# Patient Record
Sex: MALE | Race: BLACK OR AFRICAN AMERICAN | NOT HISPANIC OR LATINO | Employment: OTHER | URBAN - METROPOLITAN AREA
[De-identification: names, ages, dates, MRNs, and addresses within clinical notes are randomized per-mention and may not be internally consistent; named-entity substitution may affect disease eponyms.]

---

## 2017-02-07 ENCOUNTER — OFFICE VISIT (OUTPATIENT)
Dept: CARDIOLOGY | Facility: CLINIC | Age: 82
End: 2017-02-07

## 2017-02-07 VITALS
SYSTOLIC BLOOD PRESSURE: 164 MMHG | BODY MASS INDEX: 24.1 KG/M2 | DIASTOLIC BLOOD PRESSURE: 90 MMHG | HEIGHT: 68 IN | WEIGHT: 159 LBS | HEART RATE: 65 BPM

## 2017-02-07 DIAGNOSIS — I25.10 CORONARY ARTERY DISEASE INVOLVING NATIVE CORONARY ARTERY OF NATIVE HEART WITHOUT ANGINA PECTORIS: Primary | ICD-10-CM

## 2017-02-07 DIAGNOSIS — R09.89 RIGHT CAROTID BRUIT: ICD-10-CM

## 2017-02-07 DIAGNOSIS — E11.59 TYPE 2 DIABETES MELLITUS WITH OTHER CIRCULATORY COMPLICATION: ICD-10-CM

## 2017-02-07 DIAGNOSIS — E78.5 DYSLIPIDEMIA: ICD-10-CM

## 2017-02-07 DIAGNOSIS — I10 ESSENTIAL HYPERTENSION: ICD-10-CM

## 2017-02-07 PROCEDURE — 99214 OFFICE O/P EST MOD 30 MIN: CPT | Performed by: NURSE PRACTITIONER

## 2017-02-07 PROCEDURE — 93000 ELECTROCARDIOGRAM COMPLETE: CPT | Performed by: NURSE PRACTITIONER

## 2017-02-07 RX ORDER — METOPROLOL SUCCINATE 50 MG/1
50 TABLET, EXTENDED RELEASE ORAL DAILY
COMMUNITY
End: 2017-04-27 | Stop reason: SDUPTHER

## 2017-02-07 NOTE — PROGRESS NOTES
Date of Office Visit: 2017  Encounter Provider: THOMAS Hernandez  Place of Service: [facility] CARDIOLOGY  Patient Name: Gera Lira  :1935    Chief Complaint   Patient presents with   • Coronary Artery Disease     yearly follow up   :     HPI: Gera Lira is a 81 y.o. male comes in today for six month followup.  He is a patient of Dr. Vidales, and I am seeing him for the first time today.   He has a history of coronary artery disease, hypertension, diabetes, dyslipidemia (intolerant to statins) and status post coronary artery bypass graft.   He originally presented with an acute inferior myocardial infarction.  He underwent a cardiac catheterization and was found to have severe 3-vessel disease, including critical left main coronary disease.   He had a coronary artery bypass graft with Dr. Hill on 2014.      Today, he presents and has been feeling well.  He denies palpitations or tachycardia, shortness of breath, edema, lightheadedness, chest pain, fatigue, orthopnea or PND.  He is hypertensive today in clinic and he has not taken his medications.   He follows with a renal physician for mild renal insufficiency.  He is not on an ace inhibitor or an ARB, but is on amlodipine and this has recently been adjusted.  He follows up with Dr. Burrows on a routine basis.  He had lab work drawn in 10/2016 showing urine microalbumin of 24.9, total cholesterol of 240,  triglycerides 102, HDL 72, .   He does take Zetia.  His hemoglobin A1C was 6.81 on Trajenta.   His  CMP showed BUN of 13, creatinine 1.48, sodium 143, potassium 4.3,. chloride 102, CO2 27.0, ALT 30, AST 20.          Past Medical History   Diagnosis Date   • Acute inferior myocardial infarction    • Angina, class III    • Cardiomyopathy    • Coronary artery disease    • Diabetes mellitus    • Dyslipidemia    • Essential hypertension    • Health care maintenance    • History of penile cancer    •  "Hyperinsulinism    • Hyperlipidemia    • MI (myocardial infarction)    • Prostate cancer    • S/P CABG (coronary artery bypass graft)        Past Surgical History   Procedure Laterality Date   • Back surgery     • Coronary artery bypass graft     • Other surgical history       CARDIAC CATH PROCEDURE SUMMARY   • Other surgical history       RADIATION THERAPY   • Cardiac catheterization             Review of Systems   Constitution: Negative for fever and malaise/fatigue.   HENT: Positive for hearing loss. Negative for ear pain, nosebleeds and sore throat.    Eyes: Negative for double vision, pain, vision loss in left eye, vision loss in right eye and visual disturbance.   Cardiovascular: Negative for claudication and leg swelling.   Respiratory: Negative for cough, snoring and wheezing.    Endocrine: Negative for cold intolerance, heat intolerance and polyuria.   Skin: Negative for color change, itching and rash.   Musculoskeletal: Negative for joint pain, joint swelling and muscle cramps.   Gastrointestinal: Negative for abdominal pain, diarrhea, melena, nausea and vomiting.   Genitourinary: Negative for bladder incontinence and hematuria.   Neurological: Negative for excessive daytime sleepiness, dizziness, light-headedness, paresthesias and seizures.   Psychiatric/Behavioral: Negative for depression. The patient is not nervous/anxious.    All other systems reviewed and are negative.    All other systems reviewed and are negative    No Known Allergies    All aspects of family and social history reviewed.          Objective:     Vitals:    02/07/17 1038   BP: 164/90   BP Location: Left arm   Pulse: 65   Weight: 159 lb (72.1 kg)   Height: 68\" (172.7 cm)     Body mass index is 24.18 kg/(m^2).    PHYSICAL EXAM:  Physical Exam   Constitutional: He is oriented to person, place, and time. He appears well-developed and well-nourished.   HENT:   Head: Normocephalic and atraumatic.   Neck: Neck supple. No JVD present. "   Cardiovascular: Normal rate, regular rhythm, normal heart sounds and intact distal pulses.    Pulses:       Carotid pulses are 2+ on the right side with bruit, and 2+ on the left side.       Radial pulses are 2+ on the right side, and 2+ on the left side.        Dorsalis pedis pulses are 2+ on the right side, and 2+ on the left side.   Pulmonary/Chest: Effort normal and breath sounds normal. No accessory muscle usage. No respiratory distress. He has no rales.   Abdominal: Soft. Normal appearance and bowel sounds are normal. There is no tenderness.   Musculoskeletal: Normal range of motion. He exhibits no edema.   Neurological: He is alert and oriented to person, place, and time.   Skin: Skin is warm, dry and intact. He is not diaphoretic.   Psychiatric: He has a normal mood and affect. His speech is normal and behavior is normal. Judgment and thought content normal. Cognition and memory are normal.         ECG 12 Lead  Date/Time: 2/7/2017 11:06 AM  Performed by: DIANA BLANTON  Authorized by: DIANA BLANTON   Comparison: compared with previous ECG from 2/3/2016  Similar to previous ECG  Rhythm: sinus rhythm  Rate: normal  BPM: 65  T flattening: I, II and aVF  QRS axis: normal  Other findings: LVH  Clinical impression: abnormal ECG  Comments: Indication: CAD                Assessment:       Diagnosis Plan   1. Coronary artery disease involving native coronary artery of native heart without angina pectoris  Duplex Carotid Ultrasound CAR   2. Right carotid bruit  Duplex Carotid Ultrasound CAR   3. Essential hypertension  Duplex Carotid Ultrasound CAR   4. Type 2 diabetes mellitus with other circulatory complication  Duplex Carotid Ultrasound CAR   5. Dyslipidemia  Duplex Carotid Ultrasound CAR        Orders Placed This Encounter   Procedures   • ECG 12 Lead     This order was created via procedure documentation       Current Outpatient Prescriptions   Medication Sig Dispense Refill   • amLODIPine (NORVASC) 10  MG tablet Take 1 tablet by mouth Daily. 90 tablet 1   • aspirin 81 MG tablet Take 1 tablet by mouth daily.     • Coenzyme Q10 (CO Q 10) 100 MG capsule Take  by mouth. Take by mouth once daily     • linagliptin (TRADJENTA) 5 MG tablet tablet Take 1 tablet by mouth Daily. 90 tablet 3   • metoprolol succinate XL (TOPROL-XL) 50 MG 24 hr tablet Take 50 mg by mouth Daily.     • Omega-3 Fatty Acids (FISH OIL) 1200 MG capsule capsule Take by mouth.     • tadalafil (CIALIS) 20 MG tablet Take 1 tablet by mouth Daily As Needed for erectile dysfunction. 9 tablet 5   • ZETIA 10 MG tablet TAKE 1 TABLET BY MOUTH EVERY DAY 30 tablet 0     No current facility-administered medications for this visit.             Plan:       1. Coronary Artery Disease  Assessment  • The patient has no angina    Plan  • Lifestyle modifications discussed include adhering to a heart healthy diet, medication compliance, regular exercise and regular monitoring of cholesterol and blood pressure    Subjective - Objective  • There is a history of previous coronary artery bypass graft on or around 12/22/2014  • Current antiplatelet therapy includes aspirin 81 mg      2. Right carotid bruit-on exam, patient has a right carotid bruit.  He does not recall ever having a carotid ultrasound are not in the past few years.  I do not see this recorded on previous physical exams.  He definitely has significant risk factors with diabetes, coronary artery disease, hypertension and hyperlipidemia.  I will order carotid duplex ultrasounds to evaluate this right carotid bruit. No signs or symptoms of stroke.  Remains on aspirin 81 mg daily.    3.hypertension-hypertensive today in clinic but as not taken his blood pressure medicine this morning.  Medications recently adjusted by renal physician.    4.  Type 2 diabetes-managed by PCP    5.  Dyslipidemia-lab work drawn by PCP.  Intolerant to statins.  On Zetia.      Follow up in office in one year.  I will follow-up after  carotid ultrasounds.    As always, it has been a pleasure to participate in this patient's care.      Sincerely,      THOMAS Hernandez

## 2017-02-09 ENCOUNTER — HOSPITAL ENCOUNTER (OUTPATIENT)
Dept: CARDIOLOGY | Facility: HOSPITAL | Age: 82
Discharge: HOME | End: 2017-02-09
Admitting: NURSE PRACTITIONER

## 2017-02-09 ENCOUNTER — TELEPHONE (OUTPATIENT)
Dept: CARDIOLOGY | Facility: CLINIC | Age: 82
End: 2017-02-09

## 2017-02-09 DIAGNOSIS — I10 ESSENTIAL HYPERTENSION: ICD-10-CM

## 2017-02-09 DIAGNOSIS — R09.89 RIGHT CAROTID BRUIT: ICD-10-CM

## 2017-02-09 DIAGNOSIS — E11.59 TYPE 2 DIABETES MELLITUS WITH OTHER CIRCULATORY COMPLICATION: ICD-10-CM

## 2017-02-09 DIAGNOSIS — I25.10 CORONARY ARTERY DISEASE INVOLVING NATIVE CORONARY ARTERY OF NATIVE HEART WITHOUT ANGINA PECTORIS: ICD-10-CM

## 2017-02-09 DIAGNOSIS — E78.5 DYSLIPIDEMIA: ICD-10-CM

## 2017-02-09 LAB
BH CV XLRA MEAS LEFT DIST CCA EDV: -26.4 CM/SEC
BH CV XLRA MEAS LEFT DIST CCA PSV: -89.1 CM/SEC
BH CV XLRA MEAS LEFT DIST ICA EDV: 22.9 CM/SEC
BH CV XLRA MEAS LEFT DIST ICA PSV: 108 CM/SEC
BH CV XLRA MEAS LEFT MID CCA EDV: 24.6 CM/SEC
BH CV XLRA MEAS LEFT MID CCA PSV: 79.2 CM/SEC
BH CV XLRA MEAS LEFT MID ICA EDV: -24.6 CM/SEC
BH CV XLRA MEAS LEFT MID ICA PSV: -67.4 CM/SEC
BH CV XLRA MEAS LEFT PROX CCA EDV: 15.8 CM/SEC
BH CV XLRA MEAS LEFT PROX CCA PSV: 56.9 CM/SEC
BH CV XLRA MEAS LEFT PROX ECA EDV: -10 CM/SEC
BH CV XLRA MEAS LEFT PROX ECA PSV: -81.5 CM/SEC
BH CV XLRA MEAS LEFT PROX ICA EDV: -21.1 CM/SEC
BH CV XLRA MEAS LEFT PROX ICA PSV: -65.1 CM/SEC
BH CV XLRA MEAS LEFT PROX SCLA PSV: 111 CM/SEC
BH CV XLRA MEAS LEFT VERTEBRAL A EDV: 27 CM/SEC
BH CV XLRA MEAS LEFT VERTEBRAL A PSV: 78.6 CM/SEC
BH CV XLRA MEAS RIGHT DIST CCA EDV: -10.9 CM/SEC
BH CV XLRA MEAS RIGHT DIST CCA PSV: -48.4 CM/SEC
BH CV XLRA MEAS RIGHT DIST ICA EDV: -12.1 CM/SEC
BH CV XLRA MEAS RIGHT DIST ICA PSV: -40.4 CM/SEC
BH CV XLRA MEAS RIGHT ICA/CCA RATIO: 15
BH CV XLRA MEAS RIGHT MID CCA EDV: 11.4 CM/SEC
BH CV XLRA MEAS RIGHT MID CCA PSV: 55 CM/SEC
BH CV XLRA MEAS RIGHT MID ICA EDV: 12.9 CM/SEC
BH CV XLRA MEAS RIGHT MID ICA PSV: 63.9 CM/SEC
BH CV XLRA MEAS RIGHT PROX CCA EDV: -3.2 CM/SEC
BH CV XLRA MEAS RIGHT PROX CCA PSV: -40.7 CM/SEC
BH CV XLRA MEAS RIGHT PROX ECA EDV: -15.2 CM/SEC
BH CV XLRA MEAS RIGHT PROX ECA PSV: -127 CM/SEC
BH CV XLRA MEAS RIGHT PROX ICA EDV: -292 CM/SEC
BH CV XLRA MEAS RIGHT PROX ICA PSV: -721 CM/SEC
BH CV XLRA MEAS RIGHT PROX SCLA PSV: 111 CM/SEC
BH CV XLRA MEAS RIGHT VERTEBRAL A EDV: 14.7 CM/SEC
BH CV XLRA MEAS RIGHT VERTEBRAL A PSV: 49.8 CM/SEC
LEFT ARM BP: NORMAL MMHG
RIGHT ARM BP: NORMAL MMHG

## 2017-02-09 PROCEDURE — 93880 EXTRACRANIAL BILAT STUDY: CPT

## 2017-02-09 NOTE — PROGRESS NOTES
Bilateral carotid doppler complete and preliminary report positive for severe rt carotid disease and mild on the lt. Given to THOMAS Hernandez,   Pt. Was asymptomatic and released .   The office will call and f/u with the pt.

## 2017-02-09 NOTE — TELEPHONE ENCOUNTER
Patient found to have severe stenosis of right carotid. Dr. Vidales out of time at time test results.  Discussed with Dr. Vidal. He will see patient to discuss treatment options. Asymptomatic at this time.

## 2017-02-20 ENCOUNTER — OFFICE VISIT (OUTPATIENT)
Dept: CARDIOLOGY | Facility: CLINIC | Age: 82
End: 2017-02-20

## 2017-02-20 VITALS
HEART RATE: 67 BPM | BODY MASS INDEX: 24.1 KG/M2 | WEIGHT: 159 LBS | SYSTOLIC BLOOD PRESSURE: 140 MMHG | HEIGHT: 68 IN | DIASTOLIC BLOOD PRESSURE: 86 MMHG

## 2017-02-20 DIAGNOSIS — R09.89 RIGHT CAROTID BRUIT: Primary | ICD-10-CM

## 2017-02-20 PROCEDURE — 99215 OFFICE O/P EST HI 40 MIN: CPT | Performed by: INTERNAL MEDICINE

## 2017-02-27 PROBLEM — I65.21 STENOSIS OF RIGHT CAROTID ARTERY: Status: ACTIVE | Noted: 2017-02-27

## 2017-02-27 NOTE — PROGRESS NOTES
Gera Lira  1935  Date of Office Visit: 02/20/2017  Encounter Provider: Roque Vidal MD  Place of Service: [facility] CARDIOLOGY      CHIEF COMPLAINT:   Right carotid artery  stenosis, asymptomatic.         HISTORY OF PRESENT ILLNESS:   [place] and Ms. Ara Zuñiga,       I had the pleasure of seeing the patient in consultation today secondary to his carotid artery disease.   As you well know, he is a pleasant gentleman with a medical history of coronary artery disease with prior CABG on 12/22/2014.  He also has a history of hypertension, hyperlipidemia, diabetes mellitus, dyslipidemia with statin intolerance in the past, who presents to me for evaluation of a right carotid artery bruit followed by ultrasound suggesting severe stenosis in the right internal carotid artery.       In regards to his carotid disease, he has no previous CVA or TIA.  The bruit was found on examination is more of obviously an incidental finding, and this is not symptomatic.  He from a risk modification standpoint,  is not smoking but is not on a statin.   His last lipid panel was checked late last year, and his LDL at that time was 150.      On review of his ultrasound of the carotid, he does have severely increased velocity that the proximal right internal carotid artery of about 721 cm per second, consistent with severe stenosis.       His left is indicative of just mild stenosis.        Review of Systems   Constitution: Negative for fever, weakness and malaise/fatigue.   HENT: Negative for nosebleeds and sore throat.    Eyes: Negative for blurred vision and double vision.   Cardiovascular: Negative for chest pain, claudication, palpitations and syncope.   Respiratory: Negative for cough, shortness of breath and snoring.    Endocrine: Negative for cold intolerance, heat intolerance and polydipsia.   Skin: Negative for itching, poor wound healing and rash.   Musculoskeletal:  "Negative for joint pain, joint swelling, muscle weakness and myalgias.   Gastrointestinal: Negative for abdominal pain, melena, nausea and vomiting.   Neurological: Negative for light-headedness, loss of balance, seizures and vertigo.   Psychiatric/Behavioral: Negative for altered mental status and depression.          Past Medical History   Diagnosis Date   • Acute inferior myocardial infarction    • Angina, class III    • Cardiomyopathy    • Coronary artery disease    • Diabetes mellitus    • Dyslipidemia    • Essential hypertension    • Health care maintenance    • History of penile cancer    • Hyperinsulinism    • Hyperlipidemia    • Hypertension    • MI (myocardial infarction)    • Prostate cancer    • S/P CABG (coronary artery bypass graft)        The following portions of the patient's history were reviewed and updated as appropriate: Social history , Family history and Surgical history     Current Outpatient Prescriptions on File Prior to Visit   Medication Sig Dispense Refill   • amLODIPine (NORVASC) 10 MG tablet Take 1 tablet by mouth Daily. 90 tablet 1   • aspirin 81 MG tablet Take 1 tablet by mouth daily.     • Coenzyme Q10 (CO Q 10) 100 MG capsule Take  by mouth. Take by mouth once daily     • linagliptin (TRADJENTA) 5 MG tablet tablet Take 1 tablet by mouth Daily. 90 tablet 3   • metoprolol succinate XL (TOPROL-XL) 50 MG 24 hr tablet Take 50 mg by mouth Daily.     • Omega-3 Fatty Acids (FISH OIL) 1200 MG capsule capsule Take by mouth.     • tadalafil (CIALIS) 20 MG tablet Take 1 tablet by mouth Daily As Needed for erectile dysfunction. 9 tablet 5   • ZETIA 10 MG tablet TAKE 1 TABLET BY MOUTH EVERY DAY 30 tablet 0     No current facility-administered medications on file prior to visit.        No Known Allergies    Vitals:    02/20/17 1118   BP: 140/86   Pulse: 67   Weight: 159 lb (72.1 kg)   Height: 68\" (172.7 cm)     Physical Exam   Constitutional: He is oriented to person, place, and time. He appears " well-developed and well-nourished.   HENT:   Head: Normocephalic and atraumatic.   Eyes: Conjunctivae and EOM are normal. No scleral icterus.   Neck: Normal range of motion. Neck supple. Normal carotid pulses, no hepatojugular reflux and no JVD present. Carotid bruit is not present. No tracheal deviation present. No thyromegaly present.   Cardiovascular: Normal rate and regular rhythm.  Exam reveals no gallop and no friction rub.    No murmur heard.  Pulses:       Carotid pulses are 2+ on the right side with bruit, and 2+ on the left side.       Radial pulses are 2+ on the right side, and 2+ on the left side.        Femoral pulses are 2+ on the right side, and 2+ on the left side.       Dorsalis pedis pulses are 2+ on the right side, and 2+ on the left side.        Posterior tibial pulses are 2+ on the right side, and 2+ on the left side.   Pulmonary/Chest: Breath sounds normal. No respiratory distress. He has no decreased breath sounds. He has no wheezes. He has no rhonchi. He has no rales. He exhibits no tenderness.   Abdominal: Soft. Bowel sounds are normal. He exhibits no distension. There is no tenderness. There is no rebound.   Musculoskeletal: He exhibits no edema or deformity.   Neurological: He is alert and oriented to person, place, and time. He has normal strength. No sensory deficit.   Skin: No rash noted. No erythema.   Sternotomy     Psychiatric: He has a normal mood and affect. His behavior is normal.           No components found for: CBC  No results found for: CMP  No components found for: LIPID  No results found for: BMP    Procedures    DISCUSSION/SUMMARY The patient is a very pleasant, 81-year-old gentleman with a medical history of coronary artery disease with prior coronary artery bypass grafting, preserved ejection fraction, hypertension, hyperlipidemia, statin intolerance, and diabetes mellitus who presents to me secondary to severe right internal carotid artery stenosis that is asymptomatic.       His only high-risk feature at this time, from a surgical intervention standpoint, would be his age based on our carotid trials.      However, he does have carotid artery stenosis that is asymptomatic.  His yearly stroke risk with that is really only about 0.5%.  At the age of 81, I would argue that the benefit in considering carotid endarterectomy in his case would be very low.  With asymptomatic carotid artery stenosis, I would not recommend carotid artery stenting either.      Obviously, if he was a much younger gentleman, it would change our approach; however, in asymptomatic stenosis, our benefit would be over a prolonged period of time and he is over 80 years of age.      I think that, at this time, my recommendations would be to continue medical management.  I have encouraged him to actually try Livalo, as the incidence of myalgias is markedly lower than other statins with that.  It would be optimal to have him on a statin considering his elevated LDL.  I will also recommend rechecking a carotid ultrasound in two to three years to evaluate his carotid artery disease on the contralateral side, as he will be very dependent on collateral circulation and patency of his left side as well.

## 2017-04-27 ENCOUNTER — OFFICE VISIT (OUTPATIENT)
Dept: FAMILY MEDICINE CLINIC | Facility: CLINIC | Age: 82
End: 2017-04-27

## 2017-04-27 VITALS
HEART RATE: 64 BPM | TEMPERATURE: 97.6 F | HEIGHT: 68 IN | BODY MASS INDEX: 23.73 KG/M2 | OXYGEN SATURATION: 98 % | WEIGHT: 156.6 LBS | SYSTOLIC BLOOD PRESSURE: 170 MMHG | DIASTOLIC BLOOD PRESSURE: 80 MMHG

## 2017-04-27 DIAGNOSIS — E78.2 MIXED HYPERLIPIDEMIA: ICD-10-CM

## 2017-04-27 DIAGNOSIS — N42.9 DISORDER OF PROSTATE: ICD-10-CM

## 2017-04-27 DIAGNOSIS — Z23 IMMUNIZATION DUE: ICD-10-CM

## 2017-04-27 DIAGNOSIS — I10 HYPERTENSION, ESSENTIAL: ICD-10-CM

## 2017-04-27 DIAGNOSIS — R35.0 URINE FREQUENCY: ICD-10-CM

## 2017-04-27 DIAGNOSIS — Z12.5 SCREENING PSA (PROSTATE SPECIFIC ANTIGEN): Primary | ICD-10-CM

## 2017-04-27 DIAGNOSIS — E11.9 DIABETES MELLITUS WITHOUT COMPLICATION (HCC): ICD-10-CM

## 2017-04-27 LAB
ALBUMIN SERPL-MCNC: 4.4 G/DL (ref 3.5–5.2)
ALBUMIN UR-MCNC: 50 MG/L (ref 0–20)
ALBUMIN/GLOB SERPL: 1.5 G/DL
ALP SERPL-CCNC: 52 U/L (ref 39–117)
ALT SERPL W P-5'-P-CCNC: 32 U/L (ref 1–41)
ANION GAP SERPL CALCULATED.3IONS-SCNC: 14 MMOL/L
AST SERPL-CCNC: 28 U/L (ref 1–40)
BILIRUB SERPL-MCNC: 0.4 MG/DL (ref 0.1–1.2)
BUN BLD-MCNC: 14 MG/DL (ref 8–23)
BUN/CREAT SERPL: 10.4 (ref 7–25)
CALCIUM SPEC-SCNC: 9.6 MG/DL (ref 8.6–10.5)
CHLORIDE SERPL-SCNC: 101 MMOL/L (ref 98–107)
CHOLEST SERPL-MCNC: 157 MG/DL (ref 0–200)
CO2 SERPL-SCNC: 28 MMOL/L (ref 22–29)
CREAT BLD-MCNC: 1.34 MG/DL (ref 0.76–1.27)
GFR SERPL CREATININE-BSD FRML MDRD: 62 ML/MIN/1.73
GLOBULIN UR ELPH-MCNC: 3 GM/DL
GLUCOSE BLD-MCNC: 161 MG/DL (ref 65–99)
HBA1C MFR BLD: 6.93 % (ref 4.8–5.6)
HDLC SERPL-MCNC: 80 MG/DL (ref 40–60)
LDLC SERPL CALC-MCNC: 64 MG/DL (ref 0–100)
LDLC/HDLC SERPL: 0.8 {RATIO}
POTASSIUM BLD-SCNC: 4.1 MMOL/L (ref 3.5–5.2)
PROT SERPL-MCNC: 7.4 G/DL (ref 6–8.5)
PSA SERPL-MCNC: 0.85 NG/ML (ref 0–4)
SODIUM BLD-SCNC: 143 MMOL/L (ref 136–145)
TRIGL SERPL-MCNC: 66 MG/DL (ref 0–150)
VLDLC SERPL-MCNC: 13.2 MG/DL (ref 5–40)

## 2017-04-27 PROCEDURE — 90670 PCV13 VACCINE IM: CPT | Performed by: INTERNAL MEDICINE

## 2017-04-27 PROCEDURE — 80053 COMPREHEN METABOLIC PANEL: CPT | Performed by: INTERNAL MEDICINE

## 2017-04-27 PROCEDURE — 84153 ASSAY OF PSA TOTAL: CPT | Performed by: INTERNAL MEDICINE

## 2017-04-27 PROCEDURE — G0009 ADMIN PNEUMOCOCCAL VACCINE: HCPCS | Performed by: INTERNAL MEDICINE

## 2017-04-27 PROCEDURE — 80061 LIPID PANEL: CPT | Performed by: INTERNAL MEDICINE

## 2017-04-27 PROCEDURE — 83036 HEMOGLOBIN GLYCOSYLATED A1C: CPT | Performed by: INTERNAL MEDICINE

## 2017-04-27 PROCEDURE — 82043 UR ALBUMIN QUANTITATIVE: CPT | Performed by: INTERNAL MEDICINE

## 2017-04-27 PROCEDURE — 99214 OFFICE O/P EST MOD 30 MIN: CPT | Performed by: INTERNAL MEDICINE

## 2017-04-27 RX ORDER — EZETIMIBE 10 MG/1
10 TABLET ORAL DAILY
Qty: 90 TABLET | Refills: 3 | Status: SHIPPED | OUTPATIENT
Start: 2017-04-27 | End: 2017-10-25 | Stop reason: SDUPTHER

## 2017-04-27 RX ORDER — METOPROLOL SUCCINATE 50 MG/1
50 TABLET, EXTENDED RELEASE ORAL DAILY
Qty: 90 TABLET | Refills: 3 | Status: SHIPPED | OUTPATIENT
Start: 2017-04-27 | End: 2018-05-14 | Stop reason: SDUPTHER

## 2017-04-27 RX ORDER — AMLODIPINE BESYLATE 10 MG/1
10 TABLET ORAL DAILY
Qty: 90 TABLET | Refills: 3 | Status: SHIPPED | OUTPATIENT
Start: 2017-04-27 | End: 2018-06-25 | Stop reason: SDUPTHER

## 2017-04-27 RX ORDER — TADALAFIL 20 MG/1
20 TABLET ORAL DAILY PRN
Qty: 9 TABLET | Refills: 5 | Status: SHIPPED | OUTPATIENT
Start: 2017-04-27 | End: 2020-03-13

## 2017-04-27 NOTE — PROGRESS NOTES
Subjective   Gera Lira is a 82 y.o. male.   Follow-up on glucose for diabetes lipids hypertension  History of Present Illness   Patient also needs PSA drawn today does have known prostate cancer followed by her [facility] apparently gets his PSA done there by his history George increased urine frequency also    Review of Systems   HENT: Positive for postnasal drip and sneezing.    All other systems reviewed and are negative.      Objective   Vitals:    04/27/17 1302   BP: 170/80   Pulse: 64   Temp: 97.6 °F (36.4 °C)   SpO2: 98%   Weight: 156 lb 9.6 oz (71 kg)     Physical Exam   Constitutional: He appears well-developed and well-nourished.   Eyes: Conjunctivae are normal.   Pupils are equal   Cardiovascular: Normal rate, regular rhythm and normal heart sounds.    Pulmonary/Chest: Effort normal and breath sounds normal.   Abdominal: Soft. Bowel sounds are normal.   Musculoskeletal:   Gait and station within normal limits   Neurological: He is alert.   Nursing note and vitals reviewed.      Lab Results   Component Value Date    INR 1.1 12/23/2014    INR 1.4 (H) 12/22/2014    INR 1.0 12/20/2014       Procedures    Assessment/Plan   1.  Type 2 diabetes plan call regarding lab work in 4 days' time continue present medicine for the adjustments made as needed.    2.  Hypertension today's values elevated I do want patient check blood pressure, call in readings approximately 1 week's time he does state that he tends run higher in doctor's offices.    3.  Hyperlipidemia plan await labs if good recheck in 6 months    4.  Urine frequency plan get UA    5.  Encounter for screening PSA patient does have personal history of prostate cancer is followed by [facility] we'll get updated lab values intent for to them.    6.  Immunization update for Prevnar 13 follow-up on as-needed basis with this.    Much of this encounter note is an electronic transcription/translation of spoken language to printed text.  The  electronic translation of spoken language may permit erroneous, or at times, nonsensical words or phrases to be inadvertently transcribed.  Although I have reviewed the note for such errors, some may still exist.

## 2017-10-20 DIAGNOSIS — E78.2 MIXED HYPERLIPIDEMIA: ICD-10-CM

## 2017-10-20 DIAGNOSIS — E11.9 DIABETES MELLITUS WITHOUT COMPLICATION (HCC): ICD-10-CM

## 2017-10-20 DIAGNOSIS — R35.0 URINE FREQUENCY: ICD-10-CM

## 2017-10-20 DIAGNOSIS — I10 HYPERTENSION, ESSENTIAL: ICD-10-CM

## 2017-10-20 RX ORDER — EZETIMIBE 10 MG/1
TABLET ORAL
Qty: 90 TABLET | Refills: 0 | Status: SHIPPED | OUTPATIENT
Start: 2017-10-20 | End: 2018-01-25 | Stop reason: SDUPTHER

## 2017-10-20 RX ORDER — EZETIMIBE 10 MG/1
TABLET ORAL
Qty: 90 TABLET | Refills: 0 | Status: SHIPPED | OUTPATIENT
Start: 2017-10-20 | End: 2017-10-25 | Stop reason: SDUPTHER

## 2017-10-25 DIAGNOSIS — R35.0 URINE FREQUENCY: ICD-10-CM

## 2017-10-25 DIAGNOSIS — E78.2 MIXED HYPERLIPIDEMIA: ICD-10-CM

## 2017-10-25 DIAGNOSIS — E11.9 DIABETES MELLITUS WITHOUT COMPLICATION (HCC): ICD-10-CM

## 2017-10-25 DIAGNOSIS — I10 HYPERTENSION, ESSENTIAL: ICD-10-CM

## 2017-12-06 ENCOUNTER — TELEPHONE (OUTPATIENT)
Dept: FAMILY MEDICINE CLINIC | Facility: CLINIC | Age: 82
End: 2017-12-06

## 2017-12-14 DIAGNOSIS — R35.0 URINE FREQUENCY: ICD-10-CM

## 2017-12-14 DIAGNOSIS — E78.2 MIXED HYPERLIPIDEMIA: ICD-10-CM

## 2017-12-14 DIAGNOSIS — I10 HYPERTENSION, ESSENTIAL: ICD-10-CM

## 2017-12-14 DIAGNOSIS — E11.9 DIABETES MELLITUS WITHOUT COMPLICATION (HCC): ICD-10-CM

## 2017-12-14 RX ORDER — TADALAFIL 20 MG
TABLET ORAL
Qty: 9 TABLET | Refills: 0 | Status: SHIPPED | OUTPATIENT
Start: 2017-12-14 | End: 2017-12-19 | Stop reason: SDUPTHER

## 2017-12-19 ENCOUNTER — OFFICE VISIT (OUTPATIENT)
Dept: FAMILY MEDICINE CLINIC | Facility: CLINIC | Age: 82
End: 2017-12-19

## 2017-12-19 VITALS
WEIGHT: 157 LBS | SYSTOLIC BLOOD PRESSURE: 136 MMHG | HEART RATE: 92 BPM | DIASTOLIC BLOOD PRESSURE: 68 MMHG | OXYGEN SATURATION: 98 % | HEIGHT: 68 IN | BODY MASS INDEX: 23.79 KG/M2 | TEMPERATURE: 97.9 F

## 2017-12-19 DIAGNOSIS — E78.49 OTHER HYPERLIPIDEMIA: ICD-10-CM

## 2017-12-19 DIAGNOSIS — E11.9 DIABETES MELLITUS WITHOUT COMPLICATION (HCC): ICD-10-CM

## 2017-12-19 DIAGNOSIS — I10 HYPERTENSION, ESSENTIAL: Primary | ICD-10-CM

## 2017-12-19 DIAGNOSIS — Z23 IMMUNIZATION DUE: ICD-10-CM

## 2017-12-19 LAB
ALBUMIN SERPL-MCNC: 4.4 G/DL (ref 3.5–5.2)
ALBUMIN UR-MCNC: 50 MG/L (ref 0–20)
ALBUMIN/GLOB SERPL: 1.4 G/DL
ALP SERPL-CCNC: 55 U/L (ref 39–117)
ALT SERPL W P-5'-P-CCNC: 36 U/L (ref 1–41)
ANION GAP SERPL CALCULATED.3IONS-SCNC: 11.2 MMOL/L
AST SERPL-CCNC: 26 U/L (ref 1–40)
BILIRUB SERPL-MCNC: 0.3 MG/DL (ref 0.1–1.2)
BUN BLD-MCNC: 15 MG/DL (ref 8–23)
BUN/CREAT SERPL: 10.1 (ref 7–25)
CALCIUM SPEC-SCNC: 9.3 MG/DL (ref 8.6–10.5)
CHLORIDE SERPL-SCNC: 104 MMOL/L (ref 98–107)
CHOLEST SERPL-MCNC: 168 MG/DL (ref 0–200)
CO2 SERPL-SCNC: 30.8 MMOL/L (ref 22–29)
CREAT BLD-MCNC: 1.48 MG/DL (ref 0.76–1.27)
GFR SERPL CREATININE-BSD FRML MDRD: 55 ML/MIN/1.73
GLOBULIN UR ELPH-MCNC: 3.1 GM/DL
GLUCOSE BLD-MCNC: 202 MG/DL (ref 65–99)
HBA1C MFR BLD: 7.32 % (ref 4.8–5.6)
HDLC SERPL-MCNC: 71 MG/DL (ref 40–60)
LDLC SERPL CALC-MCNC: 65 MG/DL (ref 0–100)
LDLC/HDLC SERPL: 0.92 {RATIO}
POTASSIUM BLD-SCNC: 4 MMOL/L (ref 3.5–5.2)
PROT SERPL-MCNC: 7.5 G/DL (ref 6–8.5)
SODIUM BLD-SCNC: 146 MMOL/L (ref 136–145)
TRIGL SERPL-MCNC: 160 MG/DL (ref 0–150)
VLDLC SERPL-MCNC: 32 MG/DL (ref 5–40)

## 2017-12-19 PROCEDURE — 99214 OFFICE O/P EST MOD 30 MIN: CPT | Performed by: INTERNAL MEDICINE

## 2017-12-19 PROCEDURE — 83036 HEMOGLOBIN GLYCOSYLATED A1C: CPT | Performed by: INTERNAL MEDICINE

## 2017-12-19 PROCEDURE — 80061 LIPID PANEL: CPT | Performed by: INTERNAL MEDICINE

## 2017-12-19 PROCEDURE — 80053 COMPREHEN METABOLIC PANEL: CPT | Performed by: INTERNAL MEDICINE

## 2017-12-19 PROCEDURE — 82043 UR ALBUMIN QUANTITATIVE: CPT | Performed by: INTERNAL MEDICINE

## 2017-12-19 PROCEDURE — 36415 COLL VENOUS BLD VENIPUNCTURE: CPT | Performed by: INTERNAL MEDICINE

## 2017-12-19 PROCEDURE — G0008 ADMIN INFLUENZA VIRUS VAC: HCPCS | Performed by: INTERNAL MEDICINE

## 2017-12-19 PROCEDURE — 90662 IIV NO PRSV INCREASED AG IM: CPT | Performed by: INTERNAL MEDICINE

## 2017-12-19 NOTE — PROGRESS NOTES
Subjective   Gera Lira is a 82 y.o. male.   Glu bp dm glu 140, wants ears checked as he has recurrent serum impaction does wear hearing aid  History of Present Illness   Patient does: Mild pressure sensation 0 stress keep his ears clean out but does nonetheless of recurrent serum impaction in part due to wearing hearing aids glucose fasting is been around 140.  Per his description his prostate cancer and ongoing follow-up doing well.  Blood pressure needs rechecking as does lipids.  Compliant with medications without complaints    Review of Systems   Constitutional: Negative.    All other systems reviewed and are negative.      Objective   Vitals:    12/19/17 1549   BP: 136/68   Pulse: 92   Temp: 97.9 °F (36.6 °C)   SpO2: 98%   Weight: 71.2 kg (157 lb)     Physical Exam   Constitutional: He appears well-developed and well-nourished.   HENT:   Head: Normocephalic and atraumatic.   Right Ear: External ear normal.   Left Ear: External ear normal.   Minimal cerumen the distal external canal only no impaction present no problems with hearing or is appearing A with current essentially normal cerumen level in ear canals.   Eyes: Conjunctivae are normal. Pupils are equal, round, and reactive to light.   Cardiovascular: Normal rate, regular rhythm and normal heart sounds.    Pulmonary/Chest: Effort normal and breath sounds normal.   Abdominal: Soft. Bowel sounds are normal.   Neurological: He is alert.   Stable gait and station does not appear Prone to Falls   Skin: Skin is warm and dry.       Lab Results   Component Value Date    INR 1.1 12/23/2014    INR 1.4 (H) 12/22/2014    INR 1.0 12/20/2014       Procedures    Assessment/Plan     1.  Hypertension controlled continue present medicine follow-up 6 months    2.  Hyperlipidemia plan await lab if satisfactory recheck 6 months    3 type 2 diabetes controlled by history plan await lab if hemoglobin A1c good recheck in 6 months    4.  Immunization update with flu vaccine  given    Much of this encounter note is an electronic transcription/translation of spoken language to printed text.  The electronic translation of spoken language may permit erroneous, or at times, nonsensical words or phrases to be inadvertently transcribed.  Although I have reviewed the note for such errors, some may still exist.

## 2017-12-27 DIAGNOSIS — N28.9 ABNORMAL KIDNEY FUNCTION: Primary | ICD-10-CM

## 2017-12-27 RX ORDER — GLIMEPIRIDE 1 MG/1
1 TABLET ORAL
Qty: 30 TABLET | Refills: 2 | Status: SHIPPED | OUTPATIENT
Start: 2017-12-27 | End: 2018-05-18 | Stop reason: SDUPTHER

## 2018-01-04 ENCOUNTER — HOSPITAL ENCOUNTER (EMERGENCY)
Facility: HOSPITAL | Age: 83
Discharge: HOME | End: 2018-01-04
Attending: EMERGENCY MEDICINE | Admitting: EMERGENCY MEDICINE

## 2018-01-04 ENCOUNTER — APPOINTMENT (OUTPATIENT)
Dept: CT IMAGING | Facility: HOSPITAL | Age: 83
End: 2018-01-04
Attending: EMERGENCY MEDICINE

## 2018-01-04 VITALS
HEART RATE: 68 BPM | HEIGHT: 68 IN | RESPIRATION RATE: 16 BRPM | BODY MASS INDEX: 23.95 KG/M2 | DIASTOLIC BLOOD PRESSURE: 91 MMHG | OXYGEN SATURATION: 96 % | TEMPERATURE: 97.3 F | SYSTOLIC BLOOD PRESSURE: 184 MMHG | WEIGHT: 158 LBS

## 2018-01-04 DIAGNOSIS — K21.00 GASTROESOPHAGEAL REFLUX DISEASE WITH ESOPHAGITIS: ICD-10-CM

## 2018-01-04 DIAGNOSIS — A08.4 VIRAL GASTROENTERITIS: Primary | ICD-10-CM

## 2018-01-04 LAB
ALBUMIN SERPL-MCNC: 4 G/DL (ref 3.5–5.2)
ALBUMIN/GLOB SERPL: 1.2 G/DL
ALP SERPL-CCNC: 50 U/L (ref 39–117)
ALT SERPL W P-5'-P-CCNC: 27 U/L (ref 1–41)
ANION GAP SERPL CALCULATED.3IONS-SCNC: 10.7 MMOL/L
AST SERPL-CCNC: 23 U/L (ref 1–40)
BACTERIA UR QL AUTO: NORMAL /HPF
BASOPHILS # BLD AUTO: 0.01 10*3/MM3 (ref 0–0.2)
BASOPHILS NFR BLD AUTO: 0.1 % (ref 0–1.5)
BILIRUB SERPL-MCNC: 0.2 MG/DL (ref 0.1–1.2)
BILIRUB UR QL STRIP: NEGATIVE
BUN BLD-MCNC: 15 MG/DL (ref 8–23)
BUN/CREAT SERPL: 10.2 (ref 7–25)
CALCIUM SPEC-SCNC: 8.8 MG/DL (ref 8.6–10.5)
CHLORIDE SERPL-SCNC: 103 MMOL/L (ref 98–107)
CLARITY UR: CLEAR
CO2 SERPL-SCNC: 26.3 MMOL/L (ref 22–29)
COLOR UR: YELLOW
CREAT BLD-MCNC: 1.47 MG/DL (ref 0.76–1.27)
DEPRECATED RDW RBC AUTO: 39.4 FL (ref 37–54)
EOSINOPHIL # BLD AUTO: 0.14 10*3/MM3 (ref 0–0.7)
EOSINOPHIL NFR BLD AUTO: 1.6 % (ref 0.3–6.2)
ERYTHROCYTE [DISTWIDTH] IN BLOOD BY AUTOMATED COUNT: 13.2 % (ref 11.5–14.5)
GFR SERPL CREATININE-BSD FRML MDRD: 56 ML/MIN/1.73
GLOBULIN UR ELPH-MCNC: 3.3 GM/DL
GLUCOSE BLD-MCNC: 173 MG/DL (ref 65–99)
GLUCOSE UR STRIP-MCNC: NEGATIVE MG/DL
HCT VFR BLD AUTO: 39.4 % (ref 40.4–52.2)
HGB BLD-MCNC: 11.8 G/DL (ref 13.7–17.6)
HGB UR QL STRIP.AUTO: NEGATIVE
HOLD SPECIMEN: NORMAL
HOLD SPECIMEN: NORMAL
HYALINE CASTS UR QL AUTO: NORMAL /LPF
IMM GRANULOCYTES # BLD: 0.02 10*3/MM3 (ref 0–0.03)
IMM GRANULOCYTES NFR BLD: 0.2 % (ref 0–0.5)
KETONES UR QL STRIP: NEGATIVE
LEUKOCYTE ESTERASE UR QL STRIP.AUTO: NEGATIVE
LIPASE SERPL-CCNC: 37 U/L (ref 13–60)
LYMPHOCYTES # BLD AUTO: 0.81 10*3/MM3 (ref 0.9–4.8)
LYMPHOCYTES NFR BLD AUTO: 9.2 % (ref 19.6–45.3)
MCH RBC QN AUTO: 24.3 PG (ref 27–32.7)
MCHC RBC AUTO-ENTMCNC: 29.9 G/DL (ref 32.6–36.4)
MCV RBC AUTO: 81.2 FL (ref 79.8–96.2)
MONOCYTES # BLD AUTO: 0.96 10*3/MM3 (ref 0.2–1.2)
MONOCYTES NFR BLD AUTO: 10.9 % (ref 5–12)
NEUTROPHILS # BLD AUTO: 6.86 10*3/MM3 (ref 1.9–8.1)
NEUTROPHILS NFR BLD AUTO: 78 % (ref 42.7–76)
NITRITE UR QL STRIP: NEGATIVE
PH UR STRIP.AUTO: <=5 [PH] (ref 5–8)
PLATELET # BLD AUTO: 185 10*3/MM3 (ref 140–500)
PMV BLD AUTO: 10.3 FL (ref 6–12)
POTASSIUM BLD-SCNC: 4 MMOL/L (ref 3.5–5.2)
PROT SERPL-MCNC: 7.3 G/DL (ref 6–8.5)
PROT UR QL STRIP: ABNORMAL
RBC # BLD AUTO: 4.85 10*6/MM3 (ref 4.6–6)
RBC # UR: NORMAL /HPF
REF LAB TEST METHOD: NORMAL
SODIUM BLD-SCNC: 140 MMOL/L (ref 136–145)
SP GR UR STRIP: 1.02 (ref 1–1.03)
SQUAMOUS #/AREA URNS HPF: NORMAL /HPF
UROBILINOGEN UR QL STRIP: ABNORMAL
WBC NRBC COR # BLD: 8.8 10*3/MM3 (ref 4.5–10.7)
WBC UR QL AUTO: NORMAL /HPF
WHOLE BLOOD HOLD SPECIMEN: NORMAL
WHOLE BLOOD HOLD SPECIMEN: NORMAL

## 2018-01-04 PROCEDURE — 25010000002 ONDANSETRON PER 1 MG: Performed by: EMERGENCY MEDICINE

## 2018-01-04 PROCEDURE — 81001 URINALYSIS AUTO W/SCOPE: CPT | Performed by: EMERGENCY MEDICINE

## 2018-01-04 PROCEDURE — 85025 COMPLETE CBC W/AUTO DIFF WBC: CPT | Performed by: EMERGENCY MEDICINE

## 2018-01-04 PROCEDURE — 99283 EMERGENCY DEPT VISIT LOW MDM: CPT

## 2018-01-04 PROCEDURE — 96361 HYDRATE IV INFUSION ADD-ON: CPT

## 2018-01-04 PROCEDURE — 80053 COMPREHEN METABOLIC PANEL: CPT | Performed by: EMERGENCY MEDICINE

## 2018-01-04 PROCEDURE — 0 IOPAMIDOL 61 % SOLUTION: Performed by: EMERGENCY MEDICINE

## 2018-01-04 PROCEDURE — 36415 COLL VENOUS BLD VENIPUNCTURE: CPT | Performed by: EMERGENCY MEDICINE

## 2018-01-04 PROCEDURE — 74177 CT ABD & PELVIS W/CONTRAST: CPT

## 2018-01-04 PROCEDURE — 83690 ASSAY OF LIPASE: CPT | Performed by: EMERGENCY MEDICINE

## 2018-01-04 PROCEDURE — 96374 THER/PROPH/DIAG INJ IV PUSH: CPT

## 2018-01-04 RX ORDER — ONDANSETRON 4 MG/1
4 TABLET, FILM COATED ORAL EVERY 6 HOURS PRN
Qty: 20 TABLET | Refills: 0 | Status: SHIPPED | OUTPATIENT
Start: 2018-01-04 | End: 2018-02-07

## 2018-01-04 RX ORDER — SODIUM CHLORIDE 0.9 % (FLUSH) 0.9 %
10 SYRINGE (ML) INJECTION AS NEEDED
Status: DISCONTINUED | OUTPATIENT
Start: 2018-01-04 | End: 2018-01-04 | Stop reason: HOSPADM

## 2018-01-04 RX ORDER — PANTOPRAZOLE SODIUM 20 MG/1
20 TABLET, DELAYED RELEASE ORAL DAILY
Qty: 60 TABLET | Refills: 0 | Status: SHIPPED | OUTPATIENT
Start: 2018-01-04 | End: 2018-02-07

## 2018-01-04 RX ORDER — ONDANSETRON 2 MG/ML
4 INJECTION INTRAMUSCULAR; INTRAVENOUS ONCE
Status: COMPLETED | OUTPATIENT
Start: 2018-01-04 | End: 2018-01-04

## 2018-01-04 RX ADMIN — SODIUM CHLORIDE 1000 ML: 9 INJECTION, SOLUTION INTRAVENOUS at 12:50

## 2018-01-04 RX ADMIN — IOPAMIDOL 85 ML: 612 INJECTION, SOLUTION INTRAVENOUS at 13:06

## 2018-01-04 RX ADMIN — ONDANSETRON 4 MG: 2 INJECTION INTRAMUSCULAR; INTRAVENOUS at 12:51

## 2018-01-04 NOTE — ED PROVIDER NOTES
EMERGENCY DEPARTMENT ENCOUNTER    CHIEF COMPLAINT  Chief Complaint: vomiting  History given by: patient, spouse  History limited by: nothing  Room Number: 04/04  PMD: Luis Burrows MD      HPI:  Pt is a 82 y.o. male who presents complaining of N/V/D x3 days. Pt states he has only been able to drink a small amount of fluids but has vomited any food he has tried to eat over the past 3 days. Pt denies blood in his stool or emesis as well as fever.     Duration:  3 days  Onset: gradual  Timing: constant  Intensity/Severity: moderate  Progression: unchanged  Associated Symptoms: nausea, diarrhea  Previous Episodes: none    PAST MEDICAL HISTORY  Active Ambulatory Problems     Diagnosis Date Noted   • Right carotid bruit 02/07/2017   • Coronary artery disease    • Diabetes mellitus    • Dyslipidemia    • Essential hypertension    • Stenosis of right carotid artery 02/27/2017     Resolved Ambulatory Problems     Diagnosis Date Noted   • No Resolved Ambulatory Problems     Past Medical History:   Diagnosis Date   • Acute inferior myocardial infarction    • Angina, class III    • Cardiomyopathy    • Coronary artery disease    • Diabetes mellitus    • Dyslipidemia    • Essential hypertension    • Health care maintenance    • History of penile cancer    • Hyperinsulinism    • Hyperlipidemia    • Hypertension    • MI (myocardial infarction)    • Prostate cancer    • S/P CABG (coronary artery bypass graft)        PAST SURGICAL HISTORY  Past Surgical History:   Procedure Laterality Date   • BACK SURGERY     • CARDIAC CATHETERIZATION     • CORONARY ARTERY BYPASS GRAFT     • OTHER SURGICAL HISTORY      CARDIAC CATH PROCEDURE SUMMARY   • OTHER SURGICAL HISTORY      RADIATION THERAPY       FAMILY HISTORY  Family History   Problem Relation Age of Onset   • Coronary artery disease Father    • Hypertension Father    • Breast cancer Sister    • Cerebral aneurysm Sister    • Diabetes Sister    • No Known Problems Mother        SOCIAL  HISTORY  Social History     Social History   • Marital status:      Spouse name: N/A   • Number of children: N/A   • Years of education: N/A     Occupational History   • Not on file.     Social History Main Topics   • Smoking status: Former Smoker   • Smokeless tobacco: Not on file   • Alcohol use No   • Drug use: No   • Sexual activity: Defer     Other Topics Concern   • Not on file     Social History Narrative       ALLERGIES  Review of patient's allergies indicates no known allergies.    REVIEW OF SYSTEMS  Review of Systems   Constitutional: Negative.  Negative for chills and fever.   HENT: Negative.  Negative for sore throat.    Eyes: Negative.    Respiratory: Negative.  Negative for cough.    Cardiovascular: Negative.  Negative for chest pain.   Gastrointestinal: Positive for diarrhea, nausea and vomiting.   Genitourinary: Negative.  Negative for dysuria.   Musculoskeletal: Negative.  Negative for back pain.   Skin: Negative.  Negative for rash.   Neurological: Negative.  Negative for headaches.       PHYSICAL EXAM  ED Triage Vitals   Temp Heart Rate Resp BP SpO2   01/04/18 0944 01/04/18 0944 01/04/18 0944 01/04/18 0944 01/04/18 0944   97.3 °F (36.3 °C) 99 18 156/91 97 %      Temp src Heart Rate Source Patient Position BP Location FiO2 (%)   -- -- -- -- --              Physical Exam   Constitutional: He is oriented to person, place, and time and well-developed, well-nourished, and in no distress.   HENT:   Head: Normocephalic and atraumatic.   Mouth/Throat: Mucous membranes are dry.   Eyes: EOM are normal. Pupils are equal, round, and reactive to light.   Neck: Normal range of motion. Neck supple.   Cardiovascular: Normal rate, regular rhythm and normal heart sounds.    Pulmonary/Chest: Effort normal and breath sounds normal. No respiratory distress.   Abdominal: Soft. There is no tenderness. There is no rebound and no guarding.   Diminished bowel sounds   Musculoskeletal: Normal range of motion. He  exhibits no edema.   Neurological: He is alert and oriented to person, place, and time. He has normal sensation and normal strength.   Skin: Skin is warm and dry.   Psychiatric: Mood and affect normal.   Nursing note and vitals reviewed.      LAB RESULTS  Lab Results (last 24 hours)     Procedure Component Value Units Date/Time    CBC & Differential [[id number]] Collected:  01/04/18 1008    Specimen:  Blood Updated:  01/04/18 1026    Narrative:       The following orders were created for panel order CBC & Differential.  Procedure                               Abnormality         Status                     ---------                               -----------         ------                     CBC Auto Differential[[id number]]        Abnormal            Final result                 Please view results for these tests on the individual orders.    Comprehensive Metabolic Panel [[id number]]  (Abnormal) Collected:  01/04/18 1008    Specimen:  Blood Updated:  01/04/18 1040     Glucose 173 (H) mg/dL      BUN 15 mg/dL      Creatinine 1.47 (H) mg/dL      Sodium 140 mmol/L      Potassium 4.0 mmol/L      Chloride 103 mmol/L      CO2 26.3 mmol/L      Calcium 8.8 mg/dL      Total Protein 7.3 g/dL      Albumin 4.00 g/dL      ALT (SGPT) 27 U/L      AST (SGOT) 23 U/L      Alkaline Phosphatase 50 U/L      Total Bilirubin 0.2 mg/dL      eGFR  African Amer 56 (L) mL/min/1.73      Globulin 3.3 gm/dL      A/G Ratio 1.2 g/dL      BUN/Creatinine Ratio 10.2     Anion Gap 10.7 mmol/L     Narrative:       The MDRD GFR formula is only valid for adults with stable renal function between ages 18 and 70.    Lipase [[id number]]  (Normal) Collected:  01/04/18 1008    Specimen:  Blood Updated:  01/04/18 1040     Lipase 37 U/L     CBC Auto Differential [[id number]]  (Abnormal) Collected:  01/04/18 1008    Specimen:  Blood Updated:  01/04/18 1026     WBC 8.80 10*3/mm3      RBC 4.85 10*6/mm3      Hemoglobin 11.8 (L) g/dL      Hematocrit 39.4 (L) %      MCV  81.2 fL      MCH 24.3 (L) pg      MCHC 29.9 (L) g/dL      RDW 13.2 %      RDW-SD 39.4 fl      MPV 10.3 fL      Platelets 185 10*3/mm3      Neutrophil % 78.0 (H) %      Lymphocyte % 9.2 (L) %      Monocyte % 10.9 %      Eosinophil % 1.6 %      Basophil % 0.1 %      Immature Grans % 0.2 %      Neutrophils, Absolute 6.86 10*3/mm3      Lymphocytes, Absolute 0.81 (L) 10*3/mm3      Monocytes, Absolute 0.96 10*3/mm3      Eosinophils, Absolute 0.14 10*3/mm3      Basophils, Absolute 0.01 10*3/mm3      Immature Grans, Absolute 0.02 10*3/mm3     Urinalysis With / Culture If Indicated - Urine, Clean Catch [[id number]]  (Abnormal) Collected:  01/04/18 1100    Specimen:  Urine from Urine, Clean Catch Updated:  01/04/18 1128     Color, UA Yellow     Appearance, UA Clear     pH, UA <=5.0     Specific Gravity, UA 1.025     Glucose, UA Negative     Ketones, UA Negative     Bilirubin, UA Negative     Blood, UA Negative     Protein,  mg/dL (2+) (A)     Leuk Esterase, UA Negative     Nitrite, UA Negative     Urobilinogen, UA 0.2 E.U./dL    Urinalysis, Microscopic Only - Urine, Clean Catch [[id number]] Collected:  01/04/18 1100    Specimen:  Urine from Urine, Clean Catch Updated:  01/04/18 1128     RBC, UA 0-2 /HPF      WBC, UA 0-2 /HPF      Bacteria, UA None Seen /HPF      Squamous Epithelial Cells, UA 0-2 /HPF      Hyaline Casts, UA 3-6 /LPF      Methodology Automated Microscopy          I ordered the above labs and reviewed the results    RADIOLOGY  CT Abdomen Pelvis With Contrast   Preliminary Result   1. Significantly circumferentially thickened distal esophagus and GE   junction. Please correlate clinically for esophagitis and followup is   recommended.   2. There is a paucity of formed stool within the colon, but there is no   evidence for colitis.       Discussed with Dr. Pina.          CT abd/pelvis: thickening of the distal esophagus wall, otherwise negative acute    I ordered the above noted radiological studies.  Interpreted by radiologist. Discussed with radiologist (Dr. Ma). Reviewed by me in PACS.       PROCEDURES  Procedures      PROGRESS AND CONSULTS  ED Course     11:41 AM  Ordered CT abd/pelvis for further evaluation.     1:50 PM  Pt rechecked and is resting comfortably. BP- 177/90 HR- 74 Temp- 97.3 °F (36.3 °C) O2 sat- 98%. All pertinent lab/imaging/EKG findings discussed including thickening of the esophagus but nothing else acute seen on CT. Pt states hx of GERD and he has been taking Pepto Bismol without relief.  Pt/family verbalized understanding and agree with plan to discharge home with medication to control nausea and epigastric pain. All questions and concerns addressed at this time.       MEDICAL DECISION MAKING  Results were reviewed/discussed with the patient and they were also made aware of online access. Pt also made aware that some labs, such as cultures, will not be resulted during ER visit and follow up with PMD is necessary.     MDM  Number of Diagnoses or Management Options  Gastroesophageal reflux disease with esophagitis:   Viral gastroenteritis:      Amount and/or Complexity of Data Reviewed  Clinical lab tests: reviewed and ordered (Creatinine: 1.47)  Tests in the radiology section of CPT®: ordered and reviewed (CT abd/pelvis: thickening of the distal esophagus, otherwise negative acute)  Discussion of test results with the performing providers: yes (Dr. Ma)  Decide to obtain previous medical records or to obtain history from someone other than the patient: yes  Independent visualization of images, tracings, or specimens: yes    Patient Progress  Patient progress: stable         DIAGNOSIS  Final diagnoses:   Viral gastroenteritis   Gastroesophageal reflux disease with esophagitis       DISPOSITION  Disposition: Discharged.    Patient discharged in stable condition.    Reviewed implications of results, diagnosis, meds, responsibility to follow up, warning signs and symptoms of possible  worsening, potential complications and reasons to return to ER.    Patient/Family voiced understanding of above instructions.    Discussed plan for discharge, as there is no emergent indication for admission.  Pt/family is agreeable and understands need for follow up and repeat testing.  Pt is aware that discharge does not mean that nothing is wrong but it indicates no emergency is present and they must continue care with follow-up as given below or physician of their choice.     FOLLOW-UP  Luis Burrows Jr., MD  [address] [place], [place] [id number] [phone]    In 3 days  If symptoms worsen         Medication List      New Prescriptions          ondansetron 4 MG tablet   Commonly known as:  ZOFRAN   Take 1 tablet by mouth Every 6 (Six) Hours As Needed for Nausea or   Vomiting.       pantoprazole 20 MG EC tablet   Commonly known as:  PROTONIX   Take 1 tablet by mouth Daily.             Latest Documented Vital Signs:  As of 2:23 PM  BP- (!) 184/91 HR- 68 Temp- 97.3 °F (36.3 °C) O2 sat- 96%    --  Documentation assistance provided by eliseo Zendejas for Dr. Pina.  Information recorded by the scribe was done at my direction and has been verified and validated by me.          Alix Zendejas  01/04/18 1425       Mickey Pina MD  01/04/18 7191       Mickey Pina MD  01/04/18 3761

## 2018-01-05 ENCOUNTER — TELEPHONE (OUTPATIENT)
Dept: SOCIAL WORK | Facility: HOSPITAL | Age: 83
End: 2018-01-05

## 2018-01-25 ENCOUNTER — TELEPHONE (OUTPATIENT)
Dept: FAMILY MEDICINE CLINIC | Facility: CLINIC | Age: 83
End: 2018-01-25

## 2018-01-25 DIAGNOSIS — R35.0 URINE FREQUENCY: ICD-10-CM

## 2018-01-25 DIAGNOSIS — I10 HYPERTENSION, ESSENTIAL: ICD-10-CM

## 2018-01-25 DIAGNOSIS — E11.9 DIABETES MELLITUS WITHOUT COMPLICATION (HCC): ICD-10-CM

## 2018-01-25 DIAGNOSIS — E78.2 MIXED HYPERLIPIDEMIA: ICD-10-CM

## 2018-01-25 NOTE — TELEPHONE ENCOUNTER
WOULD LIKE A CALL BACK ABOUT A REFERRAL TO A PODIATRIST FOR HIS FEET, HE IS A DIABETIC, AND IS HAVING SOME FEET ISSUES TOENAILS CUT ETC. HE WOULD LIKE TO GO TO [facility] ON [address]

## 2018-01-26 DIAGNOSIS — E08.69 DIABETES DUE TO UNDERLYING CONDITION W OTH COMPLICATION (HCC): Primary | ICD-10-CM

## 2018-01-26 RX ORDER — EZETIMIBE 10 MG/1
TABLET ORAL
Qty: 90 TABLET | Refills: 0 | Status: SHIPPED | OUTPATIENT
Start: 2018-01-26 | End: 2018-05-03 | Stop reason: SDUPTHER

## 2018-02-02 ENCOUNTER — TELEPHONE (OUTPATIENT)
Dept: FAMILY MEDICINE CLINIC | Facility: CLINIC | Age: 83
End: 2018-02-02

## 2018-02-02 NOTE — TELEPHONE ENCOUNTER
PT WIFE WANTS TO KNOW WHAT HIS MEDS ARE FOR ZETIA, GLIMEPIRIDE,AMLODIPINE,TRADJENTA,LIVALO,AND METOPROLOL AND WHY HE TAKING THEM

## 2018-02-07 ENCOUNTER — OFFICE VISIT (OUTPATIENT)
Dept: CARDIOLOGY | Facility: CLINIC | Age: 83
End: 2018-02-07

## 2018-02-07 VITALS
SYSTOLIC BLOOD PRESSURE: 154 MMHG | HEIGHT: 68 IN | BODY MASS INDEX: 23.64 KG/M2 | DIASTOLIC BLOOD PRESSURE: 92 MMHG | HEART RATE: 66 BPM | WEIGHT: 156 LBS

## 2018-02-07 DIAGNOSIS — I25.10 CORONARY ARTERY DISEASE INVOLVING NATIVE CORONARY ARTERY OF NATIVE HEART WITHOUT ANGINA PECTORIS: Primary | ICD-10-CM

## 2018-02-07 DIAGNOSIS — I10 ESSENTIAL HYPERTENSION: ICD-10-CM

## 2018-02-07 PROCEDURE — 99214 OFFICE O/P EST MOD 30 MIN: CPT | Performed by: INTERNAL MEDICINE

## 2018-02-07 PROCEDURE — 93000 ELECTROCARDIOGRAM COMPLETE: CPT | Performed by: INTERNAL MEDICINE

## 2018-02-07 RX ORDER — LISINOPRIL 10 MG/1
10 TABLET ORAL DAILY
Qty: 30 TABLET | Refills: 11 | Status: SHIPPED | OUTPATIENT
Start: 2018-02-07 | End: 2018-05-21

## 2018-02-07 NOTE — PROGRESS NOTES
Date of Office Visit: 2018  Encounter Provider: Raj Vidales MD  Place of Service: [facility] CARDIOLOGY  Patient Name: Gera Lira  :1935      Chief Complaint   Patient presents with   • Coronary Artery Disease     History of Present Illness    The patient is an 82-year-old black male who enters the office today for follow-up.  It has been 2 years since I saw last saw him.  He reports that he is doing well.  He does not have any complaints at this time of chest pain, shortness of breath, lightheadedness, dizziness, orthopnea.  I questioned him about his blood pressure and he does report that his pressures are in the 150 systolic range and over 90 diastolic.    Past Medical History:   Diagnosis Date   • Acute inferior myocardial infarction    • Angina, class III    • Cardiomyopathy    • Coronary artery disease    • Diabetes mellitus    • Dyslipidemia    • Essential hypertension    • Health care maintenance    • History of penile cancer    • Hyperinsulinism    • Hyperlipidemia    • Hypertension    • MI (myocardial infarction)    • Prostate cancer    • S/P CABG (coronary artery bypass graft)          Past Surgical History:   Procedure Laterality Date   • BACK SURGERY     • CARDIAC CATHETERIZATION     • CORONARY ARTERY BYPASS GRAFT     • OTHER SURGICAL HISTORY      CARDIAC CATH PROCEDURE SUMMARY   • OTHER SURGICAL HISTORY      RADIATION THERAPY           Current Outpatient Prescriptions:   •  amLODIPine (NORVASC) 10 MG tablet, Take 1 tablet by mouth Daily., Disp: 90 tablet, Rfl: 3  •  aspirin 81 MG tablet, Take 1 tablet by mouth daily., Disp: , Rfl:   •  glimepiride (AMARYL) 1 MG tablet, Take 1 tablet by mouth Every Morning Before Breakfast., Disp: 30 tablet, Rfl: 2  •  glucose blood test strip, Test once daily, Disp: 30 each, Rfl: 12  •  linagliptin (TRADJENTA) 5 MG tablet tablet, Take 1 tablet by mouth Daily., Disp: 90 tablet, Rfl: 3  •  metoprolol succinate XL  "(TOPROL-XL) 50 MG 24 hr tablet, Take 1 tablet by mouth Daily., Disp: 90 tablet, Rfl: 3  •  Omega-3 Fatty Acids (FISH OIL) 1200 MG capsule capsule, Take by mouth., Disp: , Rfl:   •  pitavastatin calcium (LIVALO) 2 MG tablet tablet, Take 1 tablet by mouth Every Night., Disp: 90 tablet, Rfl: 3  •  tadalafil (CIALIS) 20 MG tablet, Take 1 tablet by mouth Daily As Needed for erectile dysfunction., Disp: 9 tablet, Rfl: 5  •  ZETIA 10 MG tablet, TAKE 1 TABLET BY MOUTH EVERY DAY, Disp: 90 tablet, Rfl: 0  •  lisinopril (PRINIVIL,ZESTRIL) 10 MG tablet, Take 1 tablet by mouth Daily., Disp: 30 tablet, Rfl: 11      Social History     Social History   • Marital status:      Spouse name: N/A   • Number of children: N/A   • Years of education: N/A     Occupational History   • Not on file.     Social History Main Topics   • Smoking status: Former Smoker   • Smokeless tobacco: Not on file   • Alcohol use No   • Drug use: No   • Sexual activity: Defer     Other Topics Concern   • Not on file     Social History Narrative         Review of Systems   Constitution: Negative.   HENT: Negative.    Eyes: Negative.    Cardiovascular: Negative.    Respiratory: Negative.    Endocrine: Negative.    Skin: Negative.    Musculoskeletal: Negative.    Gastrointestinal: Negative.    Neurological: Negative.    Psychiatric/Behavioral: Negative.        Procedures      ECG 12 Lead  Date/Time: 2/7/2018 11:01 AM  Performed by: JOAQUIM GONZALEZ  Authorized by: JOAQUIM GONZALEZ   Comparison: compared with previous ECG from 2/7/2017  Similar to previous ECG  Rhythm: sinus rhythm  Rate: normal  Conduction: conduction normal  ST Segments: ST segments normal  T Waves: T waves normal  QRS axis: normal                 Objective:    /92  Pulse 66  Ht 172.7 cm (68\")  Wt 70.8 kg (156 lb)  BMI 23.72 kg/m2        Physical Exam   Constitutional: He is oriented to person, place, and time. He appears well-developed and well-nourished.   HENT:   Head: " Normocephalic.   Eyes: Pupils are equal, round, and reactive to light.   Neck: Normal range of motion. No JVD present. Carotid bruit is not present. No thyromegaly present.   Cardiovascular: Normal rate, regular rhythm, S1 normal, S2 normal, normal heart sounds and intact distal pulses.  Exam reveals no gallop and no friction rub.    No murmur heard.  Pulmonary/Chest: Effort normal and breath sounds normal.   Abdominal: Soft. Bowel sounds are normal.   Musculoskeletal: He exhibits no edema.   Neurological: He is alert and oriented to person, place, and time.   Skin: Skin is warm, dry and intact. No erythema.   Psychiatric: He has a normal mood and affect.   Vitals reviewed.          Assessment:       Diagnosis Plan   1. Coronary artery disease involving native coronary artery of native heart without angina pectoris     2. Essential hypertension  lisinopril (PRINIVIL,ZESTRIL) 10 MG tablet     Coronary Artery Disease  Assessment  • The patient has no angina    Plan  • Lifestyle modifications discussed include adhering to a heart healthy diet, maintenance of a healthy weight and regular exercise    Subjective - Objective  • There is a history of previous coronary artery bypass graft  • Current antiplatelet therapy includes aspirin 81 mg    Hypertension: Add lisinopril 10 mg daily.  He will call in a month with updated readings  Diabetes mellitus: Type II, on medical therapy  Hyperlipidemia: On medical therapy       Plan:     Patient has been encouraged to be more physically active.  He will report his blood pressure in the next month.

## 2018-03-13 DIAGNOSIS — I10 HYPERTENSION, ESSENTIAL: ICD-10-CM

## 2018-03-13 DIAGNOSIS — E11.9 DIABETES MELLITUS WITHOUT COMPLICATION (HCC): ICD-10-CM

## 2018-03-13 DIAGNOSIS — R35.0 URINE FREQUENCY: ICD-10-CM

## 2018-03-13 DIAGNOSIS — E78.2 MIXED HYPERLIPIDEMIA: ICD-10-CM

## 2018-03-13 RX ORDER — TADALAFIL 20 MG
TABLET ORAL
Qty: 9 TABLET | Refills: 0 | Status: SHIPPED | OUTPATIENT
Start: 2018-03-13 | End: 2018-06-21 | Stop reason: SDUPTHER

## 2018-05-03 DIAGNOSIS — I10 HYPERTENSION, ESSENTIAL: ICD-10-CM

## 2018-05-03 DIAGNOSIS — R35.0 URINE FREQUENCY: ICD-10-CM

## 2018-05-03 DIAGNOSIS — E11.9 DIABETES MELLITUS WITHOUT COMPLICATION (HCC): ICD-10-CM

## 2018-05-03 DIAGNOSIS — E78.2 MIXED HYPERLIPIDEMIA: ICD-10-CM

## 2018-05-03 RX ORDER — EZETIMIBE 10 MG/1
TABLET ORAL
Qty: 90 TABLET | Refills: 0 | Status: SHIPPED | OUTPATIENT
Start: 2018-05-03 | End: 2018-08-22 | Stop reason: SDUPTHER

## 2018-05-14 RX ORDER — METOPROLOL SUCCINATE 50 MG/1
50 TABLET, EXTENDED RELEASE ORAL DAILY
Qty: 90 TABLET | Refills: 0 | Status: SHIPPED | OUTPATIENT
Start: 2018-05-14 | End: 2018-09-04 | Stop reason: SDUPTHER

## 2018-05-17 ENCOUNTER — TELEPHONE (OUTPATIENT)
Dept: FAMILY MEDICINE CLINIC | Facility: CLINIC | Age: 83
End: 2018-05-17

## 2018-05-17 NOTE — TELEPHONE ENCOUNTER
PATIENT IS ON METOPROLOL AND HAS A BAD COUGH. THAT'S ONE OF THE SIDE EFFECTS FOR THIS MEDICATION COULD THIS BE CAUSING HIS COUGH

## 2018-05-17 NOTE — TELEPHONE ENCOUNTER
One of several possibilities need more history.  Patient probably need to be seen to make further determination

## 2018-05-18 ENCOUNTER — TELEPHONE (OUTPATIENT)
Dept: FAMILY MEDICINE CLINIC | Facility: CLINIC | Age: 83
End: 2018-05-18

## 2018-05-18 RX ORDER — GLIMEPIRIDE 2 MG/1
2 TABLET ORAL
Qty: 90 TABLET | Refills: 1 | Status: SHIPPED | OUTPATIENT
Start: 2018-05-18 | End: 2019-10-31 | Stop reason: SDUPTHER

## 2018-05-18 RX ORDER — GLIMEPIRIDE 2 MG/1
2 TABLET ORAL
Qty: 30 TABLET | Refills: 1 | Status: SHIPPED | OUTPATIENT
Start: 2018-05-18 | End: 2018-05-18 | Stop reason: SDUPTHER

## 2018-05-18 NOTE — TELEPHONE ENCOUNTER
PT CALLING BECAUSE HE WANTS HIS RX FOR TOPROL-XL CHANGED TO SOMETHING ELSE. PT STATES IT IS CAUSING HIM TO HAVE A DRY COUGH.    PT ALSO STATES THAT HE NEEDS A HIGHER DOSE OF RX FOR DIABETES, STATING THAT WHAT HE IS CURRENTLY ON ISN'T BRINGING HIS BLOOD SUGAR DOWN    PT TRIED TO MAKE AN APPOINTMENT TO BE SEEN, BUT STATES THAT HE CANNOT WAIT UNTIL June 1ST, WHICH IS WHEN DR. DARNELL'S NEXT AVAILABLE OFFICE VISIT IS

## 2018-05-18 NOTE — TELEPHONE ENCOUNTER
Her diabetes is he still taken Tradjenta 5 mg a day and is he on Amaryl 1 mg a day if so water per his numbers.  Are probably will increase his Amaryl dosage but let me know.  Regarding metoprolol we discussed specifically the dose on that also I suspect his cardiologist started him on that due to coronary artery disease and I would have him check with Dr. Forrester throat see if they want him continue that her want to switch him something else the beta blocker line which does have cardiac protective qualities.

## 2018-05-18 NOTE — TELEPHONE ENCOUNTER
Pt will check with his cardiologist regarding metoprolol  He is only taking tradjenta for diabetes  His blood sugar is running in the 200's

## 2018-05-21 ENCOUNTER — TELEPHONE (OUTPATIENT)
Dept: CARDIOLOGY | Facility: CLINIC | Age: 83
End: 2018-05-21

## 2018-05-21 RX ORDER — LOSARTAN POTASSIUM 25 MG/1
25 TABLET ORAL DAILY
Qty: 90 TABLET | Refills: 3 | Status: SHIPPED | OUTPATIENT
Start: 2018-05-21 | End: 2018-05-25 | Stop reason: SINTOL

## 2018-05-25 RX ORDER — HYDROCHLOROTHIAZIDE 25 MG/1
25 TABLET ORAL DAILY
Qty: 30 TABLET | Refills: 11 | Status: SHIPPED | OUTPATIENT
Start: 2018-05-25 | End: 2020-11-24

## 2018-05-25 NOTE — TELEPHONE ENCOUNTER
Ida sent a fax that the Losartan is causing the patient an itchy rash on his back & side. He is still coughing, but he's only been off Lisinopril for a couple of days. Ida # [phone]. dmk

## 2018-06-21 DIAGNOSIS — I10 HYPERTENSION, ESSENTIAL: ICD-10-CM

## 2018-06-21 DIAGNOSIS — R35.0 URINE FREQUENCY: ICD-10-CM

## 2018-06-21 DIAGNOSIS — E11.9 DIABETES MELLITUS WITHOUT COMPLICATION (HCC): ICD-10-CM

## 2018-06-21 DIAGNOSIS — E78.2 MIXED HYPERLIPIDEMIA: ICD-10-CM

## 2018-06-21 RX ORDER — TADALAFIL 20 MG
TABLET ORAL
Qty: 9 TABLET | Refills: 0 | Status: SHIPPED | OUTPATIENT
Start: 2018-06-21 | End: 2018-08-21 | Stop reason: SDUPTHER

## 2018-06-25 DIAGNOSIS — E11.9 DIABETES MELLITUS WITHOUT COMPLICATION (HCC): ICD-10-CM

## 2018-06-25 DIAGNOSIS — R35.0 URINE FREQUENCY: ICD-10-CM

## 2018-06-25 DIAGNOSIS — E78.2 MIXED HYPERLIPIDEMIA: ICD-10-CM

## 2018-06-25 DIAGNOSIS — I10 HYPERTENSION, ESSENTIAL: ICD-10-CM

## 2018-06-25 RX ORDER — AMLODIPINE BESYLATE 10 MG/1
10 TABLET ORAL DAILY
Qty: 90 TABLET | Refills: 0 | Status: SHIPPED | OUTPATIENT
Start: 2018-06-25 | End: 2018-10-21 | Stop reason: SDUPTHER

## 2018-07-30 RX ORDER — PITAVASTATIN CALCIUM 2.09 MG/1
2 TABLET, FILM COATED ORAL NIGHTLY
Qty: 90 TABLET | Refills: 0 | Status: SHIPPED | OUTPATIENT
Start: 2018-07-30 | End: 2018-11-09 | Stop reason: SDUPTHER

## 2018-08-01 ENCOUNTER — TELEPHONE (OUTPATIENT)
Dept: FAMILY MEDICINE CLINIC | Facility: CLINIC | Age: 83
End: 2018-08-01

## 2018-08-17 ENCOUNTER — EPISODE CHANGES (OUTPATIENT)
Dept: CASE MANAGEMENT | Facility: OTHER | Age: 83
End: 2018-08-17

## 2018-08-21 DIAGNOSIS — R35.0 URINE FREQUENCY: ICD-10-CM

## 2018-08-21 DIAGNOSIS — E11.9 DIABETES MELLITUS WITHOUT COMPLICATION (HCC): ICD-10-CM

## 2018-08-21 DIAGNOSIS — E78.2 MIXED HYPERLIPIDEMIA: ICD-10-CM

## 2018-08-21 DIAGNOSIS — I10 HYPERTENSION, ESSENTIAL: ICD-10-CM

## 2018-08-22 DIAGNOSIS — I10 HYPERTENSION, ESSENTIAL: ICD-10-CM

## 2018-08-22 DIAGNOSIS — R35.0 URINE FREQUENCY: ICD-10-CM

## 2018-08-22 DIAGNOSIS — E78.2 MIXED HYPERLIPIDEMIA: ICD-10-CM

## 2018-08-22 DIAGNOSIS — E11.9 DIABETES MELLITUS WITHOUT COMPLICATION (HCC): ICD-10-CM

## 2018-08-22 RX ORDER — EZETIMIBE 10 MG/1
TABLET ORAL
Qty: 90 TABLET | Refills: 0 | Status: SHIPPED | OUTPATIENT
Start: 2018-08-22 | End: 2018-12-17 | Stop reason: SDUPTHER

## 2018-08-22 RX ORDER — TADALAFIL 20 MG
TABLET ORAL
Qty: 9 TABLET | Refills: 0 | Status: SHIPPED | OUTPATIENT
Start: 2018-08-22 | End: 2018-10-15 | Stop reason: SDUPTHER

## 2018-09-04 RX ORDER — METOPROLOL SUCCINATE 50 MG/1
50 TABLET, EXTENDED RELEASE ORAL DAILY
Qty: 90 TABLET | Refills: 0 | Status: SHIPPED | OUTPATIENT
Start: 2018-09-04 | End: 2019-01-04 | Stop reason: SDUPTHER

## 2018-10-15 ENCOUNTER — OFFICE VISIT (OUTPATIENT)
Dept: FAMILY MEDICINE CLINIC | Facility: CLINIC | Age: 83
End: 2018-10-15

## 2018-10-15 VITALS
DIASTOLIC BLOOD PRESSURE: 80 MMHG | SYSTOLIC BLOOD PRESSURE: 140 MMHG | BODY MASS INDEX: 23.28 KG/M2 | HEART RATE: 80 BPM | HEIGHT: 68 IN | OXYGEN SATURATION: 96 % | TEMPERATURE: 97.7 F | WEIGHT: 153.6 LBS

## 2018-10-15 DIAGNOSIS — I10 HYPERTENSION, ESSENTIAL: ICD-10-CM

## 2018-10-15 DIAGNOSIS — E78.5 HYPERLIPIDEMIA, UNSPECIFIED HYPERLIPIDEMIA TYPE: ICD-10-CM

## 2018-10-15 DIAGNOSIS — Z12.5 SCREENING FOR PROSTATE CANCER: ICD-10-CM

## 2018-10-15 DIAGNOSIS — E11.9 DIABETES MELLITUS WITHOUT COMPLICATION (HCC): ICD-10-CM

## 2018-10-15 DIAGNOSIS — Z85.46 HISTORY OF PROSTATE CANCER: Primary | ICD-10-CM

## 2018-10-15 LAB
ALBUMIN SERPL-MCNC: 4.7 G/DL (ref 3.5–5.2)
ALBUMIN UR-MCNC: 100 MG/L (ref 0–20)
ALBUMIN/GLOB SERPL: 1.6 G/DL
ALP SERPL-CCNC: 52 U/L (ref 39–117)
ALT SERPL W P-5'-P-CCNC: 17 U/L (ref 1–41)
ANION GAP SERPL CALCULATED.3IONS-SCNC: 13.1 MMOL/L
AST SERPL-CCNC: 16 U/L (ref 1–40)
BILIRUB SERPL-MCNC: 0.2 MG/DL (ref 0.1–1.2)
BUN BLD-MCNC: 14 MG/DL (ref 8–23)
BUN/CREAT SERPL: 9.5 (ref 7–25)
CALCIUM SPEC-SCNC: 9.7 MG/DL (ref 8.6–10.5)
CHLORIDE SERPL-SCNC: 103 MMOL/L (ref 98–107)
CHOLEST SERPL-MCNC: 144 MG/DL (ref 0–200)
CO2 SERPL-SCNC: 29.9 MMOL/L (ref 22–29)
CREAT BLD-MCNC: 1.47 MG/DL (ref 0.76–1.27)
GFR SERPL CREATININE-BSD FRML MDRD: 55 ML/MIN/1.73
GLOBULIN UR ELPH-MCNC: 2.9 GM/DL
GLUCOSE BLD-MCNC: 113 MG/DL (ref 65–99)
HBA1C MFR BLD: 6.1 % (ref 4.8–5.6)
HDLC SERPL-MCNC: 69 MG/DL (ref 40–60)
LDLC SERPL CALC-MCNC: 49 MG/DL (ref 0–100)
LDLC/HDLC SERPL: 0.7 {RATIO}
POTASSIUM BLD-SCNC: 4 MMOL/L (ref 3.5–5.2)
PROT SERPL-MCNC: 7.6 G/DL (ref 6–8.5)
PSA SERPL-MCNC: 0.89 NG/ML (ref 0–4)
SODIUM BLD-SCNC: 146 MMOL/L (ref 136–145)
TRIGL SERPL-MCNC: 132 MG/DL (ref 0–150)
VLDLC SERPL-MCNC: 26.4 MG/DL (ref 5–40)

## 2018-10-15 PROCEDURE — 80061 LIPID PANEL: CPT | Performed by: INTERNAL MEDICINE

## 2018-10-15 PROCEDURE — 36415 COLL VENOUS BLD VENIPUNCTURE: CPT | Performed by: INTERNAL MEDICINE

## 2018-10-15 PROCEDURE — 82043 UR ALBUMIN QUANTITATIVE: CPT | Performed by: INTERNAL MEDICINE

## 2018-10-15 PROCEDURE — G0103 PSA SCREENING: HCPCS | Performed by: INTERNAL MEDICINE

## 2018-10-15 PROCEDURE — 80053 COMPREHEN METABOLIC PANEL: CPT | Performed by: INTERNAL MEDICINE

## 2018-10-15 PROCEDURE — 90662 IIV NO PRSV INCREASED AG IM: CPT | Performed by: INTERNAL MEDICINE

## 2018-10-15 PROCEDURE — 83036 HEMOGLOBIN GLYCOSYLATED A1C: CPT | Performed by: INTERNAL MEDICINE

## 2018-10-15 PROCEDURE — 99214 OFFICE O/P EST MOD 30 MIN: CPT | Performed by: INTERNAL MEDICINE

## 2018-10-15 PROCEDURE — G0008 ADMIN INFLUENZA VIRUS VAC: HCPCS | Performed by: INTERNAL MEDICINE

## 2018-10-15 NOTE — PROGRESS NOTES
Subjective   Gera Lira is a 83 y.o. male.   Follow-up on diabetes, lipids, high blood pressure also needs updated PSA  History of Present Illness   As above we'll get updated labs regarding his diabetes lipids and hypertension complaints this patient's knowledge his blood pressure satisfactory at home.  Is followed by urology also although I do not see any recent PSA listings on patient will get that and await pending lab work.  Overall doing fairly well patient's drug as reviewed and are none.  Family history positive for hypertension heart disease and diabetes.  Patient is a former smoker not current no heavy EtOH reported no drug allergies    Review of Systems   All other systems reviewed and are negative.      Objective   Vitals:    10/15/18 1439   BP: 140/80   Pulse: 80   Temp: 97.7 °F (36.5 °C)   SpO2: 96%   Weight: 69.7 kg (153 lb 9.6 oz)     Physical Exam   Constitutional: He appears well-developed and well-nourished.   HENT:   Head: Normocephalic and atraumatic.   Eyes: Pupils are equal, round, and reactive to light. Conjunctivae are normal.   Cardiovascular: Normal rate, regular rhythm and normal heart sounds.    Pulmonary/Chest: Effort normal and breath sounds normal.   Abdominal: Soft. Bowel sounds are normal.   Neurological: He is alert.   Unremarkable gait and station   Skin: Skin is warm and dry.   Nursing note reviewed.      Lab Results   Component Value Date    INR 1.1 12/23/2014    INR 1.4 (H) 12/22/2014    INR 1.0 12/20/2014       Procedures    Assessment/Plan     1.  Hypertension controlled plan continue present medicine recheck in 6 months continue monitor blood pressure    2.  Type 2 diabetes plan await pending lab if satisfactory follow-up 6 months ago     3.  Hyperlipidemia again await forthcoming lab work with follow-up in 6 months if okay    4.  Screening PSA with personal history for prostate cancer    Much of this encounter note is an electronic transcription/translation of spoken  language to printed text.  The electronic translation of spoken language may permit erroneous, or at times, nonsensical words or phrases to be inadvertently transcribed.  Although I have reviewed the note for such errors, some may still exist. If there are questions or for further clarification, please contact me.

## 2018-10-16 ENCOUNTER — TELEPHONE (OUTPATIENT)
Dept: FAMILY MEDICINE CLINIC | Facility: CLINIC | Age: 83
End: 2018-10-16

## 2018-10-16 DIAGNOSIS — R79.89 ELEVATED SERUM CREATININE: Primary | ICD-10-CM

## 2018-10-16 NOTE — TELEPHONE ENCOUNTER
PATIENT STATED THAT DR. DARNELL WANTS PATIENT TO GO TO A KIDNEY DR. I DON'T SEE A REFERRAL IN HIS CHART.

## 2018-10-17 ENCOUNTER — EPISODE CHANGES (OUTPATIENT)
Dept: CASE MANAGEMENT | Facility: OTHER | Age: 83
End: 2018-10-17

## 2018-10-18 ENCOUNTER — EPISODE CHANGES (OUTPATIENT)
Dept: CASE MANAGEMENT | Facility: OTHER | Age: 83
End: 2018-10-18

## 2018-10-21 DIAGNOSIS — E78.2 MIXED HYPERLIPIDEMIA: ICD-10-CM

## 2018-10-21 DIAGNOSIS — I10 HYPERTENSION, ESSENTIAL: ICD-10-CM

## 2018-10-21 DIAGNOSIS — R35.0 URINE FREQUENCY: ICD-10-CM

## 2018-10-21 DIAGNOSIS — E11.9 DIABETES MELLITUS WITHOUT COMPLICATION (HCC): ICD-10-CM

## 2018-10-22 RX ORDER — AMLODIPINE BESYLATE 10 MG/1
10 TABLET ORAL DAILY
Qty: 90 TABLET | Refills: 0 | Status: SHIPPED | OUTPATIENT
Start: 2018-10-22 | End: 2019-02-26 | Stop reason: SDUPTHER

## 2018-11-09 DIAGNOSIS — E11.9 DIABETES MELLITUS WITHOUT COMPLICATION (HCC): ICD-10-CM

## 2018-11-09 DIAGNOSIS — E78.2 MIXED HYPERLIPIDEMIA: ICD-10-CM

## 2018-11-09 DIAGNOSIS — R35.0 URINE FREQUENCY: ICD-10-CM

## 2018-11-09 DIAGNOSIS — I10 HYPERTENSION, ESSENTIAL: ICD-10-CM

## 2018-11-09 RX ORDER — PITAVASTATIN CALCIUM 2.09 MG/1
2 TABLET, FILM COATED ORAL NIGHTLY
Qty: 90 TABLET | Refills: 0 | Status: SHIPPED | OUTPATIENT
Start: 2018-11-09 | End: 2019-03-25 | Stop reason: SDUPTHER

## 2018-11-09 RX ORDER — TADALAFIL 20 MG
TABLET ORAL
Qty: 9 TABLET | Refills: 0 | Status: SHIPPED | OUTPATIENT
Start: 2018-11-09 | End: 2019-02-19 | Stop reason: SDUPTHER

## 2018-12-01 ENCOUNTER — APPOINTMENT (OUTPATIENT)
Dept: GENERAL RADIOLOGY | Facility: HOSPITAL | Age: 83
End: 2018-12-01

## 2018-12-01 ENCOUNTER — HOSPITAL ENCOUNTER (EMERGENCY)
Facility: HOSPITAL | Age: 83
Discharge: HOME | End: 2018-12-01
Attending: EMERGENCY MEDICINE | Admitting: EMERGENCY MEDICINE

## 2018-12-01 VITALS
WEIGHT: 150 LBS | HEART RATE: 83 BPM | BODY MASS INDEX: 22.73 KG/M2 | HEIGHT: 68 IN | OXYGEN SATURATION: 98 % | DIASTOLIC BLOOD PRESSURE: 86 MMHG | RESPIRATION RATE: 18 BRPM | SYSTOLIC BLOOD PRESSURE: 145 MMHG | TEMPERATURE: 98.9 F

## 2018-12-01 DIAGNOSIS — J06.9 UPPER RESPIRATORY TRACT INFECTION, UNSPECIFIED TYPE: Primary | ICD-10-CM

## 2018-12-01 LAB
FLUAV AG NPH QL: NEGATIVE
FLUBV AG NPH QL IA: NEGATIVE

## 2018-12-01 PROCEDURE — 99283 EMERGENCY DEPT VISIT LOW MDM: CPT

## 2018-12-01 PROCEDURE — 71046 X-RAY EXAM CHEST 2 VIEWS: CPT

## 2018-12-01 PROCEDURE — 87804 INFLUENZA ASSAY W/OPTIC: CPT | Performed by: EMERGENCY MEDICINE

## 2018-12-01 RX ORDER — GUAIFENESIN AND DEXTROMETHORPHAN HYDROBROMIDE 600; 30 MG/1; MG/1
1 TABLET, EXTENDED RELEASE ORAL EVERY 12 HOURS SCHEDULED
Qty: 1 EACH | Refills: 0 | Status: SHIPPED | OUTPATIENT
Start: 2018-12-01 | End: 2020-12-07

## 2018-12-01 RX ORDER — AZITHROMYCIN 250 MG/1
TABLET, FILM COATED ORAL
Qty: 6 TABLET | Refills: 0 | Status: SHIPPED | OUTPATIENT
Start: 2018-12-01 | End: 2020-04-24

## 2018-12-01 NOTE — ED PROVIDER NOTES
EMERGENCY DEPARTMENT ENCOUNTER    CHIEF COMPLAINT  Chief Complaint: Cough, nasal congestion  History given by: Patient  History limited by: none  Room Number: 06/06  PMD: Luis Burrows Jr., MD      HPI:  Pt is a 83 y.o. male who presents complaining of productive cough with yellow sputum and congestion that began two days ago. Pt also c/o mild SOA, but denies chills, fever, generalized myalgia, N/V, or appetite change. Pt denies smoking. Pt states he has had his flu shot.    Duration:  Two days  Onset: gradual  Timing: constant  Location: n/a  Radiation: none  Quality: productive cough with yellow sputum and congestion  Intensity/Severity: moderate  Progression: unchanged  Associated Symptoms: mild SOA  Aggravating Factors: none  Alleviating Factors: none  Previous Episodes: none  Treatment before arrival: none    PAST MEDICAL HISTORY  Active Ambulatory Problems     Diagnosis Date Noted   • Right carotid bruit 02/07/2017   • Coronary artery disease    • Diabetes mellitus (CMS/HCC)    • Dyslipidemia    • Essential hypertension    • Stenosis of right carotid artery 02/27/2017     Resolved Ambulatory Problems     Diagnosis Date Noted   • No Resolved Ambulatory Problems     Past Medical History:   Diagnosis Date   • Acute inferior myocardial infarction (CMS/HCC)    • Angina, class III (CMS/HCC)    • Cardiomyopathy (CMS/HCC)    • Coronary artery disease    • Diabetes mellitus (CMS/HCC)    • Dyslipidemia    • Essential hypertension    • Health care maintenance    • History of penile cancer    • Hyperinsulinism    • Hyperlipidemia    • Hypertension    • MI (myocardial infarction) (CMS/HCC)    • Prostate cancer (CMS/HCC)    • S/P CABG (coronary artery bypass graft)        PAST SURGICAL HISTORY  Past Surgical History:   Procedure Laterality Date   • BACK SURGERY     • CARDIAC CATHETERIZATION     • COLONOSCOPY     • CORONARY ARTERY BYPASS GRAFT     • OTHER SURGICAL HISTORY      CARDIAC CATH PROCEDURE SUMMARY   • OTHER  SURGICAL HISTORY      RADIATION THERAPY       FAMILY HISTORY  Family History   Problem Relation Age of Onset   • Coronary artery disease Father    • Hypertension Father    • Breast cancer Sister    • Cerebral aneurysm Sister    • Diabetes Sister    • No Known Problems Mother        SOCIAL HISTORY  Social History     Socioeconomic History   • Marital status:      Spouse name: Not on file   • Number of children: Not on file   • Years of education: Not on file   • Highest education level: Not on file   Social Needs   • Financial resource strain: Not on file   • Food insecurity - worry: Not on file   • Food insecurity - inability: Not on file   • Transportation needs - medical: Not on file   • Transportation needs - non-medical: Not on file   Occupational History   • Not on file   Tobacco Use   • Smoking status: Former Smoker   • Smokeless tobacco: Never Used   Substance and Sexual Activity   • Alcohol use: No   • Drug use: No   • Sexual activity: Defer   Other Topics Concern   • Not on file   Social History Narrative   • Not on file       ALLERGIES  Patient has no known allergies.    REVIEW OF SYSTEMS  Review of Systems   Constitutional: Negative for activity change, appetite change, chills and fever.   HENT: Positive for congestion. Negative for sore throat.    Eyes: Negative.    Respiratory: Positive for cough (productive with yellow sputum) and shortness of breath (mild SOA).    Cardiovascular: Negative for chest pain and leg swelling.   Gastrointestinal: Negative for abdominal pain, diarrhea, nausea and vomiting.   Endocrine: Negative.    Genitourinary: Negative for decreased urine volume and dysuria.   Musculoskeletal: Negative for myalgias (generalized) and neck pain.   Skin: Negative for rash and wound.   Allergic/Immunologic: Negative.    Neurological: Negative for weakness, numbness and headaches.   Hematological: Negative.    Psychiatric/Behavioral: Negative.    All other systems reviewed and are  negative.      PHYSICAL EXAM  ED Triage Vitals [12/01/18 1419]   Temp Heart Rate Resp BP SpO2   98.9 °F (37.2 °C) 102 18 -- 98 %      Temp src Heart Rate Source Patient Position BP Location FiO2 (%)   Tympanic -- -- -- --       Physical Exam   Constitutional: No distress.   HENT:   Head: Normocephalic and atraumatic.   Right Ear: Tympanic membrane normal.   Left Ear: Tympanic membrane normal.   Nose: Mucosal edema present.   Post nasal drainage   Eyes: EOM are normal.   Neck: Normal range of motion. Neck supple.   Cardiovascular: Normal rate and regular rhythm.   Pulmonary/Chest: No respiratory distress. He has no wheezes. He has no rales.   Abdominal: Soft. Bowel sounds are normal. There is no tenderness.   Musculoskeletal: He exhibits no edema.   Neurological: He is alert.   Skin: Skin is warm and dry.   Nursing note and vitals reviewed.      LAB RESULTS  Lab Results (last 24 hours)     Procedure Component Value Units Date/Time    Influenza Antigen, Rapid - Swab, Nasopharynx [[id number]]  (Normal) Collected:  12/01/18 1438    Specimen:  Swab from Nasopharynx Updated:  12/01/18 1457     Influenza A Ag, EIA Negative     Influenza B Ag, EIA Negative          I ordered the above labs and reviewed the results    RADIOLOGY  XR Chest 2 View    (Results Pending)      CXR negative for PNA. Negative acute.     I ordered the above noted radiological studies. Interpreted by radiologist.  Reviewed by me in PACS.       PROCEDURES  Procedures      PROGRESS AND CONSULTS       1435  Ordered CXR and Flu swab.    1522  Rechecked pt. Pt is resting comfortably. Notified pt of CXR that shows no PNA and negative flu swab results. Discussed the plan to discharge the pt home with prescriptions for Zithromax and Mucinex DM. I instructed the pt to f/u with PCP. Pt understands and agrees with the plan, all questions answered.      MEDICAL DECISION MAKING  Results were reviewed/discussed with the patient and they were also made aware of online  access. Pt also made aware that some labs, such as cultures, will not be resulted during ER visit and follow up with PMD is necessary.     MDM  Number of Diagnoses or Management Options  Upper respiratory tract infection, unspecified type:      Amount and/or Complexity of Data Reviewed  Clinical lab tests: ordered and reviewed (Flu swabs negative.)  Tests in the radiology section of CPT®: ordered and reviewed (CXR shows no PNA)           DIAGNOSIS  Final diagnoses:   Upper respiratory tract infection, unspecified type       DISPOSITION  DISCHARGE    Patient discharged in stable condition.    Reviewed implications of results, diagnosis, meds, responsibility to follow up, warning signs and symptoms of possible worsening, potential complications and reasons to return to ER.    Patient/Family voiced understanding of above instructions.    Discussed plan for discharge, as there is no emergent indication for admission. Patient referred to primary care provider for BP management due to today's BP. Pt/family is agreeable and understands need for follow up and repeat testing.  Pt is aware that discharge does not mean that nothing is wrong but it indicates no emergency is present that requires admission and they must continue care with follow-up as given below or physician of their choice.     FOLLOW-UP  No follow-up provider specified.       Medication List      New Prescriptions    azithromycin 250 MG tablet  Commonly known as:  ZITHROMAX  Take 2 tablets the first day, then 1 tablet daily for 4 days.     guaifenesin-dextromethorphan  MG tablet sustained-release 12 hour   tablet  Take 1 tablet by mouth Every 12 (Twelve) Hours.              Latest Documented Vital Signs:  As of 3:24 PM  BP- 145/86 HR- 83 Temp- 98.9 °F (37.2 °C) (Tympanic) O2 sat- 98%    --  Documentation assistance provided by eliseo Dover for Dr. Hayes.  Information recorded by the eliseo was done at my direction and has been verified and  validated by me.            Livan Dover  12/01/18 1527       Denny Hayes MD  12/01/18 1540

## 2018-12-17 DIAGNOSIS — I10 HYPERTENSION, ESSENTIAL: ICD-10-CM

## 2018-12-17 DIAGNOSIS — E11.9 DIABETES MELLITUS WITHOUT COMPLICATION (HCC): ICD-10-CM

## 2018-12-17 DIAGNOSIS — E78.2 MIXED HYPERLIPIDEMIA: ICD-10-CM

## 2018-12-17 DIAGNOSIS — R35.0 URINE FREQUENCY: ICD-10-CM

## 2018-12-17 RX ORDER — LINAGLIPTIN 5 MG/1
TABLET, FILM COATED ORAL
Qty: 90 TABLET | Refills: 0 | Status: SHIPPED | OUTPATIENT
Start: 2018-12-17 | End: 2019-04-22 | Stop reason: SDUPTHER

## 2018-12-17 RX ORDER — EZETIMIBE 10 MG/1
TABLET ORAL
Qty: 90 TABLET | Refills: 0 | Status: SHIPPED | OUTPATIENT
Start: 2018-12-17 | End: 2019-04-22 | Stop reason: SDUPTHER

## 2019-01-07 RX ORDER — METOPROLOL SUCCINATE 50 MG/1
50 TABLET, EXTENDED RELEASE ORAL DAILY
Qty: 90 TABLET | Refills: 0 | Status: SHIPPED | OUTPATIENT
Start: 2019-01-07 | End: 2019-05-12 | Stop reason: SDUPTHER

## 2019-02-19 DIAGNOSIS — I10 HYPERTENSION, ESSENTIAL: ICD-10-CM

## 2019-02-19 DIAGNOSIS — E11.9 DIABETES MELLITUS WITHOUT COMPLICATION (HCC): ICD-10-CM

## 2019-02-19 DIAGNOSIS — E78.2 MIXED HYPERLIPIDEMIA: ICD-10-CM

## 2019-02-19 DIAGNOSIS — R35.0 URINE FREQUENCY: ICD-10-CM

## 2019-02-19 RX ORDER — TADALAFIL 20 MG
TABLET ORAL
Qty: 9 TABLET | Refills: 0 | Status: SHIPPED | OUTPATIENT
Start: 2019-02-19 | End: 2019-05-20 | Stop reason: SDUPTHER

## 2019-02-26 DIAGNOSIS — E11.9 DIABETES MELLITUS WITHOUT COMPLICATION (HCC): ICD-10-CM

## 2019-02-26 DIAGNOSIS — R35.0 URINE FREQUENCY: ICD-10-CM

## 2019-02-26 DIAGNOSIS — I10 HYPERTENSION, ESSENTIAL: ICD-10-CM

## 2019-02-26 DIAGNOSIS — E78.2 MIXED HYPERLIPIDEMIA: ICD-10-CM

## 2019-02-26 RX ORDER — AMLODIPINE BESYLATE 10 MG/1
10 TABLET ORAL DAILY
Qty: 90 TABLET | Refills: 1 | Status: SHIPPED | OUTPATIENT
Start: 2019-02-26 | End: 2020-02-26

## 2019-03-25 DIAGNOSIS — E78.2 MIXED HYPERLIPIDEMIA: ICD-10-CM

## 2019-03-25 DIAGNOSIS — E11.9 DIABETES MELLITUS WITHOUT COMPLICATION (HCC): ICD-10-CM

## 2019-03-25 DIAGNOSIS — I10 HYPERTENSION, ESSENTIAL: ICD-10-CM

## 2019-03-25 DIAGNOSIS — R35.0 URINE FREQUENCY: ICD-10-CM

## 2019-03-25 RX ORDER — AMLODIPINE BESYLATE 10 MG/1
10 TABLET ORAL DAILY
Qty: 90 TABLET | Refills: 0 | Status: SHIPPED | OUTPATIENT
Start: 2019-03-25 | End: 2019-12-17 | Stop reason: SDUPTHER

## 2019-03-25 RX ORDER — PITAVASTATIN CALCIUM 2.09 MG/1
2 TABLET, FILM COATED ORAL NIGHTLY
Qty: 90 TABLET | Refills: 0 | Status: SHIPPED | OUTPATIENT
Start: 2019-03-25 | End: 2019-07-18 | Stop reason: SDUPTHER

## 2019-03-26 DIAGNOSIS — I10 ESSENTIAL HYPERTENSION: ICD-10-CM

## 2019-03-26 RX ORDER — LISINOPRIL 10 MG/1
10 TABLET ORAL DAILY
Qty: 30 TABLET | Refills: 0 | Status: SHIPPED | OUTPATIENT
Start: 2019-03-26 | End: 2019-03-26 | Stop reason: SDUPTHER

## 2019-03-27 RX ORDER — LISINOPRIL 10 MG/1
TABLET ORAL
Qty: 90 TABLET | Refills: 0 | Status: SHIPPED | OUTPATIENT
Start: 2019-03-27 | End: 2019-10-29

## 2019-04-22 DIAGNOSIS — E78.2 MIXED HYPERLIPIDEMIA: ICD-10-CM

## 2019-04-22 DIAGNOSIS — E11.9 DIABETES MELLITUS WITHOUT COMPLICATION (HCC): ICD-10-CM

## 2019-04-22 DIAGNOSIS — I10 HYPERTENSION, ESSENTIAL: ICD-10-CM

## 2019-04-22 DIAGNOSIS — R35.0 URINE FREQUENCY: ICD-10-CM

## 2019-04-22 RX ORDER — LINAGLIPTIN 5 MG/1
TABLET, FILM COATED ORAL
Qty: 90 TABLET | Refills: 0 | Status: SHIPPED | OUTPATIENT
Start: 2019-04-22 | End: 2019-08-10 | Stop reason: SDUPTHER

## 2019-04-22 RX ORDER — EZETIMIBE 10 MG/1
TABLET ORAL
Qty: 90 TABLET | Refills: 0 | Status: SHIPPED | OUTPATIENT
Start: 2019-04-22 | End: 2019-08-20 | Stop reason: SDUPTHER

## 2019-05-13 RX ORDER — METOPROLOL SUCCINATE 50 MG/1
50 TABLET, EXTENDED RELEASE ORAL DAILY
Qty: 90 TABLET | Refills: 0 | Status: SHIPPED | OUTPATIENT
Start: 2019-05-13 | End: 2019-09-05 | Stop reason: SDUPTHER

## 2019-05-15 ENCOUNTER — TELEPHONE (OUTPATIENT)
Dept: FAMILY MEDICINE CLINIC | Facility: CLINIC | Age: 84
End: 2019-05-15

## 2019-05-15 NOTE — TELEPHONE ENCOUNTER
PT HAD PSA DONE LAST YEAR IN October, THE RESULTS WERE SENT OVER TO HIS UROLOGIST AND NEPHROLOGIST. PT WANTS TO KNOW IF IT WAS ALSO SENT TO THE [facility]?

## 2019-05-20 DIAGNOSIS — E78.2 MIXED HYPERLIPIDEMIA: ICD-10-CM

## 2019-05-20 DIAGNOSIS — I10 HYPERTENSION, ESSENTIAL: ICD-10-CM

## 2019-05-20 DIAGNOSIS — R35.0 URINE FREQUENCY: ICD-10-CM

## 2019-05-20 DIAGNOSIS — E11.9 DIABETES MELLITUS WITHOUT COMPLICATION (HCC): ICD-10-CM

## 2019-05-20 RX ORDER — TADALAFIL 20 MG
TABLET ORAL
Qty: 9 TABLET | Refills: 0 | Status: SHIPPED | OUTPATIENT
Start: 2019-05-20 | End: 2019-08-20 | Stop reason: SDUPTHER

## 2019-07-19 RX ORDER — PITAVASTATIN CALCIUM 2.09 MG/1
2 TABLET, FILM COATED ORAL NIGHTLY
Qty: 90 TABLET | Refills: 0 | Status: SHIPPED | OUTPATIENT
Start: 2019-07-19 | End: 2019-11-24 | Stop reason: SDUPTHER

## 2019-08-10 DIAGNOSIS — R35.0 URINE FREQUENCY: ICD-10-CM

## 2019-08-10 DIAGNOSIS — I10 HYPERTENSION, ESSENTIAL: ICD-10-CM

## 2019-08-10 DIAGNOSIS — E78.2 MIXED HYPERLIPIDEMIA: ICD-10-CM

## 2019-08-10 DIAGNOSIS — E11.9 DIABETES MELLITUS WITHOUT COMPLICATION (HCC): ICD-10-CM

## 2019-08-12 RX ORDER — LINAGLIPTIN 5 MG/1
TABLET, FILM COATED ORAL
Qty: 90 TABLET | Refills: 0 | Status: SHIPPED | OUTPATIENT
Start: 2019-08-12 | End: 2019-12-09 | Stop reason: SDUPTHER

## 2019-08-20 DIAGNOSIS — I10 HYPERTENSION, ESSENTIAL: ICD-10-CM

## 2019-08-20 DIAGNOSIS — R35.0 URINE FREQUENCY: ICD-10-CM

## 2019-08-20 DIAGNOSIS — E11.9 DIABETES MELLITUS WITHOUT COMPLICATION (HCC): ICD-10-CM

## 2019-08-20 DIAGNOSIS — E78.2 MIXED HYPERLIPIDEMIA: ICD-10-CM

## 2019-08-20 RX ORDER — EZETIMIBE 10 MG/1
TABLET ORAL
Qty: 90 TABLET | Refills: 0 | Status: SHIPPED | OUTPATIENT
Start: 2019-08-20 | End: 2019-12-13 | Stop reason: SDUPTHER

## 2019-08-20 RX ORDER — TADALAFIL 20 MG/1
TABLET ORAL
Qty: 9 TABLET | Refills: 0 | Status: SHIPPED | OUTPATIENT
Start: 2019-08-20 | End: 2019-12-17 | Stop reason: SDUPTHER

## 2019-09-05 RX ORDER — METOPROLOL SUCCINATE 50 MG/1
TABLET, EXTENDED RELEASE ORAL
Qty: 90 TABLET | Refills: 0 | Status: SHIPPED | OUTPATIENT
Start: 2019-09-05 | End: 2020-01-20

## 2019-09-05 RX ORDER — METOPROLOL SUCCINATE 50 MG/1
50 TABLET, EXTENDED RELEASE ORAL DAILY
Qty: 30 TABLET | Refills: 0 | Status: SHIPPED | OUTPATIENT
Start: 2019-09-05 | End: 2019-09-05 | Stop reason: SDUPTHER

## 2019-10-21 ENCOUNTER — TELEPHONE (OUTPATIENT)
Dept: FAMILY MEDICINE CLINIC | Facility: CLINIC | Age: 84
End: 2019-10-21

## 2019-10-24 ENCOUNTER — CLINICAL SUPPORT (OUTPATIENT)
Dept: FAMILY MEDICINE CLINIC | Facility: CLINIC | Age: 84
End: 2019-10-24

## 2019-10-24 PROCEDURE — G0008 ADMIN INFLUENZA VIRUS VAC: HCPCS | Performed by: INTERNAL MEDICINE

## 2019-10-24 PROCEDURE — 90653 IIV ADJUVANT VACCINE IM: CPT | Performed by: INTERNAL MEDICINE

## 2019-10-29 ENCOUNTER — OFFICE VISIT (OUTPATIENT)
Dept: FAMILY MEDICINE CLINIC | Facility: CLINIC | Age: 84
End: 2019-10-29

## 2019-10-29 VITALS
DIASTOLIC BLOOD PRESSURE: 80 MMHG | BODY MASS INDEX: 22.73 KG/M2 | OXYGEN SATURATION: 95 % | WEIGHT: 150 LBS | HEIGHT: 68 IN | TEMPERATURE: 98.4 F | HEART RATE: 79 BPM | SYSTOLIC BLOOD PRESSURE: 150 MMHG

## 2019-10-29 DIAGNOSIS — E78.5 HYPERLIPIDEMIA, UNSPECIFIED HYPERLIPIDEMIA TYPE: ICD-10-CM

## 2019-10-29 DIAGNOSIS — Z12.5 ENCOUNTER FOR PROSTATE CANCER SCREENING: ICD-10-CM

## 2019-10-29 DIAGNOSIS — E11.9 DIABETES MELLITUS WITHOUT COMPLICATION (HCC): ICD-10-CM

## 2019-10-29 DIAGNOSIS — I10 HYPERTENSION, ESSENTIAL: Primary | ICD-10-CM

## 2019-10-29 DIAGNOSIS — I10 ESSENTIAL HYPERTENSION: ICD-10-CM

## 2019-10-29 LAB
ALBUMIN SERPL-MCNC: 4.4 G/DL (ref 3.5–5.2)
ALBUMIN/GLOB SERPL: 1.6 G/DL
ALP SERPL-CCNC: 47 U/L (ref 39–117)
ALT SERPL W P-5'-P-CCNC: 17 U/L (ref 1–41)
ANION GAP SERPL CALCULATED.3IONS-SCNC: 10.2 MMOL/L (ref 5–15)
AST SERPL-CCNC: 18 U/L (ref 1–40)
BILIRUB SERPL-MCNC: 0.4 MG/DL (ref 0.2–1.2)
BUN BLD-MCNC: 15 MG/DL (ref 8–23)
BUN/CREAT SERPL: 11.6 (ref 7–25)
CALCIUM SPEC-SCNC: 9 MG/DL (ref 8.6–10.5)
CHLORIDE SERPL-SCNC: 102 MMOL/L (ref 98–107)
CHOLEST SERPL-MCNC: 146 MG/DL (ref 0–200)
CO2 SERPL-SCNC: 28.8 MMOL/L (ref 22–29)
CREAT BLD-MCNC: 1.29 MG/DL (ref 0.76–1.27)
ERYTHROCYTE [DISTWIDTH] IN BLOOD BY AUTOMATED COUNT: 12.9 % (ref 12.3–15.4)
GFR SERPL CREATININE-BSD FRML MDRD: 64 ML/MIN/1.73
GLOBULIN UR ELPH-MCNC: 2.7 GM/DL
GLUCOSE BLD-MCNC: 143 MG/DL (ref 65–99)
HBA1C MFR BLD: 6.1 % (ref 4.8–5.6)
HCT VFR BLD AUTO: 37 % (ref 37.5–51)
HDLC SERPL-MCNC: 67 MG/DL (ref 40–60)
HGB BLD-MCNC: 11.2 G/DL (ref 13–17.7)
LDLC SERPL CALC-MCNC: 67 MG/DL (ref 0–100)
LDLC/HDLC SERPL: 1 {RATIO}
LYMPHOCYTES # BLD AUTO: 1.3 10*3/MM3 (ref 0.7–3.1)
LYMPHOCYTES NFR BLD AUTO: 23.7 % (ref 19.6–45.3)
MCH RBC QN AUTO: 23.8 PG (ref 26.6–33)
MCHC RBC AUTO-ENTMCNC: 30.3 G/DL (ref 31.5–35.7)
MCV RBC AUTO: 78.5 FL (ref 79–97)
MONOCYTES # BLD AUTO: 0.4 10*3/MM3 (ref 0.1–0.9)
MONOCYTES NFR BLD AUTO: 7.9 % (ref 5–12)
NEUTROPHILS # BLD AUTO: 3.8 10*3/MM3 (ref 1.7–7)
NEUTROPHILS NFR BLD AUTO: 68.4 % (ref 42.7–76)
PLATELET # BLD AUTO: 225 10*3/MM3 (ref 140–450)
PMV BLD AUTO: 8 FL (ref 6–12)
POTASSIUM BLD-SCNC: 4.1 MMOL/L (ref 3.5–5.2)
PROT SERPL-MCNC: 7.1 G/DL (ref 6–8.5)
PSA SERPL-MCNC: 0.85 NG/ML (ref 0–4)
RBC # BLD AUTO: 4.71 10*6/MM3 (ref 4.14–5.8)
SODIUM BLD-SCNC: 141 MMOL/L (ref 136–145)
TRIGL SERPL-MCNC: 60 MG/DL (ref 0–150)
VLDLC SERPL-MCNC: 12 MG/DL (ref 5–40)
WBC NRBC COR # BLD: 5.5 10*3/MM3 (ref 3.4–10.8)

## 2019-10-29 PROCEDURE — 83036 HEMOGLOBIN GLYCOSYLATED A1C: CPT | Performed by: INTERNAL MEDICINE

## 2019-10-29 PROCEDURE — G0103 PSA SCREENING: HCPCS | Performed by: INTERNAL MEDICINE

## 2019-10-29 PROCEDURE — 80061 LIPID PANEL: CPT | Performed by: INTERNAL MEDICINE

## 2019-10-29 PROCEDURE — 99214 OFFICE O/P EST MOD 30 MIN: CPT | Performed by: INTERNAL MEDICINE

## 2019-10-29 PROCEDURE — 80053 COMPREHEN METABOLIC PANEL: CPT | Performed by: INTERNAL MEDICINE

## 2019-10-29 PROCEDURE — 85025 COMPLETE CBC W/AUTO DIFF WBC: CPT | Performed by: INTERNAL MEDICINE

## 2019-10-29 PROCEDURE — 36415 COLL VENOUS BLD VENIPUNCTURE: CPT | Performed by: INTERNAL MEDICINE

## 2019-10-29 RX ORDER — LISINOPRIL 20 MG/1
20 TABLET ORAL DAILY
Qty: 30 TABLET | Refills: 1 | Status: SHIPPED | OUTPATIENT
Start: 2019-10-29 | End: 2019-10-29 | Stop reason: SDUPTHER

## 2019-10-29 RX ORDER — LISINOPRIL 20 MG/1
20 TABLET ORAL DAILY
Qty: 90 TABLET | Refills: 1 | Status: SHIPPED | OUTPATIENT
Start: 2019-10-29 | End: 2020-06-18

## 2019-10-29 NOTE — PROGRESS NOTES
Subjective   Gera Lira is a 84 y.o. male.   Body mass index is 22.81 kg/m².  Follow-up on blood pressure lipids diabetes needs PSA done screening for PSA.  Is normal digital rectal exam done this done elsewhere he does have a personal history of prostate cancer treated with proton beam therapy  History of Present Illness   Follow-up on blood pressure lipids diabetes no recent sugar checks lately.  Patient needs prostate screening done as well with lab only does not want digital rectal exam done as referenced above personal history of prostate cancer treated with proton beam therapy  Drug allergies are none.  Patient's former smoker.  Family is positive for CAD hypertension breast cancer cerebral aneurysm diabetes.  Review of Systems    Objective   Vitals:    10/29/19 1002   BP: 150/80   Pulse: 79   Temp: 98.4 °F (36.9 °C)   SpO2: 95%   Weight: 68 kg (150 lb)     Physical Exam   Constitutional: He appears well-developed and well-nourished.   HENT:   Head: Normocephalic and atraumatic.   Eyes: Conjunctivae are normal. Pupils are equal, round, and reactive to light.   Cardiovascular: Normal rate, regular rhythm and normal heart sounds.   Pulmonary/Chest: Effort normal and breath sounds normal.   Abdominal: Soft. Bowel sounds are normal.   Neurological: He is alert.   Unremarkable gait and station   Skin: Skin is warm and dry.   Nursing note and vitals reviewed.      Lab Results   Component Value Date    INR 1.1 12/23/2014    INR 1.4 (H) 12/22/2014    INR 1.0 12/20/2014       Procedures    Assessment/Plan     1.  Hypertension suboptimally controlled increase lisinopril to 20 g daily for 10 mg daily call in blood pressure readings in 2 weeks time    2.  Encounter for prostate cancer screening await PSA    3.  Hyperlipidemia weight pending lab further recommendations to follow if good brief recheck in 6 months time    4.  Type 2 diabetes await hemoglobin A1c if satisfactory recheck in 6 months.     Much of this  encounter note is an electronic transcription/translation of spoken language to printed text.  The electronic translation of spoken language may permit erroneous, or at times, nonsensical words or phrases to be inadvertently transcribed.  Although I have reviewed the note for such errors, some may still exist. If there are questions or for further clarification, please contact me.

## 2019-10-31 DIAGNOSIS — R79.89 ABNORMAL CBC: Primary | ICD-10-CM

## 2019-10-31 RX ORDER — GLIMEPIRIDE 2 MG/1
2 TABLET ORAL
Qty: 90 TABLET | Refills: 0 | Status: SHIPPED | OUTPATIENT
Start: 2019-10-31 | End: 2020-03-13

## 2019-11-06 ENCOUNTER — TELEPHONE (OUTPATIENT)
Dept: FAMILY MEDICINE CLINIC | Facility: CLINIC | Age: 84
End: 2019-11-06

## 2019-11-06 DIAGNOSIS — I10 ESSENTIAL HYPERTENSION: Primary | ICD-10-CM

## 2019-11-06 NOTE — TELEPHONE ENCOUNTER
BP CUFF FOR HIS ARM IS TOO BIG. WILL YOU SEND IN ONE FOR HIS WRIST AT [facility] [place]?

## 2019-11-07 ENCOUNTER — TELEPHONE (OUTPATIENT)
Dept: FAMILY MEDICINE CLINIC | Facility: CLINIC | Age: 84
End: 2019-11-07

## 2019-11-25 RX ORDER — PITAVASTATIN CALCIUM 2.09 MG/1
2 TABLET, FILM COATED ORAL NIGHTLY
Qty: 90 TABLET | Refills: 0 | Status: SHIPPED | OUTPATIENT
Start: 2019-11-25 | End: 2020-04-03

## 2019-12-09 DIAGNOSIS — E11.9 DIABETES MELLITUS WITHOUT COMPLICATION (HCC): ICD-10-CM

## 2019-12-09 DIAGNOSIS — E78.2 MIXED HYPERLIPIDEMIA: ICD-10-CM

## 2019-12-09 DIAGNOSIS — R35.0 URINE FREQUENCY: ICD-10-CM

## 2019-12-09 DIAGNOSIS — I10 HYPERTENSION, ESSENTIAL: ICD-10-CM

## 2019-12-09 RX ORDER — LINAGLIPTIN 5 MG/1
TABLET, FILM COATED ORAL
Qty: 90 TABLET | Refills: 0 | Status: SHIPPED | OUTPATIENT
Start: 2019-12-09 | End: 2020-04-14

## 2019-12-13 DIAGNOSIS — E11.9 DIABETES MELLITUS WITHOUT COMPLICATION (HCC): ICD-10-CM

## 2019-12-13 DIAGNOSIS — I10 HYPERTENSION, ESSENTIAL: ICD-10-CM

## 2019-12-13 DIAGNOSIS — R35.0 URINE FREQUENCY: ICD-10-CM

## 2019-12-13 DIAGNOSIS — E78.2 MIXED HYPERLIPIDEMIA: ICD-10-CM

## 2019-12-16 RX ORDER — EZETIMIBE 10 MG/1
TABLET ORAL
Qty: 90 TABLET | Refills: 0 | Status: SHIPPED | OUTPATIENT
Start: 2019-12-16 | End: 2020-04-24

## 2019-12-17 ENCOUNTER — OFFICE VISIT (OUTPATIENT)
Dept: FAMILY MEDICINE CLINIC | Facility: CLINIC | Age: 84
End: 2019-12-17

## 2019-12-17 VITALS
OXYGEN SATURATION: 97 % | WEIGHT: 148 LBS | HEART RATE: 73 BPM | TEMPERATURE: 96 F | BODY MASS INDEX: 22.43 KG/M2 | SYSTOLIC BLOOD PRESSURE: 130 MMHG | DIASTOLIC BLOOD PRESSURE: 70 MMHG | HEIGHT: 68 IN

## 2019-12-17 DIAGNOSIS — J01.00 ACUTE MAXILLARY SINUSITIS, RECURRENCE NOT SPECIFIED: ICD-10-CM

## 2019-12-17 DIAGNOSIS — J20.9 BRONCHITIS WITH BRONCHOSPASM: Primary | ICD-10-CM

## 2019-12-17 DIAGNOSIS — R05.3 PERSISTENT COUGH FOR 3 WEEKS OR LONGER: ICD-10-CM

## 2019-12-17 DIAGNOSIS — I10 HYPERTENSION, ESSENTIAL: ICD-10-CM

## 2019-12-17 LAB
ERYTHROCYTE [DISTWIDTH] IN BLOOD BY AUTOMATED COUNT: 13.8 % (ref 12.3–15.4)
HCT VFR BLD AUTO: 39 % (ref 37.5–51)
HGB BLD-MCNC: 11.7 G/DL (ref 13–17.7)
LYMPHOCYTES # BLD AUTO: 1.5 10*3/MM3 (ref 0.7–3.1)
LYMPHOCYTES NFR BLD AUTO: 22.8 % (ref 19.6–45.3)
MCH RBC QN AUTO: 23.5 PG (ref 26.6–33)
MCHC RBC AUTO-ENTMCNC: 30.1 G/DL (ref 31.5–35.7)
MCV RBC AUTO: 78.2 FL (ref 79–97)
MONOCYTES # BLD AUTO: 0.5 10*3/MM3 (ref 0.1–0.9)
MONOCYTES NFR BLD AUTO: 7.8 % (ref 5–12)
NEUTROPHILS # BLD AUTO: 4.6 10*3/MM3 (ref 1.7–7)
NEUTROPHILS NFR BLD AUTO: 69.4 % (ref 42.7–76)
PLATELET # BLD AUTO: 264 10*3/MM3 (ref 140–450)
PMV BLD AUTO: 7.3 FL (ref 6–12)
RBC # BLD AUTO: 4.99 10*6/MM3 (ref 4.14–5.8)
RSV AG SPEC QL: NEGATIVE
WBC NRBC COR # BLD: 6.7 10*3/MM3 (ref 3.4–10.8)

## 2019-12-17 PROCEDURE — 85025 COMPLETE CBC W/AUTO DIFF WBC: CPT | Performed by: INTERNAL MEDICINE

## 2019-12-17 PROCEDURE — 36415 COLL VENOUS BLD VENIPUNCTURE: CPT | Performed by: INTERNAL MEDICINE

## 2019-12-17 PROCEDURE — 87807 RSV ASSAY W/OPTIC: CPT | Performed by: INTERNAL MEDICINE

## 2019-12-17 PROCEDURE — 71046 X-RAY EXAM CHEST 2 VIEWS: CPT | Performed by: INTERNAL MEDICINE

## 2019-12-17 PROCEDURE — 99214 OFFICE O/P EST MOD 30 MIN: CPT | Performed by: INTERNAL MEDICINE

## 2019-12-17 RX ORDER — BUDESONIDE AND FORMOTEROL FUMARATE DIHYDRATE 160; 4.5 UG/1; UG/1
AEROSOL RESPIRATORY (INHALATION)
Qty: 1 INHALER | Refills: 0 | Status: SHIPPED | OUTPATIENT
Start: 2019-12-17 | End: 2020-04-24

## 2019-12-17 RX ORDER — ALBUTEROL SULFATE 90 UG/1
AEROSOL, METERED RESPIRATORY (INHALATION)
Qty: 42.5 G | Refills: 0 | Status: SHIPPED | OUTPATIENT
Start: 2019-12-17

## 2019-12-17 RX ORDER — ALBUTEROL SULFATE 90 UG/1
2 AEROSOL, METERED RESPIRATORY (INHALATION) EVERY 4 HOURS PRN
Qty: 1 INHALER | Refills: 0 | Status: SHIPPED | OUTPATIENT
Start: 2019-12-17 | End: 2019-12-17 | Stop reason: SDUPTHER

## 2019-12-17 RX ORDER — METHYLPREDNISOLONE 4 MG/1
TABLET ORAL
Qty: 21 TABLET | Refills: 0 | Status: SHIPPED | OUTPATIENT
Start: 2019-12-17 | End: 2020-04-24

## 2019-12-17 RX ORDER — BENZONATATE 100 MG/1
CAPSULE ORAL
Qty: 30 CAPSULE | Refills: 0 | Status: SHIPPED | OUTPATIENT
Start: 2019-12-17 | End: 2020-04-24

## 2019-12-17 RX ORDER — CLINDAMYCIN HYDROCHLORIDE 300 MG/1
300 CAPSULE ORAL 3 TIMES DAILY
Qty: 30 CAPSULE | Refills: 0 | Status: SHIPPED | OUTPATIENT
Start: 2019-12-17 | End: 2019-12-27

## 2019-12-17 NOTE — PROGRESS NOTES
Subjective   Gera Lira is a 84 y.o. male.  Persistent cough respiratory complaints some sputum dating back to 10/29/2019 not much better.  Several sick for members beginning with grandchildren.  We will concern about positive RSV today as well.  Body mass index is 22.5 kg/m².  History of Present Illness   ccoughing up sputum sinice 10/29/19 visit  Some respiratory illness in family grandchildren through grandparents.  Having some cloudy sputum no increased temperature sinus drainage as well as cough frequent cough not sure if he is wheezing or not.  On physical exam today he does have diminished of his peak flows as drainage as well.  Blood pressure is satisfactory today no history of pneumonia we will get a chest x-ray for on him today as well.  No increased temperature with above symptoms body drainage sinus was as well.  Pressure maxillary sinuses blood pressure is satisfactory today  Drug allergies are none.  Former smoker.  Family history for CAD hypertension breast cancer cerebral aneurysm and diabetes  Review of Systems   Respiratory: Positive for cough.    All other systems reviewed and are negative.      Objective   Vitals:    12/17/19 1102   BP: 130/70   Pulse: 73   Temp: 96 °F (35.6 °C)   SpO2: 97%   Weight: 67.1 kg (148 lb)     Physical Exam   Constitutional: He appears well-developed and well-nourished.   HENT:   Head: Normocephalic and atraumatic.   Right Ear: External ear normal.   Left Ear: External ear normal.   Throat with minimal inflammation but no exudate.  Mild pressure maxillary sinuses.   Eyes: Pupils are equal, round, and reactive to light. Conjunctivae are normal.   Cardiovascular: Normal rate, regular rhythm and normal heart sounds.   Pulmonary/Chest: Effort normal.   Faint wheezes bilaterally slightly more on right than on left  No rales   Abdominal: Soft. Bowel sounds are normal.   Neurological: He is alert.   Unremarkable e gait and station   Skin: Skin is warm and dry.   Nursing  note and vitals reviewed.      Lab Results   Component Value Date    INR 1.1 12/23/2014    INR 1.4 (H) 12/22/2014    INR 1.0 12/20/2014       Procedures peak flows below predicted normal at 250    chest x-ray PA and peer obtained.  No comparisons available.  Do have mild increased density right lower lung field seen on the PA primarily no compar evidence of prior sternotomy wires degenerative changes seen on lateral T-spine.  No other significant abnormalities are noted.  Vick doubt active infiltrate but not actually sure will have radiology overview.  Assessment/Plan   1.  Bronchitis with bronchospasm plan Medrol Dosepak watch blood sugar probably do this see if this is successful long-acting inhaler such as Symbicort or Dulera albuterol inhaler Tessalon for cough clindamycin 300 3 times daily for 10 days time follow-up if not well in 10 days time and if symptoms worsen go the ER follow-up    2.  Acute maxillary sinusitis plan clindamycin should suffice    3.  Persistent cough 3 weeks or longer await RSV for further recommendations    4.  Hypertension controlled continue present with recheck 6 months    Much of this encounter note is an electronic transcription/translation of spoken language to printed text.  The electronic translation of spoken language may permit erroneous, or at times, nonsensical words or phrases to be inadvertently transcribed.  Although I have reviewed the note for such errors, some may still exist. If there are questions or for further clarification, please contact me.    Await RSV swab

## 2020-01-20 RX ORDER — METOPROLOL SUCCINATE 50 MG/1
TABLET, EXTENDED RELEASE ORAL
Qty: 90 TABLET | Refills: 0 | Status: SHIPPED | OUTPATIENT
Start: 2020-01-20 | End: 2020-05-21

## 2020-01-30 ENCOUNTER — TELEPHONE (OUTPATIENT)
Dept: FAMILY MEDICINE CLINIC | Facility: CLINIC | Age: 85
End: 2020-01-30

## 2020-01-30 DIAGNOSIS — R32 URINARY INCONTINENCE, UNSPECIFIED TYPE: Primary | ICD-10-CM

## 2020-01-30 NOTE — TELEPHONE ENCOUNTER
NEEDS ORDER FOR INCONTINENCE CLIP SENT TO [facility] ON [address]. PLEASE INFORM PATIENT WHEN ITS DONE

## 2020-02-26 DIAGNOSIS — E78.2 MIXED HYPERLIPIDEMIA: ICD-10-CM

## 2020-02-26 DIAGNOSIS — R35.0 URINE FREQUENCY: ICD-10-CM

## 2020-02-26 DIAGNOSIS — E11.9 DIABETES MELLITUS WITHOUT COMPLICATION (HCC): ICD-10-CM

## 2020-02-26 DIAGNOSIS — I10 HYPERTENSION, ESSENTIAL: ICD-10-CM

## 2020-02-26 RX ORDER — AMLODIPINE BESYLATE 10 MG/1
10 TABLET ORAL DAILY
Qty: 90 TABLET | Refills: 1 | Status: SHIPPED | OUTPATIENT
Start: 2020-02-26 | End: 2020-11-20

## 2020-03-09 RX ORDER — CLINDAMYCIN HYDROCHLORIDE 300 MG/1
CAPSULE ORAL
Qty: 30 CAPSULE | Refills: 0 | OUTPATIENT
Start: 2020-03-09

## 2020-03-12 DIAGNOSIS — R35.0 URINE FREQUENCY: ICD-10-CM

## 2020-03-12 DIAGNOSIS — I10 HYPERTENSION, ESSENTIAL: ICD-10-CM

## 2020-03-12 DIAGNOSIS — E11.9 DIABETES MELLITUS WITHOUT COMPLICATION (HCC): ICD-10-CM

## 2020-03-12 DIAGNOSIS — E78.2 MIXED HYPERLIPIDEMIA: ICD-10-CM

## 2020-03-13 RX ORDER — TADALAFIL 20 MG
TABLET ORAL
Qty: 9 TABLET | Refills: 5 | Status: SHIPPED | OUTPATIENT
Start: 2020-03-13 | End: 2020-11-20

## 2020-03-13 RX ORDER — GLIMEPIRIDE 2 MG/1
2 TABLET ORAL
Qty: 90 TABLET | Refills: 0 | Status: SHIPPED | OUTPATIENT
Start: 2020-03-13 | End: 2020-12-02

## 2020-04-03 RX ORDER — PITAVASTATIN CALCIUM 2.09 MG/1
2 TABLET, FILM COATED ORAL NIGHTLY
Qty: 90 TABLET | Refills: 0 | Status: SHIPPED | OUTPATIENT
Start: 2020-04-03 | End: 2020-07-13

## 2020-04-14 DIAGNOSIS — I10 HYPERTENSION, ESSENTIAL: ICD-10-CM

## 2020-04-14 DIAGNOSIS — R35.0 URINE FREQUENCY: ICD-10-CM

## 2020-04-14 DIAGNOSIS — E78.2 MIXED HYPERLIPIDEMIA: ICD-10-CM

## 2020-04-14 DIAGNOSIS — E11.9 DIABETES MELLITUS WITHOUT COMPLICATION (HCC): ICD-10-CM

## 2020-04-14 RX ORDER — LINAGLIPTIN 5 MG/1
TABLET, FILM COATED ORAL
Qty: 90 TABLET | Refills: 1 | Status: SHIPPED | OUTPATIENT
Start: 2020-04-14 | End: 2020-11-23

## 2020-04-23 ENCOUNTER — HOSPITAL ENCOUNTER (EMERGENCY)
Facility: HOSPITAL | Age: 85
Discharge: HOME | End: 2020-04-23
Attending: EMERGENCY MEDICINE | Admitting: EMERGENCY MEDICINE

## 2020-04-23 VITALS
OXYGEN SATURATION: 99 % | TEMPERATURE: 97.9 F | HEART RATE: 66 BPM | BODY MASS INDEX: 22.5 KG/M2 | DIASTOLIC BLOOD PRESSURE: 97 MMHG | HEIGHT: 68 IN | SYSTOLIC BLOOD PRESSURE: 192 MMHG

## 2020-04-23 DIAGNOSIS — I10 HYPERTENSION, ESSENTIAL: ICD-10-CM

## 2020-04-23 DIAGNOSIS — R31.0 GROSS HEMATURIA: Primary | ICD-10-CM

## 2020-04-23 DIAGNOSIS — E11.9 DIABETES MELLITUS WITHOUT COMPLICATION (HCC): ICD-10-CM

## 2020-04-23 DIAGNOSIS — E78.2 MIXED HYPERLIPIDEMIA: ICD-10-CM

## 2020-04-23 DIAGNOSIS — R35.0 URINE FREQUENCY: ICD-10-CM

## 2020-04-23 LAB
ALBUMIN SERPL-MCNC: 4.1 G/DL (ref 3.5–5.2)
ALBUMIN/GLOB SERPL: 1.3 G/DL
ALP SERPL-CCNC: 56 U/L (ref 39–117)
ALT SERPL W P-5'-P-CCNC: 12 U/L (ref 1–41)
ANION GAP SERPL CALCULATED.3IONS-SCNC: 15.6 MMOL/L (ref 5–15)
AST SERPL-CCNC: 16 U/L (ref 1–40)
BACTERIA UR QL AUTO: ABNORMAL /HPF
BASOPHILS # BLD AUTO: 0.02 10*3/MM3 (ref 0–0.2)
BASOPHILS NFR BLD AUTO: 0.3 % (ref 0–1.5)
BILIRUB SERPL-MCNC: 0.2 MG/DL (ref 0.2–1.2)
BILIRUB UR QL STRIP: NEGATIVE
BUN BLD-MCNC: 20 MG/DL (ref 8–23)
BUN/CREAT SERPL: 15.4 (ref 7–25)
CALCIUM SPEC-SCNC: 9.1 MG/DL (ref 8.6–10.5)
CHLORIDE SERPL-SCNC: 100 MMOL/L (ref 98–107)
CLARITY UR: CLEAR
CO2 SERPL-SCNC: 25.4 MMOL/L (ref 22–29)
COLOR UR: ABNORMAL
CREAT BLD-MCNC: 1.3 MG/DL (ref 0.76–1.27)
DEPRECATED RDW RBC AUTO: 35.8 FL (ref 37–54)
EOSINOPHIL # BLD AUTO: 0.12 10*3/MM3 (ref 0–0.4)
EOSINOPHIL NFR BLD AUTO: 2 % (ref 0.3–6.2)
ERYTHROCYTE [DISTWIDTH] IN BLOOD BY AUTOMATED COUNT: 12.6 % (ref 12.3–15.4)
GFR SERPL CREATININE-BSD FRML MDRD: 64 ML/MIN/1.73
GLOBULIN UR ELPH-MCNC: 3.2 GM/DL
GLUCOSE BLD-MCNC: 123 MG/DL (ref 65–99)
GLUCOSE UR STRIP-MCNC: NEGATIVE MG/DL
HCT VFR BLD AUTO: 35.6 % (ref 37.5–51)
HGB BLD-MCNC: 10.9 G/DL (ref 13–17.7)
HGB UR QL STRIP.AUTO: ABNORMAL
HYALINE CASTS UR QL AUTO: ABNORMAL /LPF
IMM GRANULOCYTES # BLD AUTO: 0.02 10*3/MM3 (ref 0–0.05)
IMM GRANULOCYTES NFR BLD AUTO: 0.3 % (ref 0–0.5)
KETONES UR QL STRIP: NEGATIVE
LEUKOCYTE ESTERASE UR QL STRIP.AUTO: ABNORMAL
LYMPHOCYTES # BLD AUTO: 1.5 10*3/MM3 (ref 0.7–3.1)
LYMPHOCYTES NFR BLD AUTO: 24.7 % (ref 19.6–45.3)
MCH RBC QN AUTO: 24.3 PG (ref 26.6–33)
MCHC RBC AUTO-ENTMCNC: 30.6 G/DL (ref 31.5–35.7)
MCV RBC AUTO: 79.5 FL (ref 79–97)
MONOCYTES # BLD AUTO: 0.69 10*3/MM3 (ref 0.1–0.9)
MONOCYTES NFR BLD AUTO: 11.4 % (ref 5–12)
NEUTROPHILS # BLD AUTO: 3.72 10*3/MM3 (ref 1.7–7)
NEUTROPHILS NFR BLD AUTO: 61.3 % (ref 42.7–76)
NITRITE UR QL STRIP: NEGATIVE
NRBC BLD AUTO-RTO: 0 /100 WBC (ref 0–0.2)
PH UR STRIP.AUTO: 7.5 [PH] (ref 5–8)
PLATELET # BLD AUTO: 213 10*3/MM3 (ref 140–450)
PMV BLD AUTO: 10.8 FL (ref 6–12)
POTASSIUM BLD-SCNC: 3.5 MMOL/L (ref 3.5–5.2)
PROT SERPL-MCNC: 7.3 G/DL (ref 6–8.5)
PROT UR QL STRIP: ABNORMAL
RBC # BLD AUTO: 4.48 10*6/MM3 (ref 4.14–5.8)
RBC # UR: ABNORMAL /HPF
REF LAB TEST METHOD: ABNORMAL
SODIUM BLD-SCNC: 141 MMOL/L (ref 136–145)
SP GR UR STRIP: <=1.005 (ref 1–1.03)
SQUAMOUS #/AREA URNS HPF: ABNORMAL /HPF
UROBILINOGEN UR QL STRIP: ABNORMAL
WBC NRBC COR # BLD: 6.07 10*3/MM3 (ref 3.4–10.8)
WBC UR QL AUTO: ABNORMAL /HPF

## 2020-04-23 PROCEDURE — 99283 EMERGENCY DEPT VISIT LOW MDM: CPT

## 2020-04-23 PROCEDURE — 85025 COMPLETE CBC W/AUTO DIFF WBC: CPT | Performed by: EMERGENCY MEDICINE

## 2020-04-23 PROCEDURE — 81001 URINALYSIS AUTO W/SCOPE: CPT | Performed by: EMERGENCY MEDICINE

## 2020-04-23 PROCEDURE — 80053 COMPREHEN METABOLIC PANEL: CPT | Performed by: EMERGENCY MEDICINE

## 2020-04-23 RX ORDER — NITROFURANTOIN 25; 75 MG/1; MG/1
100 CAPSULE ORAL 2 TIMES DAILY
Qty: 14 CAPSULE | Refills: 0 | Status: SHIPPED | OUTPATIENT
Start: 2020-04-23 | End: 2020-04-24 | Stop reason: ALTCHOICE

## 2020-04-23 NOTE — ED TRIAGE NOTES
"Pt arrived at triage desk from parking lot with c/o hematuria without pain on urination that started yesterday.  Pt states that he is \"peeing bright red blood.\"  Mask in place on pt d/t possible hx of cough. Pt is VERY hard of hearing.  "

## 2020-04-23 NOTE — ED PROVIDER NOTES
EMERGENCY DEPARTMENT ENCOUNTER    CHIEF COMPLAINT  Chief Complaint: hematuria  History given by: pt  History limited by: none  Room Number: 11/11  PMD: Luis Burrows Jr., MD      HPI:  Pt is a 85 y.o. male who presents complaining of blood in his urine that occurred suddenly approximately 1 hour prior to arrival to the emergency department.  The patient states that he awoke and had to use the restroom when he noticed the blood in his urine.  He states that there have been a few clots associated with this blood in the urine as well.  The patient denies dysuria as well as difficulty with urination.  The patient denies abdominal pain or back pain.  The patient denies fevers and chills, cough, headache, dizziness.  The patient states that this has never occurred previously and he is not currently on blood thinners other than a single aspirin daily.    PAST MEDICAL HISTORY  Active Ambulatory Problems     Diagnosis Date Noted   • Right carotid bruit 02/07/2017   • Coronary artery disease    • Diabetes mellitus (CMS/HCC)    • Dyslipidemia    • Essential hypertension    • Stenosis of right carotid artery 02/27/2017     Resolved Ambulatory Problems     Diagnosis Date Noted   • No Resolved Ambulatory Problems     Past Medical History:   Diagnosis Date   • Acute inferior myocardial infarction (CMS/HCC)    • Angina, class III (CMS/HCC)    • Cardiomyopathy (CMS/HCC)    • Health care maintenance    • History of penile cancer    • Hyperinsulinism    • Hyperlipidemia    • Hypertension    • MI (myocardial infarction) (CMS/HCC)    • Prostate cancer (CMS/HCC)    • S/P CABG (coronary artery bypass graft)        PAST SURGICAL HISTORY  Past Surgical History:   Procedure Laterality Date   • BACK SURGERY     • CARDIAC CATHETERIZATION     • COLONOSCOPY     • CORONARY ARTERY BYPASS GRAFT     • OTHER SURGICAL HISTORY      CARDIAC CATH PROCEDURE SUMMARY   • OTHER SURGICAL HISTORY      RADIATION THERAPY       FAMILY HISTORY  Family History    Problem Relation Age of Onset   • Coronary artery disease Father    • Hypertension Father    • Breast cancer Sister    • Cerebral aneurysm Sister    • Diabetes Sister    • No Known Problems Mother        SOCIAL HISTORY  Social History     Socioeconomic History   • Marital status:      Spouse name: Not on file   • Number of children: Not on file   • Years of education: Not on file   • Highest education level: Not on file   Tobacco Use   • Smoking status: Former Smoker   • Smokeless tobacco: Never Used   Substance and Sexual Activity   • Alcohol use: No   • Drug use: No   • Sexual activity: Defer       ALLERGIES  Patient has no known allergies.    REVIEW OF SYSTEMS  Review of Systems   Constitutional: Negative for activity change, appetite change and fever.   HENT: Negative for congestion and sore throat.    Eyes: Negative.    Respiratory: Negative for cough and shortness of breath.    Cardiovascular: Negative for chest pain and leg swelling.   Gastrointestinal: Negative for abdominal pain, diarrhea and vomiting.   Endocrine: Negative.    Genitourinary: Positive for hematuria. Negative for decreased urine volume and dysuria.   Musculoskeletal: Negative for neck pain.   Skin: Negative for rash and wound.   Allergic/Immunologic: Negative.    Neurological: Negative for weakness, numbness and headaches.   Hematological: Negative.    Psychiatric/Behavioral: Negative.    All other systems reviewed and are negative.      PHYSICAL EXAM  ED Triage Vitals [04/23/20 0106]   Temp Heart Rate Resp BP SpO2   97.9 °F (36.6 °C) 91 -- -- 98 %      Temp src Heart Rate Source Patient Position BP Location FiO2 (%)   Oral -- -- -- --       Physical Exam   Constitutional: He is oriented to person, place, and time. No distress.   HENT:   Head: Normocephalic and atraumatic.   Eyes: Pupils are equal, round, and reactive to light. EOM are normal.   Neck: Normal range of motion. Neck supple.   Cardiovascular: Normal rate, regular rhythm  and normal heart sounds.   Pulmonary/Chest: Effort normal and breath sounds normal. No respiratory distress.   Abdominal: Soft. There is no tenderness. There is no rebound and no guarding.   Musculoskeletal: Normal range of motion. He exhibits no edema.   Neurological: He is alert and oriented to person, place, and time. He has normal sensation and normal strength.   Skin: Skin is warm and dry.   Psychiatric: Mood and affect normal.   Nursing note and vitals reviewed.      LAB RESULTS  Lab Results (last 24 hours)     Procedure Component Value Units Date/Time    Urinalysis With Microscopic If Indicated (No Culture) - Urine, Clean Catch [[id number]]  (Abnormal) Collected:  04/23/20 0127    Specimen:  Urine, Clean Catch Updated:  04/23/20 0155     Color, UA Red     Comment: Any Substance that causes an abnormal urine color can alter the accuracy of the chemical reactions.        Appearance, UA Clear     pH, UA 7.5     Specific Gravity, UA <=1.005     Glucose, UA Negative     Ketones, UA Negative     Bilirubin, UA Negative     Blood, UA Large (3+)     Protein,  mg/dL (2+)     Leuk Esterase, UA Trace     Nitrite, UA Negative     Urobilinogen, UA 0.2 E.U./dL    Urinalysis, Microscopic Only - Urine, Clean Catch [[id number]]  (Abnormal) Collected:  04/23/20 0127    Specimen:  Urine, Clean Catch Updated:  04/23/20 0155     RBC, UA Too Numerous to Count /HPF      WBC, UA 3-5 /HPF      Bacteria, UA None Seen /HPF      Squamous Epithelial Cells, UA 0-2 /HPF      Hyaline Casts, UA None Seen /LPF      Methodology Automated Microscopy    CBC & Differential [[id number]] Collected:  04/23/20 0135    Specimen:  Blood Updated:  04/23/20 0152    Narrative:       The following orders were created for panel order CBC & Differential.  Procedure                               Abnormality         Status                     ---------                               -----------         ------                     CBC Auto  Differential[[id number]]        Abnormal            Final result                 Please view results for these tests on the individual orders.    Comprehensive Metabolic Panel [[id number]]  (Abnormal) Collected:  04/23/20 0135    Specimen:  Blood Updated:  04/23/20 0301     Glucose 123 mg/dL      BUN 20 mg/dL      Creatinine 1.30 mg/dL      Sodium 141 mmol/L      Potassium 3.5 mmol/L      Chloride 100 mmol/L      CO2 25.4 mmol/L      Calcium 9.1 mg/dL      Total Protein 7.3 g/dL      Albumin 4.10 g/dL      ALT (SGPT) 12 U/L      AST (SGOT) 16 U/L      Alkaline Phosphatase 56 U/L      Total Bilirubin 0.2 mg/dL      eGFR  African Amer 64 mL/min/1.73      Globulin 3.2 gm/dL      A/G Ratio 1.3 g/dL      BUN/Creatinine Ratio 15.4     Anion Gap 15.6 mmol/L     Narrative:       GFR Normal >60  Chronic Kidney Disease <60  Kidney Failure <15      CBC Auto Differential [[id number]]  (Abnormal) Collected:  04/23/20 0135    Specimen:  Blood Updated:  04/23/20 0152     WBC 6.07 10*3/mm3      RBC 4.48 10*6/mm3      Hemoglobin 10.9 g/dL      Hematocrit 35.6 %      MCV 79.5 fL      MCH 24.3 pg      MCHC 30.6 g/dL      RDW 12.6 %      RDW-SD 35.8 fl      MPV 10.8 fL      Platelets 213 10*3/mm3      Neutrophil % 61.3 %      Lymphocyte % 24.7 %      Monocyte % 11.4 %      Eosinophil % 2.0 %      Basophil % 0.3 %      Immature Grans % 0.3 %      Neutrophils, Absolute 3.72 10*3/mm3      Lymphocytes, Absolute 1.50 10*3/mm3      Monocytes, Absolute 0.69 10*3/mm3      Eosinophils, Absolute 0.12 10*3/mm3      Basophils, Absolute 0.02 10*3/mm3      Immature Grans, Absolute 0.02 10*3/mm3      nRBC 0.0 /100 WBC           I ordered the above labs and reviewed the results    RADIOLOGY  No orders to display        I ordered the above noted radiological studies. Interpreted by radiologist.  Reviewed by me in PACS.       PROCEDURES  Procedures      PROGRESS AND CONSULTS     The patient was wearing a facemask upon entrance into the room and remained in  such throughout their visit.  I was wearing PPE including a facemask as well as gloves at any point entering the room and throughout the visit    0300  Upon reevaluation the patient, he continues to state that he is not having any difficulty with urination and is going often.  The urine continues to be a dark brown to red color but without obvious clots seen currently.  The patient has been instructed that he can go home but if he develops inability to urinate or difficulty with urination despite the sensation to go, that he is to return to the emergency department immediately.  Otherwise we will start him on antibiotics and have him follow-up with urology for a possible cystoscopy as an outpatient soon.  All questions addressed and patient stable for discharge      MEDICAL DECISION MAKING  Results were reviewed/discussed with the patient and they were also made aware of online access. Pt also made aware that some labs, such as cultures, will not be resulted during ER visit and follow up with PMD is necessary.     MDM  Number of Diagnoses or Management Options     Amount and/or Complexity of Data Reviewed  Clinical lab tests: ordered and reviewed  Review and summarize past medical records: yes (Patient last seen in the emergency department 12/2018 secondary to an upper respiratory infection)           DIAGNOSIS  Final diagnoses:   Gross hematuria       DISPOSITION  DISCHARGE    Patient discharged in stable condition.    Reviewed implications of results, diagnosis, meds, responsibility to follow up, warning signs and symptoms of possible worsening, potential complications and reasons to return to ER    Patient/Family voiced understanding of above instructions.    Discussed plan for discharge, as there is no emergent indication for admission. Patient referred to primary care provider for BP management due to today's BP. Pt/family is agreeable and understands need for follow up and repeat testing.  Pt is aware that  discharge does not mean that nothing is wrong but it indicates no emergency is present that requires admission and they must continue care with follow-up as given below or physician of their choice.     FOLLOW-UP  Luis Burrows Jr., MD  [address] [facility] [address]  [phone]    Schedule an appointment as soon as possible for a visit       [facility]  [address] [facility] [address]  [phone]  Schedule an appointment as soon as possible for a visit            Medication List      New Prescriptions    nitrofurantoin (macrocrystal-monohydrate) 100 MG capsule  Commonly known as:  MACROBID  Take 1 capsule by mouth 2 (Two) Times a Day.            Latest Documented Vital Signs:  As of 03:12  BP- (!) 184/96 HR- 91 Temp- 97.9 °F (36.6 °C) (Oral) O2 sat- 98%           Javier Decker MD  04/23/20 9644

## 2020-04-23 NOTE — DISCHARGE INSTRUCTIONS
Return to the emergency department with difficulty or inability to urinate, fevers or chills, or any changes or worsening symptoms  Follow-up with urology as soon as possible for further testing

## 2020-04-23 NOTE — ED NOTES
"Pt here with blood in urine that started around an hour before coming to ER. Pt states\" that he had not had this happen before, that he went to the bathroom and there was a lot of blood and clots but the blood has gotten better. Urine is about the color of pink lemonade with 2-3 clots smaller in  Size.      Miryam Valencia, RN  04/23/20 0141    "

## 2020-04-24 ENCOUNTER — OFFICE VISIT (OUTPATIENT)
Dept: FAMILY MEDICINE CLINIC | Facility: CLINIC | Age: 85
End: 2020-04-24

## 2020-04-24 VITALS
HEIGHT: 68 IN | TEMPERATURE: 98 F | SYSTOLIC BLOOD PRESSURE: 150 MMHG | OXYGEN SATURATION: 99 % | WEIGHT: 146 LBS | HEART RATE: 68 BPM | RESPIRATION RATE: 18 BRPM | DIASTOLIC BLOOD PRESSURE: 70 MMHG | BODY MASS INDEX: 22.13 KG/M2

## 2020-04-24 DIAGNOSIS — D64.9 ANEMIA, UNSPECIFIED TYPE: ICD-10-CM

## 2020-04-24 DIAGNOSIS — Z85.46 PERSONAL HISTORY OF PROSTATE CANCER: ICD-10-CM

## 2020-04-24 DIAGNOSIS — C61 PROSTATE CANCER (HCC): ICD-10-CM

## 2020-04-24 DIAGNOSIS — E78.5 HYPERLIPIDEMIA, UNSPECIFIED HYPERLIPIDEMIA TYPE: ICD-10-CM

## 2020-04-24 DIAGNOSIS — R79.89 CREATININE ELEVATION: ICD-10-CM

## 2020-04-24 DIAGNOSIS — E11.9 DIABETES MELLITUS WITHOUT COMPLICATION (HCC): ICD-10-CM

## 2020-04-24 DIAGNOSIS — R31.9 HEMATURIA, UNSPECIFIED TYPE: Primary | ICD-10-CM

## 2020-04-24 LAB
ALBUMIN SERPL-MCNC: 4 G/DL (ref 3.5–5.2)
ALBUMIN/GLOB SERPL: 1.3 G/DL
ALP SERPL-CCNC: 51 U/L (ref 39–117)
ALT SERPL W P-5'-P-CCNC: 8 U/L (ref 1–41)
ANION GAP SERPL CALCULATED.3IONS-SCNC: 12 MMOL/L (ref 5–15)
AST SERPL-CCNC: 7 U/L (ref 1–40)
BACTERIA UR QL AUTO: ABNORMAL /HPF
BILIRUB SERPL-MCNC: 0.2 MG/DL (ref 0.2–1.2)
BILIRUB UR QL STRIP: NEGATIVE
BUN BLD-MCNC: 15 MG/DL (ref 8–23)
BUN/CREAT SERPL: 11.5 (ref 7–25)
CALCIUM SPEC-SCNC: 9.2 MG/DL (ref 8.6–10.5)
CHLORIDE SERPL-SCNC: 101 MMOL/L (ref 98–107)
CHOLEST SERPL-MCNC: 153 MG/DL (ref 0–200)
CLARITY UR: CLEAR
CO2 SERPL-SCNC: 28 MMOL/L (ref 22–29)
COLOR UR: YELLOW
CREAT BLD-MCNC: 1.3 MG/DL (ref 0.76–1.27)
ERYTHROCYTE [DISTWIDTH] IN BLOOD BY AUTOMATED COUNT: 12.8 % (ref 12.3–15.4)
GFR SERPL CREATININE-BSD FRML MDRD: 64 ML/MIN/1.73
GLOBULIN UR ELPH-MCNC: 3.2 GM/DL
GLUCOSE BLD-MCNC: 199 MG/DL (ref 65–99)
GLUCOSE UR STRIP-MCNC: NEGATIVE MG/DL
HBA1C MFR BLD: 5.9 % (ref 4.8–5.6)
HCT VFR BLD AUTO: 37.6 % (ref 37.5–51)
HDLC SERPL-MCNC: 67 MG/DL (ref 40–60)
HGB BLD-MCNC: 11.5 G/DL (ref 13–17.7)
HGB UR QL STRIP.AUTO: ABNORMAL
KETONES UR QL STRIP: NEGATIVE
LDLC SERPL CALC-MCNC: 68 MG/DL (ref 0–100)
LDLC/HDLC SERPL: 1.01 {RATIO}
LEUKOCYTE ESTERASE UR QL STRIP.AUTO: NEGATIVE
LYMPHOCYTES # BLD AUTO: 1.5 10*3/MM3 (ref 0.7–3.1)
LYMPHOCYTES NFR BLD AUTO: 22.2 % (ref 19.6–45.3)
MCH RBC QN AUTO: 23.7 PG (ref 26.6–33)
MCHC RBC AUTO-ENTMCNC: 30.5 G/DL (ref 31.5–35.7)
MCV RBC AUTO: 77.9 FL (ref 79–97)
MONOCYTES # BLD AUTO: 0.5 10*3/MM3 (ref 0.1–0.9)
MONOCYTES NFR BLD AUTO: 7.7 % (ref 5–12)
NEUTROPHILS # BLD AUTO: 4.6 10*3/MM3 (ref 1.7–7)
NEUTROPHILS NFR BLD AUTO: 70.1 % (ref 42.7–76)
NITRITE UR QL STRIP: NEGATIVE
PH UR STRIP.AUTO: 5.5 [PH] (ref 4.6–8)
PLATELET # BLD AUTO: 229 10*3/MM3 (ref 140–450)
PMV BLD AUTO: 7.4 FL (ref 6–12)
POTASSIUM BLD-SCNC: 4.3 MMOL/L (ref 3.5–5.2)
PROT SERPL-MCNC: 7.2 G/DL (ref 6–8.5)
PROT UR QL STRIP: ABNORMAL
PSA SERPL-MCNC: 0.9 NG/ML (ref 0–4)
RBC # BLD AUTO: 4.83 10*6/MM3 (ref 4.14–5.8)
RBC # UR: ABNORMAL /HPF
REF LAB TEST METHOD: ABNORMAL
SODIUM BLD-SCNC: 141 MMOL/L (ref 136–145)
SP GR UR STRIP: 1.02 (ref 1–1.03)
SPERM URNS QL MICRO: ABNORMAL
SQUAMOUS #/AREA URNS HPF: ABNORMAL /HPF
TRIGL SERPL-MCNC: 91 MG/DL (ref 0–150)
UROBILINOGEN UR QL STRIP: ABNORMAL
VLDLC SERPL-MCNC: 18.2 MG/DL (ref 5–40)
WBC NRBC COR # BLD: 6.6 10*3/MM3 (ref 3.4–10.8)
WBC UR QL AUTO: ABNORMAL /HPF

## 2020-04-24 PROCEDURE — 36415 COLL VENOUS BLD VENIPUNCTURE: CPT | Performed by: INTERNAL MEDICINE

## 2020-04-24 PROCEDURE — 80053 COMPREHEN METABOLIC PANEL: CPT | Performed by: INTERNAL MEDICINE

## 2020-04-24 PROCEDURE — 99214 OFFICE O/P EST MOD 30 MIN: CPT | Performed by: INTERNAL MEDICINE

## 2020-04-24 PROCEDURE — 83036 HEMOGLOBIN GLYCOSYLATED A1C: CPT | Performed by: INTERNAL MEDICINE

## 2020-04-24 PROCEDURE — 81001 URINALYSIS AUTO W/SCOPE: CPT | Performed by: INTERNAL MEDICINE

## 2020-04-24 PROCEDURE — 84153 ASSAY OF PSA TOTAL: CPT | Performed by: INTERNAL MEDICINE

## 2020-04-24 PROCEDURE — 85025 COMPLETE CBC W/AUTO DIFF WBC: CPT | Performed by: INTERNAL MEDICINE

## 2020-04-24 PROCEDURE — 80061 LIPID PANEL: CPT | Performed by: INTERNAL MEDICINE

## 2020-04-24 RX ORDER — EZETIMIBE 10 MG/1
TABLET ORAL
Qty: 90 TABLET | Refills: 0 | Status: SHIPPED | OUTPATIENT
Start: 2020-04-24 | End: 2020-07-13

## 2020-04-24 RX ORDER — CIPROFLOXACIN 250 MG/1
250 TABLET, FILM COATED ORAL 2 TIMES DAILY
Qty: 20 TABLET | Refills: 0 | Status: SHIPPED | OUTPATIENT
Start: 2020-04-24 | End: 2020-05-04

## 2020-04-24 NOTE — PROGRESS NOTES
Subjective   Gera Lira is a 85 y.o. male.   Body mass index is 22.2 kg/m².  Recent onset of hematuria seen in ER few days ago put on Macrobid some clearing of urine no pain with this just simply pain blood  History of Present Illness   Blood pressures elevated today patient is on his blood pressure medicine generally runs about 130 systolic when he is taking his medicine.  We will have patient continue to monitor blood and urine has improved since being seen in ER couple days ago and being put on Macrobid.  Will check both anemia with hemoglobin 10.9 from ER as well as elevated creatinine 1.3.  Patient's former smoker is not restarted no history of hematuria in the past is not aware of a cystoscope done but did have some for prostate biopsy procedure done for his prostate cancer several years ago had proton beam treatment was not aware of any bladder irritation from imaging at that point in time no prior history for bladder issues.  No flank pain no personal history of a kidney stone we will get updated urine today with patient have elevated creatinine one-point creatinine discontinued his Macrobid and switch him to Cipro patient also needs lipids rechecked blood pressure is good at home as referenced he is not his blood pressure medicine yet is generally in the 130s over 70s at home when he takes his medicine has not checked his blood sugars recently was when last checked were satisfactory so get updated when A1c he is lipids rechecked also has forthcoming visit with his cancer specialist for prostate cancer like PSA obtained prior to visit.  Family history positive for CAD hypertension breast cancer cerebral aneurysm and diabetes former smoker.  Drug allergies are none  Review of Systems   Genitourinary: Positive for hematuria.   All other systems reviewed and are negative.      Objective   Vitals:    04/24/20 0951   BP: 150/70   Pulse: 68   Resp: 18   Temp: 98 °F (36.7 °C)   SpO2: 99%   Weight: 66.2 kg (146  lb)     Physical Exam   Constitutional: He appears well-developed and well-nourished.   HENT:   Head: Normocephalic and atraumatic.   Eyes: Pupils are equal, round, and reactive to light. Conjunctivae are normal.   Cardiovascular: Normal rate, regular rhythm and normal heart sounds.   Pulmonary/Chest: Effort normal and breath sounds normal.   Abdominal: Soft. Bowel sounds are normal.   Negative CVA tenderness   Neurological: He is alert.   Unremarkable gait and station   Skin: Skin is warm and dry.   Nursing note and vitals reviewed.      Lab Results   Component Value Date    INR 1.1 12/23/2014    INR 1.4 (H) 12/22/2014    INR 1.0 12/20/2014       Procedures    Assessment/Plan   1.  Hematuria improved from last we will switch patient Cipro 250 twice daily for 10 days time will get follow-up UA after that get urology consult as well    2.  Anemia get updated values    3.  Creatinine elevation get updated values and hold Macrobid    4.  Type 2 diabetes update lab values if satisfactory check in 6 months    5.  Hypertension controlled by history continue present medicine    6.  Hyperlipidemia await pending lab if good will recheck in 6 months    7.  Personal history of prostate cancer sees his cancer specialist next week we will get updated PSA last was in satisfactory range patient was treated with proton beam therapy for this in the past    Much of this encounter note is an electronic transcription/translation of spoken language to printed text.  The electronic translation of spoken language may permit erroneous, or at times, nonsensical words or phrases to be inadvertently transcribed.  Although I have reviewed the note for such errors, some may still exist. If there are questions or for further clarification, please contact me.

## 2020-05-12 ENCOUNTER — TELEPHONE (OUTPATIENT)
Dept: FAMILY MEDICINE CLINIC | Facility: CLINIC | Age: 85
End: 2020-05-12

## 2020-05-12 NOTE — TELEPHONE ENCOUNTER
Patients wife called with the fax number for Dr. Terrell Obrien with the [facility]. The need the blood work faxed over.  Please return call (fax) and advise.      Fax #: [phone]

## 2020-05-21 RX ORDER — METOPROLOL SUCCINATE 50 MG/1
TABLET, EXTENDED RELEASE ORAL
Qty: 90 TABLET | Refills: 1 | Status: SHIPPED | OUTPATIENT
Start: 2020-05-21 | End: 2021-01-12

## 2020-06-18 DIAGNOSIS — I10 ESSENTIAL HYPERTENSION: ICD-10-CM

## 2020-06-18 RX ORDER — LISINOPRIL 20 MG/1
20 TABLET ORAL DAILY
Qty: 90 TABLET | Refills: 1 | Status: SHIPPED | OUTPATIENT
Start: 2020-06-18 | End: 2020-12-07

## 2020-07-13 DIAGNOSIS — E11.9 DIABETES MELLITUS WITHOUT COMPLICATION (HCC): ICD-10-CM

## 2020-07-13 DIAGNOSIS — R35.0 URINE FREQUENCY: ICD-10-CM

## 2020-07-13 DIAGNOSIS — I10 HYPERTENSION, ESSENTIAL: ICD-10-CM

## 2020-07-13 DIAGNOSIS — E78.2 MIXED HYPERLIPIDEMIA: ICD-10-CM

## 2020-07-13 RX ORDER — EZETIMIBE 10 MG/1
TABLET ORAL
Qty: 90 TABLET | Refills: 0 | Status: SHIPPED | OUTPATIENT
Start: 2020-07-13 | End: 2021-01-22 | Stop reason: SDUPTHER

## 2020-07-13 RX ORDER — PITAVASTATIN CALCIUM 2.09 MG/1
2 TABLET, FILM COATED ORAL NIGHTLY
Qty: 90 TABLET | Refills: 0 | Status: SHIPPED | OUTPATIENT
Start: 2020-07-13 | End: 2020-12-16

## 2020-11-20 DIAGNOSIS — I10 HYPERTENSION, ESSENTIAL: ICD-10-CM

## 2020-11-20 DIAGNOSIS — R35.0 URINE FREQUENCY: ICD-10-CM

## 2020-11-20 DIAGNOSIS — E78.2 MIXED HYPERLIPIDEMIA: ICD-10-CM

## 2020-11-20 DIAGNOSIS — E11.9 DIABETES MELLITUS WITHOUT COMPLICATION (HCC): ICD-10-CM

## 2020-11-20 RX ORDER — TADALAFIL 20 MG/1
TABLET ORAL
Qty: 9 TABLET | Refills: 5 | Status: SHIPPED | OUTPATIENT
Start: 2020-11-20 | End: 2020-12-07

## 2020-11-20 RX ORDER — AMLODIPINE BESYLATE 10 MG/1
10 TABLET ORAL DAILY
Qty: 90 TABLET | Refills: 1 | Status: SHIPPED | OUTPATIENT
Start: 2020-11-20 | End: 2021-03-30 | Stop reason: SDUPTHER

## 2020-11-23 DIAGNOSIS — E78.2 MIXED HYPERLIPIDEMIA: ICD-10-CM

## 2020-11-23 DIAGNOSIS — I10 HYPERTENSION, ESSENTIAL: ICD-10-CM

## 2020-11-23 DIAGNOSIS — R35.0 URINE FREQUENCY: ICD-10-CM

## 2020-11-23 DIAGNOSIS — E11.9 DIABETES MELLITUS WITHOUT COMPLICATION (HCC): ICD-10-CM

## 2020-11-23 RX ORDER — LINAGLIPTIN 5 MG/1
TABLET, FILM COATED ORAL
Qty: 90 TABLET | Refills: 1 | Status: SHIPPED | OUTPATIENT
Start: 2020-11-23 | End: 2021-03-30 | Stop reason: SDUPTHER

## 2020-11-24 ENCOUNTER — OFFICE VISIT (OUTPATIENT)
Dept: FAMILY MEDICINE CLINIC | Facility: CLINIC | Age: 85
End: 2020-11-24

## 2020-11-24 VITALS
BODY MASS INDEX: 22.13 KG/M2 | DIASTOLIC BLOOD PRESSURE: 72 MMHG | HEIGHT: 68 IN | OXYGEN SATURATION: 98 % | TEMPERATURE: 96.9 F | HEART RATE: 73 BPM | WEIGHT: 146 LBS | SYSTOLIC BLOOD PRESSURE: 158 MMHG

## 2020-11-24 DIAGNOSIS — I10 HYPERTENSION, ESSENTIAL: ICD-10-CM

## 2020-11-24 DIAGNOSIS — E78.5 ELEVATED LIPIDS: ICD-10-CM

## 2020-11-24 DIAGNOSIS — D64.9 ANEMIA, UNSPECIFIED TYPE: ICD-10-CM

## 2020-11-24 DIAGNOSIS — K62.5 RECTAL BLEEDING: Primary | ICD-10-CM

## 2020-11-24 DIAGNOSIS — E11.9 DIABETES MELLITUS WITHOUT COMPLICATION (HCC): ICD-10-CM

## 2020-11-24 LAB
ALBUMIN SERPL-MCNC: 4.2 G/DL (ref 3.5–5.2)
ALBUMIN UR-MCNC: 100 MG/L (ref 0–20)
ALBUMIN/GLOB SERPL: 1.6 G/DL
ALP SERPL-CCNC: 60 U/L (ref 39–117)
ALT SERPL W P-5'-P-CCNC: 11 U/L (ref 1–41)
ANION GAP SERPL CALCULATED.3IONS-SCNC: 10.3 MMOL/L (ref 5–15)
AST SERPL-CCNC: 14 U/L (ref 1–40)
BILIRUB SERPL-MCNC: 0.4 MG/DL (ref 0–1.2)
BUN SERPL-MCNC: 16 MG/DL (ref 8–23)
BUN/CREAT SERPL: 11.3 (ref 7–25)
CALCIUM SPEC-SCNC: 9.1 MG/DL (ref 8.6–10.5)
CHLORIDE SERPL-SCNC: 102 MMOL/L (ref 98–107)
CHOLEST SERPL-MCNC: 159 MG/DL (ref 0–200)
CO2 SERPL-SCNC: 28.7 MMOL/L (ref 22–29)
CREAT SERPL-MCNC: 1.42 MG/DL (ref 0.76–1.27)
ERYTHROCYTE [DISTWIDTH] IN BLOOD BY AUTOMATED COUNT: 13.4 % (ref 12.3–15.4)
GFR SERPL CREATININE-BSD FRML MDRD: 57 ML/MIN/1.73
GLOBULIN UR ELPH-MCNC: 2.6 GM/DL
GLUCOSE SERPL-MCNC: 157 MG/DL (ref 65–99)
HBA1C MFR BLD: 7.71 % (ref 4.8–5.6)
HCT VFR BLD AUTO: 34.7 % (ref 37.5–51)
HDLC SERPL-MCNC: 74 MG/DL (ref 40–60)
HGB BLD-MCNC: 10.8 G/DL (ref 13–17.7)
LDLC SERPL CALC-MCNC: 69 MG/DL (ref 0–100)
LDLC/HDLC SERPL: 0.92 {RATIO}
LYMPHOCYTES # BLD AUTO: 1.4 10*3/MM3 (ref 0.7–3.1)
LYMPHOCYTES NFR BLD AUTO: 18.9 % (ref 19.6–45.3)
MCH RBC QN AUTO: 24 PG (ref 26.6–33)
MCHC RBC AUTO-ENTMCNC: 31 G/DL (ref 31.5–35.7)
MCV RBC AUTO: 77.4 FL (ref 79–97)
MONOCYTES # BLD AUTO: 0.4 10*3/MM3 (ref 0.1–0.9)
MONOCYTES NFR BLD AUTO: 5.5 % (ref 5–12)
NEUTROPHILS NFR BLD AUTO: 5.7 10*3/MM3 (ref 1.7–7)
NEUTROPHILS NFR BLD AUTO: 75.6 % (ref 42.7–76)
PLATELET # BLD AUTO: 260 10*3/MM3 (ref 140–450)
PMV BLD AUTO: 8.9 FL (ref 6–12)
POTASSIUM SERPL-SCNC: 4.2 MMOL/L (ref 3.5–5.2)
PROT SERPL-MCNC: 6.8 G/DL (ref 6–8.5)
RBC # BLD AUTO: 4.49 10*6/MM3 (ref 4.14–5.8)
SODIUM SERPL-SCNC: 141 MMOL/L (ref 136–145)
TRIGL SERPL-MCNC: 86 MG/DL (ref 0–150)
VLDLC SERPL-MCNC: 16 MG/DL (ref 5–40)
WBC # BLD AUTO: 7.5 10*3/MM3 (ref 3.4–10.8)

## 2020-11-24 PROCEDURE — 80061 LIPID PANEL: CPT | Performed by: INTERNAL MEDICINE

## 2020-11-24 PROCEDURE — 83036 HEMOGLOBIN GLYCOSYLATED A1C: CPT | Performed by: INTERNAL MEDICINE

## 2020-11-24 PROCEDURE — 99214 OFFICE O/P EST MOD 30 MIN: CPT | Performed by: INTERNAL MEDICINE

## 2020-11-24 PROCEDURE — 80053 COMPREHEN METABOLIC PANEL: CPT | Performed by: INTERNAL MEDICINE

## 2020-11-24 PROCEDURE — 82043 UR ALBUMIN QUANTITATIVE: CPT | Performed by: INTERNAL MEDICINE

## 2020-11-24 PROCEDURE — 36415 COLL VENOUS BLD VENIPUNCTURE: CPT | Performed by: INTERNAL MEDICINE

## 2020-11-24 PROCEDURE — 85025 COMPLETE CBC W/AUTO DIFF WBC: CPT | Performed by: INTERNAL MEDICINE

## 2020-11-24 RX ORDER — INFLUENZA A VIRUS A/MICHIGAN/45/2015 X-275 (H1N1) ANTIGEN (FORMALDEHYDE INACTIVATED), INFLUENZA A VIRUS A/SINGAPORE/INFIMH-16-0019/2016 IVR-186 (H3N2) ANTIGEN (FORMALDEHYDE INACTIVATED), INFLUENZA B VIRUS B/PHUKET/3073/2013 ANTIGEN (FORMALDEHYDE INACTIVATED), AND INFLUENZA B VIRUS B/MARYLAND/15/2016 BX-69A ANTIGEN (FORMALDEHYDE INACTIVATED) 60; 60; 60; 60 UG/.7ML; UG/.7ML; UG/.7ML; UG/.7ML
INJECTION, SUSPENSION INTRAMUSCULAR
Status: ON HOLD | COMMUNITY
Start: 2020-10-20 | End: 2020-12-29

## 2020-11-24 NOTE — PROGRESS NOTES
The ABCs of the Annual Wellness Visit  Subsequent Medicare Wellness Visit    Chief Complaint   Patient presents with   • blood stool f/u   • Medicare Wellness-subsequent       Subjective   History of Present Illness:  Gera Lira is a 85 y.o. male who presents for a Subsequent Medicare Wellness Visit.    HEALTH RISK ASSESSMENT    Recent Hospitalizations:  No hospitalization(s) within the last year.    Current Medical Providers:  Patient Care Team:  Luis Burrows Jr., MD as PCP - General    Smoking Status:  Social History     Tobacco Use   Smoking Status Former Smoker   Smokeless Tobacco Never Used       Alcohol Consumption:  Social History     Substance and Sexual Activity   Alcohol Use No       Depression Screen:   PHQ-2/PHQ-9 Depression Screening 11/24/2020   Little interest or pleasure in doing things 0   Feeling down, depressed, or hopeless 0   Trouble falling or staying asleep, or sleeping too much 0   Feeling tired or having little energy 0   Poor appetite or overeating 0   Feeling bad about yourself - or that you are a failure or have let yourself or your family down 0   Trouble concentrating on things, such as reading the newspaper or watching television 0   Moving or speaking so slowly that other people could have noticed. Or the opposite - being so fidgety or restless that you have been moving around a lot more than usual 0   Thoughts that you would be better off dead, or of hurting yourself in some way 0   Total Score 0   If you checked off any problems, how difficult have these problems made it for you to do your work, take care of things at home, or get along with other people? Not difficult at all       Fall Risk Screen:  APARNAADI Fall Risk Assessment has not been completed.    Health Habits and Functional and Cognitive Screening:  Functional & Cognitive Status 11/24/2020   Do you have difficulty preparing food and eating? No   Do you have difficulty bathing yourself, getting dressed or grooming  yourself? No   Do you have difficulty using the toilet? No   Do you have difficulty moving around from place to place? No   Do you have trouble with steps or getting out of a bed or a chair? No   Current Diet Well Balanced Diet   Dental Exam Up to date   Eye Exam Up to date   Exercise (times per week) 0 times per week   Current Exercises Include No Regular Exercise   Do you need help using the phone?  No   Are you deaf or do you have serious difficulty hearing?  Yes   Do you need help with transportation? No   Do you need help shopping? No   Do you need help preparing meals?  No   Do you need help with housework?  No   Do you need help with laundry? No   Do you need help taking your medications? No   Do you need help managing money? No   Do you ever drive or ride in a car without wearing a seat belt? No   Have you felt unusual stress, anger or loneliness in the last month? No   Who do you live with? Spouse   If you need help, do you have trouble finding someone available to you? No   Have you been bothered in the last four weeks by sexual problems? No   Do you have difficulty concentrating, remembering or making decisions? No         Does the patient have evidence of cognitive impairment? No    Asprin use counseling:Does not need ASA (and currently is not on it)    Age-appropriate Screening Schedule:  Refer to the list below for future screening recommendations based on patient's age, sex and/or medical conditions. Orders for these recommended tests are listed in the plan section. The patient has been provided with a written plan.    Health Maintenance   Topic Date Due   • TDAP/TD VACCINES (1 - Tdap) 04/09/1954   • ZOSTER VACCINE (1 of 2) 04/09/1985   • DIABETIC EYE EXAM  03/07/2016   • URINE MICROALBUMIN  10/15/2019   • INFLUENZA VACCINE  08/01/2020   • HEMOGLOBIN A1C  10/24/2020   • LIPID PANEL  04/24/2021          The following portions of the patient's history were reviewed and updated as appropriate: past  family history, past medical history, past social history, past surgical history and problem list.    Outpatient Medications Prior to Visit   Medication Sig Dispense Refill   • ALBUTEROL SULFATE  (90 Base) MCG/ACT inhaler INHALE 2 PUFFS BY MOUTH EVERY 4 HOURS AS NEEDED 42.5 g 0   • amLODIPine (NORVASC) 10 MG tablet TAKE 1 TABLET BY MOUTH DAILY 90 tablet 1   • Coenzyme Q10 (CO Q 10) 100 MG capsule Take  by mouth.     • ezetimibe (ZETIA) 10 MG tablet TAKE 1 TABLET BY MOUTH EVERY DAY 90 tablet 0   • glimepiride (AMARYL) 2 MG tablet TAKE 1 TABLET BY MOUTH EVERY MORNING BEFORE BREAKFAST 90 tablet 0   • glucose blood test strip Test once daily 30 each 12   • guaifenesin-dextromethorphan (MUCINEX DM)  MG tablet sustained-release 12 hour tablet Take 1 tablet by mouth Every 12 (Twelve) Hours. 1 each 0   • lisinopril (PRINIVIL,ZESTRIL) 20 MG tablet TAKE 1 TABLET BY MOUTH DAILY (Patient taking differently: Take 10 mg by mouth Daily.) 90 tablet 1   • LIVALO 2 MG tablet tablet TAKE 1 TABLET BY MOUTH EVERY NIGHT 90 tablet 0   • metoprolol succinate XL (TOPROL-XL) 50 MG 24 hr tablet TAKE 1 TABLET BY MOUTH EVERY DAY 90 tablet 1   • Omega-3 Fatty Acids (FISH OIL) 1200 MG capsule capsule Take by mouth.     • tadalafil (CIALIS) 20 MG tablet TAKE 1 TABLET BY MOUTH EVERY DAY AS DIRECTED 9 tablet 5   • Tradjenta 5 MG tablet tablet TAKE 1 TABLET BY MOUTH DAILY 90 tablet 1   • aspirin 81 MG tablet Take 1 tablet by mouth daily.     • Fluzone High-Dose Quadrivalent 0.7 ML suspension prefilled syringe ADMINISTER 0.7ML IN THE MUSCLE AS DIRECTED     • hydrochlorothiazide (HYDRODIURIL) 25 MG tablet Take 1 tablet by mouth Daily. 30 tablet 11     No facility-administered medications prior to visit.        Patient Active Problem List   Diagnosis   • Right carotid bruit   • Coronary artery disease   • Diabetes mellitus (CMS/HCC)   • Dyslipidemia   • Essential hypertension   • Stenosis of right carotid artery       Advanced Care  "Planning:  ACP discussion was held with the patient during this visit. Patient does not have an advance directive, declines further assistance.    Review of Systems    Compared to one year ago, the patient feels his physical health is worse.  Compared to one year ago, the patient feels his mental health is the same.    Reviewed chart for potential of high risk medication in the elderly: yes  Reviewed chart for potential of harmful drug interactions in the elderly:yes    Objective         Vitals:    11/24/20 1127   BP: 158/72   Pulse: 73   Temp: 96.9 °F (36.1 °C)   SpO2: 98%   Weight: 66.2 kg (146 lb)   Height: 172.7 cm (68\")       Body mass index is 22.2 kg/m².  Discussed the patient's BMI with him. The BMI is in the acceptable range.    Physical Exam          Assessment/Plan   Medicare Risks and Personalized Health Plan  CMS Preventative Services Quick Reference  Advance Directive Discussion  Dementia/Memory   Depression/Dysphoria  Fall Risk  Hearing Problem    The above risks/problems have been discussed with the patient.  Pertinent information has been shared with the patient in the After Visit Summary.  Follow up plans and orders are seen below in the Assessment/Plan Section.    There are no diagnoses linked to this encounter.  Follow Up:  No follow-ups on file.     An After Visit Summary and PPPS were given to the patient.             "

## 2020-11-24 NOTE — PROGRESS NOTES
Subjective   Gera Lira is a 85 y.o. male.   Body mass index is 22.2 kg/m².  History of Present Illness   Rectal bleeding from bowel movement 2 days ago he has some dribbling after 4 a day he has had a bowel movement this morning without blood in the stool did see his radiation oncologist yesterday who did not come with any gross blood without his normal brown stool in the setting of him having getting radiation last about 2 months ago we will need to pursue this further history of anemia as well get updated CBC of note blood pressure is elevated today as well    Review of Systems   Constitutional: Negative.    HENT: Negative.    Eyes: Negative.    Respiratory: Negative.    Cardiovascular: Negative.    Gastrointestinal: Negative.    Endocrine: Negative.    Genitourinary: Negative.    Musculoskeletal: Negative.    Skin: Negative.    Allergic/Immunologic: Negative.    Neurological: Negative.    Hematological: Negative.    Psychiatric/Behavioral: Negative.        Objective   Vitals:    11/24/20 1127   BP: 158/72   Pulse: 73   Temp: 96.9 °F (36.1 °C)   SpO2: 98%   Weight: 66.2 kg (146 lb)     Physical Exam  Vitals signs and nursing note reviewed.   Constitutional:       Appearance: Normal appearance.   HENT:      Head: Normocephalic and atraumatic.   Eyes:      Conjunctiva/sclera: Conjunctivae normal.      Pupils: Pupils are equal, round, and reactive to light.   Cardiovascular:      Rate and Rhythm: Normal rate and regular rhythm.      Heart sounds: Normal heart sounds.   Pulmonary:      Effort: Pulmonary effort is normal.      Breath sounds: Normal breath sounds.   Abdominal:      General: Abdomen is flat. Bowel sounds are normal.      Palpations: Abdomen is soft.      Comments: Digital rectal exam deferred as he had one yesterday with his radiation oncologist which likely did not show any gross blood on glove.   Skin:     General: Skin is warm and dry.   Neurological:      General: No focal deficit present.       Mental Status: He is alert.      Comments: Somewhat slow but unremarkable gait and station         Lab Results   Component Value Date    INR 1.1 12/23/2014    INR 1.4 (H) 12/22/2014    INR 1.0 12/20/2014       Procedures    Assessment/Plan   1.  Rectal bleeding plan refer to general surgery Dr. Cat [facility] for evaluation if bleeding should become heavy go to ER also await CBC from today    2.  Anemia get updated CBC    3.  Hypertension controlled by history continue present medicine recheck in 3-6 months    4.  Type 2 diabetes await lab work if good recheck 6 months    5.  Hyperlipidemia get updated values with follow-up in 6 months if satisfactory.    Much of this encounter note is an electronic transcription/translation of spoken language to printed text.  The electronic translation of spoken language may permit erroneous, or at times, nonsensical words or phrases to be inadvertently transcribed.  Although I have reviewed the note for such errors, some may still exist. If there are questions or for further clarification, please contact me.  There are no diagnoses linked to this encounter.

## 2020-12-02 RX ORDER — GLIMEPIRIDE 2 MG/1
2 TABLET ORAL
Qty: 90 TABLET | Refills: 0 | Status: SHIPPED | OUTPATIENT
Start: 2020-12-02 | End: 2021-04-09 | Stop reason: SDUPTHER

## 2020-12-07 ENCOUNTER — OFFICE VISIT (OUTPATIENT)
Dept: SURGERY | Facility: CLINIC | Age: 85
End: 2020-12-07

## 2020-12-07 VITALS — HEIGHT: 68 IN | BODY MASS INDEX: 22.1 KG/M2 | WEIGHT: 145.8 LBS

## 2020-12-07 DIAGNOSIS — K62.5 RECTAL BLEEDING: Primary | ICD-10-CM

## 2020-12-07 PROCEDURE — 99203 OFFICE O/P NEW LOW 30 MIN: CPT | Performed by: SURGERY

## 2020-12-07 NOTE — PROGRESS NOTES
Cc: Rectal bleeding, anemia    History of presenting illness:   This is a very nice 85-year-old gentleman with a history of coronary artery disease, cardiomyopathy, diabetes and prostate cancer who says that about 2 weeks ago he had a fairly large bloody bowel movements which was painless.  His description of it is little bit unclear, but it does not sound like it was bright red rectal bleeding.  Since then he has intermittently had a small amount of blood, predominantly on the toilet paper.  He also has multiple days which were combined with normal bowel movements without any blood.  He has had an associated weight loss of around 20 pounds over the last year or so, although he attributes most of that to difficulties with his teeth and dentures.  He denies any abdominal pain.  No change in appetite.  He has been undergoing prostate radiation for his prostate cancer and completed this around a month ago.    Past Medical History: Coronary artery disease, myocardial infarction, cardiomyopathy, hypertension, type 2 diabetes, prostate cancer    Past Surgical History: Patient reports his last colonoscopy was around 7 years ago he believes, and he thinks it was normal.  He cannot recall exactly where it was performed.  He underwent coronary artery bypass grafting around 10 years ago and prior to that had cardiac stenting.    Medications: Reviewed and reconciled in epic.  Aspirin is listed but he has not taken this for several months.  No other anticoagulants.    Allergies: None known    Social History: He is a former smoker who quit in the 1970s, uses alcohol occasionally, lives independently.    Family History: Negative for known colon and rectal cancer    Review of Systems:  Constitutional: Positive for unintentional weight loss of around 20 pounds, negative for fever, chills  Neck: no swollen glands or dysphagia or odynophagia  Respiratory: negative for SOB, cough, hemoptysis or wheezing  Cardiovascular: negative for  chest pain, palpitations or peripheral edema  Gastrointestinal: Negative for nausea, vomiting, abdominal pain, positive for rectal bleeding as described above      Physical Exam:   Body mass index 22.2  General: alert and oriented, appropriate, no acute distress  Neck: Supple without lymphadenopathy or thyromegaly, trachea is in the midline  Respiratory: Lungs are clear bilaterally without wheezing, no use of accessory muscles is noted  Cardiovascular: Regular rate and rhythm without murmur, no peripheral edema  Gastrointestinal: Soft and benign, no mass, no hernia  Rectal: Digital rectal exam is negative.  There is no blood on the glove.  There is no mass and no obvious hemorrhoids are seen    Laboratory data: Recent hemoglobin is 10.8 with an MCV of 77.4.  Hemoglobin A1c slightly elevated at about 7.1.    Imaging data: No recent relevant data      Assessment and plan:   -Rectal bleeding  -Anemia  -History of prostate cancer, status post radiation treatment  -Options are discussed with the patient.  I have recommended proceeding with colonoscopy.  Last scope was done around 7 years ago, described as normal by patient.  We are efferent to find those records, but he is not certain where it was done.      Darek Adams MD, FACS  General, Minimally Invasive and Endoscopic Surgery  [facility]    [address], [address]  [place], [place], [address]  P: [phone]  F: [phone]

## 2020-12-10 ENCOUNTER — TRANSCRIBE ORDERS (OUTPATIENT)
Dept: SLEEP MEDICINE | Facility: HOSPITAL | Age: 85
End: 2020-12-10

## 2020-12-10 DIAGNOSIS — Z01.818 OTHER SPECIFIED PRE-OPERATIVE EXAMINATION: Primary | ICD-10-CM

## 2020-12-16 RX ORDER — PITAVASTATIN CALCIUM 2.09 MG/1
2 TABLET, FILM COATED ORAL NIGHTLY
Qty: 90 TABLET | Refills: 0 | Status: SHIPPED | OUTPATIENT
Start: 2020-12-16 | End: 2021-03-30 | Stop reason: SDUPTHER

## 2020-12-26 ENCOUNTER — LAB (OUTPATIENT)
Dept: LAB | Facility: HOSPITAL | Age: 85
End: 2020-12-26

## 2020-12-26 DIAGNOSIS — Z01.818 OTHER SPECIFIED PRE-OPERATIVE EXAMINATION: ICD-10-CM

## 2020-12-26 PROCEDURE — C9803 HOPD COVID-19 SPEC COLLECT: HCPCS

## 2020-12-26 PROCEDURE — U0004 COV-19 TEST NON-CDC HGH THRU: HCPCS

## 2020-12-28 LAB — SARS-COV-2 RNA RESP QL NAA+PROBE: NOT DETECTED

## 2020-12-29 ENCOUNTER — ANESTHESIA EVENT (OUTPATIENT)
Dept: GASTROENTEROLOGY | Facility: HOSPITAL | Age: 85
End: 2020-12-29

## 2020-12-29 ENCOUNTER — HOSPITAL ENCOUNTER (OUTPATIENT)
Facility: HOSPITAL | Age: 85
Setting detail: HOSPITAL OUTPATIENT SURGERY
Discharge: HOME | End: 2020-12-29
Attending: SURGERY | Admitting: SURGERY

## 2020-12-29 ENCOUNTER — ANESTHESIA (OUTPATIENT)
Dept: GASTROENTEROLOGY | Facility: HOSPITAL | Age: 85
End: 2020-12-29

## 2020-12-29 VITALS
OXYGEN SATURATION: 98 % | TEMPERATURE: 97.6 F | HEIGHT: 68 IN | HEART RATE: 64 BPM | BODY MASS INDEX: 21.61 KG/M2 | SYSTOLIC BLOOD PRESSURE: 138 MMHG | DIASTOLIC BLOOD PRESSURE: 80 MMHG | RESPIRATION RATE: 16 BRPM | WEIGHT: 142.6 LBS

## 2020-12-29 DIAGNOSIS — K62.5 RECTAL BLEEDING: ICD-10-CM

## 2020-12-29 LAB — GLUCOSE BLDC GLUCOMTR-MCNC: 150 MG/DL (ref 70–130)

## 2020-12-29 PROCEDURE — 25010000002 PROPOFOL 10 MG/ML EMULSION: Performed by: ANESTHESIOLOGY

## 2020-12-29 PROCEDURE — 45380 COLONOSCOPY AND BIOPSY: CPT | Performed by: SURGERY

## 2020-12-29 PROCEDURE — 45381 COLONOSCOPY SUBMUCOUS NJX: CPT | Performed by: SURGERY

## 2020-12-29 PROCEDURE — 82962 GLUCOSE BLOOD TEST: CPT

## 2020-12-29 PROCEDURE — 88305 TISSUE EXAM BY PATHOLOGIST: CPT | Performed by: SURGERY

## 2020-12-29 PROCEDURE — 45385 COLONOSCOPY W/LESION REMOVAL: CPT | Performed by: SURGERY

## 2020-12-29 RX ORDER — EPHEDRINE SULFATE 50 MG/ML
INJECTION, SOLUTION INTRAVENOUS AS NEEDED
Status: DISCONTINUED | OUTPATIENT
Start: 2020-12-29 | End: 2020-12-29 | Stop reason: SURG

## 2020-12-29 RX ORDER — SODIUM CHLORIDE 0.9 % (FLUSH) 0.9 %
10 SYRINGE (ML) INJECTION AS NEEDED
Status: DISCONTINUED | OUTPATIENT
Start: 2020-12-29 | End: 2020-12-29 | Stop reason: HOSPADM

## 2020-12-29 RX ORDER — SODIUM CHLORIDE, SODIUM LACTATE, POTASSIUM CHLORIDE, CALCIUM CHLORIDE 600; 310; 30; 20 MG/100ML; MG/100ML; MG/100ML; MG/100ML
1000 INJECTION, SOLUTION INTRAVENOUS CONTINUOUS
Status: DISCONTINUED | OUTPATIENT
Start: 2020-12-29 | End: 2020-12-29 | Stop reason: HOSPADM

## 2020-12-29 RX ORDER — LIDOCAINE HYDROCHLORIDE 20 MG/ML
INJECTION, SOLUTION INFILTRATION; PERINEURAL AS NEEDED
Status: DISCONTINUED | OUTPATIENT
Start: 2020-12-29 | End: 2020-12-29 | Stop reason: SURG

## 2020-12-29 RX ORDER — PROPOFOL 10 MG/ML
VIAL (ML) INTRAVENOUS AS NEEDED
Status: DISCONTINUED | OUTPATIENT
Start: 2020-12-29 | End: 2020-12-29 | Stop reason: SURG

## 2020-12-29 RX ORDER — LIDOCAINE HYDROCHLORIDE 10 MG/ML
0.5 INJECTION, SOLUTION INFILTRATION; PERINEURAL ONCE AS NEEDED
Status: DISCONTINUED | OUTPATIENT
Start: 2020-12-29 | End: 2020-12-29 | Stop reason: HOSPADM

## 2020-12-29 RX ORDER — PROPOFOL 10 MG/ML
VIAL (ML) INTRAVENOUS CONTINUOUS PRN
Status: DISCONTINUED | OUTPATIENT
Start: 2020-12-29 | End: 2020-12-29 | Stop reason: SURG

## 2020-12-29 RX ADMIN — EPHEDRINE SULFATE 5 MG: 50 INJECTION INTRAVENOUS at 15:07

## 2020-12-29 RX ADMIN — SODIUM CHLORIDE, POTASSIUM CHLORIDE, SODIUM LACTATE AND CALCIUM CHLORIDE 1000 ML: 600; 310; 30; 20 INJECTION, SOLUTION INTRAVENOUS at 14:37

## 2020-12-29 RX ADMIN — PROPOFOL 50 MG: 10 INJECTION, EMULSION INTRAVENOUS at 14:41

## 2020-12-29 RX ADMIN — PROPOFOL 100 MCG/KG/MIN: 10 INJECTION, EMULSION INTRAVENOUS at 14:43

## 2020-12-29 RX ADMIN — LIDOCAINE HYDROCHLORIDE 50 MG: 20 INJECTION, SOLUTION INFILTRATION; PERINEURAL at 14:41

## 2020-12-29 NOTE — ANESTHESIA PREPROCEDURE EVALUATION
Anesthesia Evaluation     Patient summary reviewed and Nursing notes reviewed                Airway   Mallampati: I  TM distance: >3 FB  Neck ROM: full  No difficulty expected  Dental - normal exam     Pulmonary - normal exam   (+) a smoker Former,   Cardiovascular - normal exam    (+) hypertension, past MI , CAD, CABG, angina, hyperlipidemia,  carotid artery disease      Neuro/Psych- negative ROS  GI/Hepatic/Renal/Endo    (+)  GI bleeding , diabetes mellitus,     Musculoskeletal (-) negative ROS    Abdominal  - normal exam    Bowel sounds: normal.   Substance History - negative use     OB/GYN negative ob/gyn ROS         Other      history of cancer                    Anesthesia Plan    ASA 3     MAC       Anesthetic plan, all risks, benefits, and alternatives have been provided, discussed and informed consent has been obtained with: patient.

## 2020-12-31 LAB
CYTO UR: NORMAL
LAB AP CASE REPORT: NORMAL
PATH REPORT.FINAL DX SPEC: NORMAL
PATH REPORT.GROSS SPEC: NORMAL

## 2021-01-04 ENCOUNTER — OFFICE VISIT (OUTPATIENT)
Dept: SURGERY | Facility: CLINIC | Age: 86
End: 2021-01-04

## 2021-01-04 DIAGNOSIS — K63.89 COLONIC MASS: Primary | ICD-10-CM

## 2021-01-04 PROCEDURE — 99214 OFFICE O/P EST MOD 30 MIN: CPT | Performed by: SURGERY

## 2021-01-04 NOTE — PROGRESS NOTES
Cc:  Distal sigmoid colon mass     History of presenting illness:   This is a very nice 85-year-old gentleman with a history of coronary artery disease, cardiomyopathy, diabetes and prostate cancer who says that about 2 weeks ago he had a fairly large bloody bowel movements which was painless.  His description of it is little bit unclear, but it does not sound like it was bright red rectal bleeding.  Since then he has intermittently had a small amount of blood, predominantly on the toilet paper.  He also has multiple days which were combined with normal bowel movements without any blood.  He has had an associated weight loss of around 20 pounds over the last year or so, although he attributes most of that to difficulties with his teeth and dentures.  He denies any abdominal pain.  No change in appetite.  He has been undergoing prostate radiation for his prostate cancer and completed this around a month ago.    He underwent colonoscopy recently with finding of a mass in the distal sigmoid, endoscopically unresectable and biopsies have shown tubulovillous adenoma.     Past Medical History: Coronary artery disease, myocardial infarction, cardiomyopathy, hypertension, type 2 diabetes, prostate cancer     Past Surgical History: Patient reports his last colonoscopy was around 7 years ago he believes, and he thinks it was normal.  He cannot recall exactly where it was performed.  He underwent coronary artery bypass grafting around 10 years ago and prior to that had cardiac stenting.     Medications: Reviewed and reconciled in epic.  Aspirin is listed but he has not taken this for several months.  No other anticoagulants.     Allergies: None known     Social History: He is a former smoker who quit in the 1970s, uses alcohol occasionally, lives independently.     Family History: Negative for known colon and rectal cancer     Review of Systems:  Constitutional: Positive for unintentional weight loss of around 20 pounds,  negative for fever, chills  Neck: no swollen glands or dysphagia or odynophagia  Respiratory: negative for SOB, cough, hemoptysis or wheezing  Cardiovascular: negative for chest pain, palpitations or peripheral edema  Gastrointestinal: Negative for nausea, vomiting, abdominal pain, positive for rectal bleeding as described above        Physical Exam:   Body mass index 21.7  General: alert and oriented, appropriate, no acute distress  Neck: Supple without lymphadenopathy or thyromegaly, trachea is in the midline  Respiratory: Lungs are clear bilaterally without wheezing, no use of accessory muscles is noted  Cardiovascular: Regular rate and rhythm without murmur, no peripheral edema  Gastrointestinal: Soft and benign, no mass, no hernia  Rectal: Digital rectal exam is negative.  There is no blood on the glove.  There is no mass and no obvious hemorrhoids are seen     Laboratory data: Recent hemoglobin is 10.8 with an MCV of 77.4.  Hemoglobin A1c slightly elevated at about 7.1.  Pathology of distal sigmoid mass demonstrates tubulovillous adenoma.     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  I have advised him that I recommend sigmoid colon resection.  I have suggested he obtain cardiac evaluation before this.  The risks associated with the procedure are noted to include, but not be limited to, bleeding, infection, injury to intra-abdominal structures, injury to the ureter bladder, anastomotic leak, fistula, etc.  The patient is going to give some consideration to this and discuss further with his family.     Darek Adams MD, FACS  General, Minimally Invasive and Endoscopic Surgery  [address]  [place], [place], [address]  P: [phone]  F: [phone]

## 2021-01-12 RX ORDER — METOPROLOL SUCCINATE 50 MG/1
TABLET, EXTENDED RELEASE ORAL
Qty: 90 TABLET | Refills: 1 | Status: SHIPPED | OUTPATIENT
Start: 2021-01-12 | End: 2021-03-30 | Stop reason: SDUPTHER

## 2021-01-20 ENCOUNTER — OFFICE VISIT (OUTPATIENT)
Dept: CARDIOLOGY | Facility: CLINIC | Age: 86
End: 2021-01-20

## 2021-01-20 VITALS
HEART RATE: 89 BPM | OXYGEN SATURATION: 99 % | SYSTOLIC BLOOD PRESSURE: 160 MMHG | BODY MASS INDEX: 21.95 KG/M2 | DIASTOLIC BLOOD PRESSURE: 90 MMHG | HEIGHT: 68 IN | WEIGHT: 144.8 LBS

## 2021-01-20 DIAGNOSIS — E11.59 TYPE 2 DIABETES MELLITUS WITH OTHER CIRCULATORY COMPLICATION, WITHOUT LONG-TERM CURRENT USE OF INSULIN (HCC): ICD-10-CM

## 2021-01-20 DIAGNOSIS — E78.5 DYSLIPIDEMIA: ICD-10-CM

## 2021-01-20 DIAGNOSIS — I25.10 CORONARY ARTERY DISEASE INVOLVING NATIVE CORONARY ARTERY OF NATIVE HEART WITHOUT ANGINA PECTORIS: Primary | ICD-10-CM

## 2021-01-20 DIAGNOSIS — I10 ESSENTIAL HYPERTENSION: ICD-10-CM

## 2021-01-20 DIAGNOSIS — N18.30 STAGE 3 CHRONIC KIDNEY DISEASE, UNSPECIFIED WHETHER STAGE 3A OR 3B CKD (HCC): ICD-10-CM

## 2021-01-20 PROCEDURE — 93000 ELECTROCARDIOGRAM COMPLETE: CPT | Performed by: INTERNAL MEDICINE

## 2021-01-20 PROCEDURE — 99214 OFFICE O/P EST MOD 30 MIN: CPT | Performed by: INTERNAL MEDICINE

## 2021-01-20 RX ORDER — LISINOPRIL 20 MG/1
20 TABLET ORAL DAILY
Qty: 90 TABLET | Refills: 2 | Status: SHIPPED | OUTPATIENT
Start: 2021-01-20 | End: 2021-01-22 | Stop reason: SDUPTHER

## 2021-01-20 RX ORDER — LISINOPRIL 10 MG/1
10 TABLET ORAL DAILY
COMMUNITY
End: 2021-01-20

## 2021-01-20 RX ORDER — LISINOPRIL 10 MG/1
20 TABLET ORAL DAILY
Status: SHIPPED
Start: 2021-01-20 | End: 2021-01-20 | Stop reason: SDUPTHER

## 2021-01-20 NOTE — PROGRESS NOTES
Date of Office Visit: 2021    Patient Name: Gera Lira  : 1935    Encounter Provider: Raj Vidales MD  Referring Provider: Darek Adams MD  Place of Service: [facility] CARDIOLOGY  Patient Care Team:  Luis Burrows Jr., MD as PCP - General      Chief Complaint   Patient presents with   • Coronary Artery Disease   • Hypertension   • Pre-op Exam     History of Present Illness    The patient is an 85-year-old black man with a history of coronary artery disease dating back to at least .  Ultimately in  the patient underwent bypass surgery.  He has remained symptom-free with respect to angina pectoris or heart failure since that time.  He has not been noted to have left ventricular dysfunction.    The patient is here for preoperative evaluation.  He recently was noted to have gastrointestinal bleeding that ultimately led to a colonoscopy.  He was found to have a large adenoma that was not able to be removed.  It has been recommended he undergo a surgical procedure with a partial colectomy.    The patient as noted above is asymptomatic with respect to any cardiac problems.  He has not had any hospitalizations in the last 6 to 12 months with any cardiac concerns.  The patient has been treated for prostate cancer over the past year or so.  In addition he has hypertension that is inadequately controlled and diabetes mellitus for which she has been on oral therapy.  The patient's last hemoglobin A1c was almost 8.  The patient is being treated for hyperlipidemia as well.    The patient denies any angina pectoris, exertional dyspnea, peripheral edema, orthopnea nor paroxysmal nocturnal dyspnea.    Past Medical History:   Diagnosis Date   • Acute inferior myocardial infarction (CMS/HCC)    • Angina, class III (CMS/HCC)    • Carcinoma (CMS/HCC)    • Cardiomyopathy (CMS/HCC)    • Coronary artery disease    • Diabetes mellitus (CMS/HCC)    •  Dyslipidemia    • Essential hypertension    • Health care maintenance    • History of penile cancer    • Hyperinsulinism    • Hyperlipidemia    • Hypertension    • MI (myocardial infarction) (CMS/HCC)    • Prostate cancer (CMS/HCC)    • S/P CABG (coronary artery bypass graft)          Past Surgical History:   Procedure Laterality Date   • BACK SURGERY     • CARDIAC CATHETERIZATION     • COLONOSCOPY     • COLONOSCOPY N/A 12/29/2020    Procedure: COLONOSCOPY to cecum with cold polypectomies and hot snare polypectomy with tattoo;  Surgeon: Darek Adams MD;  Location: [facility] ENDOSCOPY;  Service: General;  Laterality: N/A;  pre - rectal bleeding  post - polyps, distal sigmoid mass   • CORONARY ARTERY BYPASS GRAFT     • OTHER SURGICAL HISTORY      CARDIAC CATH PROCEDURE SUMMARY   • OTHER SURGICAL HISTORY      RADIATION THERAPY           Current Outpatient Medications:   •  ALBUTEROL SULFATE  (90 Base) MCG/ACT inhaler, INHALE 2 PUFFS BY MOUTH EVERY 4 HOURS AS NEEDED, Disp: 42.5 g, Rfl: 0  •  amLODIPine (NORVASC) 10 MG tablet, TAKE 1 TABLET BY MOUTH DAILY, Disp: 90 tablet, Rfl: 1  •  aspirin 81 MG tablet, Take 1 tablet by mouth daily., Disp: , Rfl:   •  ezetimibe (ZETIA) 10 MG tablet, TAKE 1 TABLET BY MOUTH EVERY DAY, Disp: 90 tablet, Rfl: 0  •  glimepiride (AMARYL) 2 MG tablet, TAKE 1 TABLET BY MOUTH EVERY MORNING BEFORE BREAKFAST, Disp: 90 tablet, Rfl: 0  •  glucose blood test strip, Test once daily, Disp: 30 each, Rfl: 12  •  lisinopril (PRINIVIL,ZESTRIL) 20 MG tablet, Take 1 tablet by mouth Daily., Disp: 90 tablet, Rfl: 2  •  Livalo 2 MG tablet tablet, TAKE 1 TABLET BY MOUTH EVERY NIGHT, Disp: 90 tablet, Rfl: 0  •  metoprolol succinate XL (TOPROL-XL) 50 MG 24 hr tablet, TAKE 1 TABLET BY MOUTH EVERY DAY, Disp: 90 tablet, Rfl: 1  •  Omega-3 Fatty Acids (FISH OIL) 1200 MG capsule capsule, Take by mouth., Disp: , Rfl:   •  Tradjenta 5 MG tablet tablet, TAKE 1 TABLET BY MOUTH DAILY, Disp: 90 tablet, Rfl:  "1      Social History     Socioeconomic History   • Marital status:      Spouse name: Not on file   • Number of children: Not on file   • Years of education: Not on file   • Highest education level: Not on file   Tobacco Use   • Smoking status: Former Smoker     Quit date: 1970     Years since quittin.0   • Smokeless tobacco: Never Used   Substance and Sexual Activity   • Alcohol use: No     Comment: \"maybe one glass a month\"   • Drug use: No   • Sexual activity: Defer         Review of Systems   Constitution: Negative.   HENT: Negative.    Eyes: Negative.    Cardiovascular: Negative.    Respiratory: Negative.    Endocrine: Negative.    Skin: Negative.    Musculoskeletal: Negative.    Gastrointestinal: Negative.    Neurological: Negative.    Psychiatric/Behavioral: Negative.        Procedures      ECG 12 Lead    Date/Time: 2021 9:23 AM  Performed by: Raj Vidales MD  Authorized by: Raj Vidales MD   Comparison: compared with previous ECG from 2018  Similar to previous ECG  Rhythm: sinus rhythm  Rate: normal  Conduction: conduction normal  QRS axis: normal  Other findings: left ventricular hypertrophy with strain                Objective:    /90 (BP Location: Left arm, Patient Position: Sitting, Cuff Size: Adult)   Pulse 89   Ht 172.7 cm (68\")   Wt 65.7 kg (144 lb 12.8 oz)   SpO2 99%   BMI 22.02 kg/m²         Vitals signs reviewed.   Constitutional:       Appearance: Well-developed.   Eyes:      Pupils: Pupils are equal, round, and reactive to light.   HENT:      Head: Normocephalic.   Neck:      Musculoskeletal: Normal range of motion.      Thyroid: No thyromegaly.      Vascular: No carotid bruit or JVD.   Pulmonary:      Effort: Pulmonary effort is normal.      Breath sounds: Normal breath sounds.   Cardiovascular:      Normal rate. Regular rhythm.      No gallop.   Pulses:     Carotid: 2+ bilaterally.     Dorsalis pedis: 2+ bilaterally.     Posterior tibial: 2+ " bilaterally.  Edema:     Peripheral edema absent.   Abdominal:      General: Bowel sounds are normal.      Palpations: Abdomen is soft.   Skin:     General: Skin is warm and dry.      Findings: No erythema.   Neurological:      Mental Status: Alert and oriented to person, place, and time.             Assessment:       Diagnosis Plan   1. Coronary artery disease involving native coronary artery of native heart without angina pectoris     2. Essential hypertension     3. Dyslipidemia     4. Type 2 diabetes mellitus with other circulatory complication, without long-term current use of insulin (CMS/[facility])     5. Stage 3 chronic kidney disease, unspecified whether stage 3a or 3b CKD         1.  Coronary artery disease: Status post CABG 2014.  Normal left ventricular systolic function.  No angina pectoris or recent cardiac issues.  2.  Hypertension: Inadequately controlled.  Increase lisinopril to 20 mg daily.  We will have him follow-up in 3 months.  Review renal function as well in view of chronic kidney disease.  3.  Dyslipidemia: Recent lipid panel reviewed.  Total cholesterol 159, HDL 74, LDL 69, triglycerides 86.  Continue pitavastatin and fish oil.  4.  Diabetes mellitus, type II: On oral therapy.  Hemoglobin A1c almost 8.  Advised the patient and his wife now that Dr. Burrows is retiring he will need someone to care for his diabetes and manage.  5.  Chronic kidney disease: GFR 59.  Will monitor upon his return for blood pressure check.  We will make sure that lisinopril is not causing any further deterioration.  May need to switch to a different antihypertensive.  6.  Colon adenoma: Surgical decision pending  7.  Prostate cancer: Has received radiation therapy  Plan:   1.  From a cardiac standpoint the patient is cleared to undergo surgery.  He is asymptomatic and has not had any recent cardiac events.  2.  Increase lisinopril for elevated blood pressure  3.  Follow-up 3 months  4.  Recheck renal function in 3  months

## 2021-01-22 DIAGNOSIS — I10 HYPERTENSION, ESSENTIAL: ICD-10-CM

## 2021-01-22 DIAGNOSIS — E11.9 DIABETES MELLITUS WITHOUT COMPLICATION (HCC): ICD-10-CM

## 2021-01-22 DIAGNOSIS — R35.0 URINE FREQUENCY: ICD-10-CM

## 2021-01-22 DIAGNOSIS — E78.2 MIXED HYPERLIPIDEMIA: ICD-10-CM

## 2021-01-22 RX ORDER — LISINOPRIL 20 MG/1
20 TABLET ORAL DAILY
Qty: 90 TABLET | Refills: 0 | Status: SHIPPED | OUTPATIENT
Start: 2021-01-22 | End: 2021-03-30 | Stop reason: SDUPTHER

## 2021-01-22 RX ORDER — EZETIMIBE 10 MG/1
10 TABLET ORAL DAILY
Qty: 90 TABLET | Refills: 0 | Status: SHIPPED | OUTPATIENT
Start: 2021-01-22 | End: 2021-03-30 | Stop reason: SDUPTHER

## 2021-01-22 RX ORDER — LISINOPRIL 20 MG/1
20 TABLET ORAL DAILY
Qty: 90 TABLET | Refills: 2 | Status: SHIPPED | OUTPATIENT
Start: 2021-01-22 | End: 2021-01-22 | Stop reason: SDUPTHER

## 2021-01-22 NOTE — TELEPHONE ENCOUNTER
Caller: Gera Lira    Relationship: Self    Best call back number: [phone]    Medication needed:   Requested Prescriptions     Pending Prescriptions Disp Refills   • lisinopril (PRINIVIL,ZESTRIL) 20 MG tablet 90 tablet 2     Sig: Take 1 tablet by mouth Daily.   • ezetimibe (ZETIA) 10 MG tablet 90 tablet 0     Sig: Take 1 tablet by mouth Daily.       When do you need the refill by: 1/22/2021    What details did the patient provide when requesting the medication:    Does the patient have less than a 3 day supply:  [x] Yes  [] No    What is the patient's preferred pharmacy: [id number] - [facility] [address] [place] [address] AT [address] [place] [address] - [phone] [facility] [phone] FX

## 2021-02-02 ENCOUNTER — PREP FOR SURGERY (OUTPATIENT)
Dept: OTHER | Facility: HOSPITAL | Age: 86
End: 2021-02-02

## 2021-02-02 ENCOUNTER — TELEPHONE (OUTPATIENT)
Dept: SURGERY | Facility: CLINIC | Age: 86
End: 2021-02-02

## 2021-02-02 ENCOUNTER — OFFICE VISIT (OUTPATIENT)
Dept: INTERNAL MEDICINE | Facility: CLINIC | Age: 86
End: 2021-02-02

## 2021-02-02 VITALS
RESPIRATION RATE: 16 BRPM | HEIGHT: 68 IN | TEMPERATURE: 97.7 F | WEIGHT: 147 LBS | BODY MASS INDEX: 22.28 KG/M2 | SYSTOLIC BLOOD PRESSURE: 140 MMHG | DIASTOLIC BLOOD PRESSURE: 72 MMHG

## 2021-02-02 DIAGNOSIS — N18.31 STAGE 3A CHRONIC KIDNEY DISEASE (HCC): Chronic | ICD-10-CM

## 2021-02-02 DIAGNOSIS — I25.10 CORONARY ARTERY DISEASE INVOLVING NATIVE CORONARY ARTERY OF NATIVE HEART WITHOUT ANGINA PECTORIS: Chronic | ICD-10-CM

## 2021-02-02 DIAGNOSIS — K63.89 COLONIC MASS: Primary | ICD-10-CM

## 2021-02-02 DIAGNOSIS — E78.5 DYSLIPIDEMIA: Chronic | ICD-10-CM

## 2021-02-02 DIAGNOSIS — I10 ESSENTIAL HYPERTENSION: Chronic | ICD-10-CM

## 2021-02-02 DIAGNOSIS — E11.59 TYPE 2 DIABETES MELLITUS WITH OTHER CIRCULATORY COMPLICATION, WITHOUT LONG-TERM CURRENT USE OF INSULIN (HCC): Chronic | ICD-10-CM

## 2021-02-02 DIAGNOSIS — R09.89 RIGHT CAROTID BRUIT: ICD-10-CM

## 2021-02-02 DIAGNOSIS — Z76.89 ENCOUNTER TO ESTABLISH CARE: Primary | ICD-10-CM

## 2021-02-02 PROBLEM — Z92.3 HX OF RADIATION THERAPY: Status: ACTIVE | Noted: 2021-02-02

## 2021-02-02 PROCEDURE — 99214 OFFICE O/P EST MOD 30 MIN: CPT | Performed by: NURSE PRACTITIONER

## 2021-02-02 RX ORDER — SODIUM CHLORIDE 0.9 % (FLUSH) 0.9 %
3 SYRINGE (ML) INJECTION EVERY 12 HOURS SCHEDULED
Status: CANCELLED | OUTPATIENT
Start: 2021-03-04

## 2021-02-02 RX ORDER — SODIUM CHLORIDE 0.9 % (FLUSH) 0.9 %
10 SYRINGE (ML) INJECTION AS NEEDED
Status: CANCELLED | OUTPATIENT
Start: 2021-03-04

## 2021-02-02 RX ORDER — ALVIMOPAN 12 MG/1
12 CAPSULE ORAL ONCE
Status: CANCELLED | OUTPATIENT
Start: 2021-03-04 | End: 2021-02-09

## 2021-02-02 NOTE — TELEPHONE ENCOUNTER
Orders are in.  Patient needs bowel prep and should be off aspirin for at least 5 days (I think he may already be off)

## 2021-02-02 NOTE — ASSESSMENT & PLAN NOTE
Improving with treatment.   Continue zetia 10 mg and livalo 2 mg nightly.     Lab Results   Component Value Date    CHOL 159 11/24/2020    CHLPL 228 (H) 12/20/2014    TRIG 86 11/24/2020    HDL 74 (H) 11/24/2020    LDL 69 11/24/2020

## 2021-02-02 NOTE — ASSESSMENT & PLAN NOTE
Avoid nephrotoxic medications - especially nsaids (like Ibuprofen, aleve)   Maintain blood pressure control  Maintain blood sugar control

## 2021-02-02 NOTE — TELEPHONE ENCOUNTER
Pt called to inform office of cardiac clearance on 1/20/21. Pt would like to proceed with scheduling sigmoid colon resection.

## 2021-02-02 NOTE — ASSESSMENT & PLAN NOTE
Coronary artery disease is improving with treatment.  Continue current treatment regimen.  Cardiac status will be reassessed in 6 months.    Continues ASA and statin.

## 2021-02-02 NOTE — PROGRESS NOTES
Chief Complaint  Establish Care     Subjective:      History of Present Illness {CC  Problem List  Visit  Diagnosis   Encounters  Notes  Medications  Labs  Result Review Imaging  Media :23}     Gera Lira presents to [facility] to establish care.     Prior patient of Dr Burrows. He has retired and patient is here to establish with me.  LV: 4/24/2020 (note reviewed)     He chronically has hypertension, hyperlipidemia, diabetes type 2, prostate cancer (previous tx with radiation), CAD (MI - CABG 2014), CKD.      He had colonoscopy with Dr. Adams on December 29, 2020 for rectal bleeding.  He had multiple colon polyps.  In the distal sigmoid colon he had 18 cm single large mass.  He has had a proximate 20 pound weight loss over the last year.  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  Option presented was a sigmoid colon resection.    He had follow-up with cardiology, Dr. James, on January 20, 2021 for preop clearance.  His blood pressure was not controlled.  Lisinopril was increased to 20 mg daily.    His diabetes has been fairly well controlled.  However his A1c went from 5.9 back in April up to 7.71% November 2020.  His anemia has been worsening.  He is down to 10.8, microcytic.  Possibly related to GI bleed secondary to mass.    He has chronic kidney disease.  His creatinine is gone from 1.3-1.42 as of November 24.      Retired : 3 daughters            Objective:      Physical Exam  Vitals signs reviewed.   Constitutional:       Appearance: He is well-developed.   HENT:      Head: Normocephalic.      Comments: Wearing mask due to COVID   Eyes:      Conjunctiva/sclera: Conjunctivae normal.   Neck:      Musculoskeletal: Normal range of motion and neck supple.      Thyroid: No thyromegaly.   Cardiovascular:      Rate and Rhythm: Normal rate and regular rhythm.      Pulses: Normal pulses.      Heart sounds: Normal heart sounds.   Pulmonary:      Effort:  "Pulmonary effort is normal.      Breath sounds: Normal breath sounds.      Comments: E/U   Abdominal:      General: Bowel sounds are normal.      Palpations: Abdomen is soft.   Musculoskeletal: Normal range of motion.      Right lower leg: No edema.      Left lower leg: No edema.   Lymphadenopathy:      Cervical: No cervical adenopathy.   Skin:     General: Skin is warm and dry.      Capillary Refill: Capillary refill takes 2 to 3 seconds.   Neurological:      Mental Status: He is alert and oriented to person, place, and time.   Psychiatric:         Behavior: Behavior normal. Behavior is cooperative.         Thought Content: Thought content normal.         Judgment: Judgment normal.        Result Review  Data Reviewed:{ Labs  Result Review  Imaging  Med Tab  Media :23}          Vital Signs:   /72 Comment: left manual  Temp 97.7 °F (36.5 °C) (Oral)   Resp 16   Ht 172.7 cm (68\")   Wt 66.7 kg (147 lb)   BMI 22.35 kg/m²         Requested Prescriptions      No prescriptions requested or ordered in this encounter     Routine medications provided by this office will also be refilled via pharmacy request.       Current Outpatient Medications:   •  ALBUTEROL SULFATE  (90 Base) MCG/ACT inhaler, INHALE 2 PUFFS BY MOUTH EVERY 4 HOURS AS NEEDED, Disp: 42.5 g, Rfl: 0  •  amLODIPine (NORVASC) 10 MG tablet, TAKE 1 TABLET BY MOUTH DAILY, Disp: 90 tablet, Rfl: 1  •  aspirin 81 MG tablet, Take 1 tablet by mouth daily., Disp: , Rfl:   •  ezetimibe (ZETIA) 10 MG tablet, Take 1 tablet by mouth Daily., Disp: 90 tablet, Rfl: 0  •  glimepiride (AMARYL) 2 MG tablet, TAKE 1 TABLET BY MOUTH EVERY MORNING BEFORE BREAKFAST, Disp: 90 tablet, Rfl: 0  •  glucose blood test strip, Test once daily, Disp: 30 each, Rfl: 12  •  lisinopril (PRINIVIL,ZESTRIL) 20 MG tablet, Take 1 tablet by mouth Daily. (Patient taking differently: Take 20 mg by mouth Daily. Pt is taking two), Disp: 90 tablet, Rfl: 0  •  Livalo 2 MG tablet tablet, " TAKE 1 TABLET BY MOUTH EVERY NIGHT, Disp: 90 tablet, Rfl: 0  •  metoprolol succinate XL (TOPROL-XL) 50 MG 24 hr tablet, TAKE 1 TABLET BY MOUTH EVERY DAY, Disp: 90 tablet, Rfl: 1  •  Omega-3 Fatty Acids (FISH OIL) 1200 MG capsule capsule, Take by mouth., Disp: , Rfl:   •  Tradjenta 5 MG tablet tablet, TAKE 1 TABLET BY MOUTH DAILY, Disp: 90 tablet, Rfl: 1     Assessment and Plan:      Assessment and Plan {CC Problem List  Visit Diagnosis  ROS  Review (Popup)  [facility] Maintenance  Quality  BestPractice  Medications  SmartSets  SnapShot Encounters  Media :23}     Problem List Items Addressed This Visit        Cardiac and Vasculature    Coronary artery disease (Chronic)    Current Assessment & Plan     Coronary artery disease is improving with treatment.  Continue current treatment regimen.  Cardiac status will be reassessed in 6 months.    Continues ASA and statin.            Dyslipidemia (Chronic)    Current Assessment & Plan     Improving with treatment.   Continue zetia 10 mg and livalo 2 mg nightly.     Lab Results   Component Value Date    CHOL 159 11/24/2020    CHLPL 228 (H) 12/20/2014    TRIG 86 11/24/2020    HDL 74 (H) 11/24/2020    LDL 69 11/24/2020            Essential hypertension (Chronic)    Current Assessment & Plan     Hypertension is improving with treatment.  Continue current treatment regimen.  Dietary sodium restriction.  Regular aerobic exercise.  Blood pressure will be reassessed at the next regular appointment.    He had not taking rx this am.    Continue toprol, lisinopril, norvasc.     CKD - monitor renal function with increase of lisinopril to 20 mg daily.          Right carotid bruit       Endocrine and Metabolic    Diabetes mellitus (CMS/HCC) (Chronic)    Current Assessment & Plan     Diabetes is unchanged.   Continue current treatment regimen.  Discussed foot care.  Reminded to get yearly retinal exam.  Diabetes will be reassessed in 6 months.    Lab Results   Component Value Date     HGBA1C 7.71 (H) 11/24/2020               Genitourinary and Reproductive     Stage 3a chronic kidney disease (CMS/HCC) (Chronic)    Current Assessment & Plan     Avoid nephrotoxic medications - especially nsaids (like Ibuprofen, aleve)   Maintain blood pressure control  Maintain blood sugar control            Other Visit Diagnoses     Encounter to establish care    -  Primary          Follow Up {Instructions Charge Capture  Follow-up Communications :23}     Return in about 3 months (around 5/2/2021) for Medicare Wellness.  Patient was given instructions and counseling regarding his condition or for health maintenance advice. Please see specific information pulled into the AVS if appropriate.    Dragon disclaimer:   Much of this encounter note is an electronic transcription/translation of spoken language to printed text. The electronic translation of spoken language may permit erroneous, or at times, nonsensical words or phrases to be inadvertently transcribed; Although I have reviewed the note for such errors, some may still exist.     Additional Patient Counseling:       Patient Instructions       As a Diabetic, you need a Diabetic Eye Exam YEARLY.  Please have your provider to send a copy of the note to our office.  Fax: [phone]    Your last A1C was   Lab Results   Component Value Date    HGBA1C 7.71 (H) 11/24/2020       This is a 3 month average of your blood sugar.  Your goal is to be LESS than 7%.

## 2021-02-02 NOTE — ASSESSMENT & PLAN NOTE
Diabetes is unchanged.   Continue current treatment regimen.  Discussed foot care.  Reminded to get yearly retinal exam.  Diabetes will be reassessed in 6 months.    Lab Results   Component Value Date    HGBA1C 7.71 (H) 11/24/2020

## 2021-02-02 NOTE — ASSESSMENT & PLAN NOTE
Hypertension is improving with treatment.  Continue current treatment regimen.  Dietary sodium restriction.  Regular aerobic exercise.  Blood pressure will be reassessed at the next regular appointment.    He had not taking rx this am.    Continue toprol, lisinopril, norvasc.     CKD - monitor renal function with increase of lisinopril to 20 mg daily.

## 2021-02-02 NOTE — PATIENT INSTRUCTIONS
As a Diabetic, you need a Diabetic Eye Exam YEARLY.  Please have your provider to send a copy of the note to our office.  Fax: [phone]    Your last A1C was   Lab Results   Component Value Date    HGBA1C 7.71 (H) 11/24/2020       This is a 3 month average of your blood sugar.  Your goal is to be LESS than 7%.

## 2021-02-10 ENCOUNTER — OFFICE VISIT (OUTPATIENT)
Dept: SURGERY | Facility: CLINIC | Age: 86
End: 2021-02-10

## 2021-02-10 VITALS — HEIGHT: 68 IN | WEIGHT: 154.2 LBS | BODY MASS INDEX: 23.37 KG/M2

## 2021-02-10 DIAGNOSIS — K63.89 COLONIC MASS: Primary | ICD-10-CM

## 2021-02-10 PROCEDURE — 99214 OFFICE O/P EST MOD 30 MIN: CPT | Performed by: SURGERY

## 2021-02-10 NOTE — PROGRESS NOTES
Cc:  Distal sigmoid colon mass     History of presenting illness:   This is a very nice 85-year-old gentleman with a history of coronary artery disease, cardiomyopathy, diabetes and prostate cancer who says that several weeks ago he had a fairly large bloody bowel movements which was painless.  His description of it is little bit unclear, but it does not sound like it was bright red rectal bleeding.  Since then he has intermittently had a small amount of blood, predominantly on the toilet paper.  He also has multiple days which were combined with normal bowel movements without any blood.  He has had an associated weight loss of around 20 pounds over the last year or so, although he attributes most of that to difficulties with his teeth and dentures.  He denies any abdominal pain.  No change in appetite.  He has been undergoing prostate radiation for his prostate cancer and completed this around a month ago.     He underwent colonoscopy recently with finding of a mass in the distal sigmoid, endoscopically unresectable and biopsies have shown tubulovillous adenoma.     Past Medical History: Coronary artery disease, myocardial infarction, cardiomyopathy, hypertension, type 2 diabetes, prostate cancer     Past Surgical History: Patient reports his last colonoscopy was around 7 years ago he believes, and he thinks it was normal.  He cannot recall exactly where it was performed.  He underwent coronary artery bypass grafting around 10 years ago and prior to that had cardiac stenting.     Medications: Reviewed and reconciled in epic.  Aspirin is listed but he has not taken this for several months.  No other anticoagulants.     Allergies: None known     Social History: He is a former smoker who quit in the 1970s, uses alcohol occasionally, lives independently.     Family History: Negative for known colon and rectal cancer     Review of Systems:  Constitutional: Positive for unintentional weight loss of around 20 pounds,  negative for fever, chills  Neck: no swollen glands or dysphagia or odynophagia  Respiratory: negative for SOB, cough, hemoptysis or wheezing  Cardiovascular: negative for chest pain, palpitations or peripheral edema  Gastrointestinal: Negative for nausea, vomiting, abdominal pain, positive for rectal bleeding as described above        Physical Exam:   Body mass index 23.4  General: alert and oriented, appropriate, no acute distress  Neck: Supple without lymphadenopathy or thyromegaly, trachea is in the midline  Respiratory: Lungs are clear bilaterally without wheezing, no use of accessory muscles is noted  Cardiovascular: Regular rate and rhythm without murmur, no peripheral edema  Gastrointestinal: Soft and benign, no mass, no hernia  Rectal: Digital rectal exam is negative.  There is no blood on the glove.  There is no mass and no obvious hemorrhoids are seen     Laboratory data: Recent hemoglobin is 10.8 with an MCV of 77.4.  Hemoglobin A1c slightly elevated at about 7.1.  Pathology of distal sigmoid mass demonstrates tubulovillous adenoma.     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  I have advised him that I recommend sigmoid colon resection.  He has undergone cardiac evaluation and they feel he is a reasonable candidate.  I again discussed with the patient and his family options for operative intervention and he is agreeable to proceed.  We are going to schedule him for laparoscopic sigmoid resection with da Cameron robot assistance.  The possible need for temporary and/or long-term diversion was also discussed. The risks associated with the procedure are noted to include, but not be limited to, bleeding, infection, injury to  intra-abdominal structures, injury to the ureter bladder, anastomotic leak, fistula, etc.       Darek Adams MD, FACS  General, Minimally Invasive and Endoscopic Surgery  [facility]     [address], [address]  [place], [place], [address]  P: [phone]  F: [phone]

## 2021-03-02 ENCOUNTER — APPOINTMENT (OUTPATIENT)
Dept: PREADMISSION TESTING | Facility: HOSPITAL | Age: 86
End: 2021-03-02

## 2021-03-02 VITALS
WEIGHT: 152 LBS | OXYGEN SATURATION: 98 % | TEMPERATURE: 97.2 F | RESPIRATION RATE: 20 BRPM | SYSTOLIC BLOOD PRESSURE: 175 MMHG | BODY MASS INDEX: 23.04 KG/M2 | HEART RATE: 80 BPM | HEIGHT: 68 IN | DIASTOLIC BLOOD PRESSURE: 89 MMHG

## 2021-03-02 DIAGNOSIS — K63.89 COLONIC MASS: ICD-10-CM

## 2021-03-02 DIAGNOSIS — Z23 IMMUNIZATION DUE: ICD-10-CM

## 2021-03-02 LAB
ANION GAP SERPL CALCULATED.3IONS-SCNC: 8.8 MMOL/L (ref 5–15)
BASOPHILS # BLD AUTO: 0.02 10*3/MM3 (ref 0–0.2)
BASOPHILS NFR BLD AUTO: 0.3 % (ref 0–1.5)
BUN SERPL-MCNC: 17 MG/DL (ref 8–23)
BUN/CREAT SERPL: 12.8 (ref 7–25)
CALCIUM SPEC-SCNC: 9.3 MG/DL (ref 8.6–10.5)
CHLORIDE SERPL-SCNC: 103 MMOL/L (ref 98–107)
CO2 SERPL-SCNC: 30.2 MMOL/L (ref 22–29)
CREAT SERPL-MCNC: 1.33 MG/DL (ref 0.76–1.27)
DEPRECATED RDW RBC AUTO: 37.2 FL (ref 37–54)
EOSINOPHIL # BLD AUTO: 0.08 10*3/MM3 (ref 0–0.4)
EOSINOPHIL NFR BLD AUTO: 1.3 % (ref 0.3–6.2)
ERYTHROCYTE [DISTWIDTH] IN BLOOD BY AUTOMATED COUNT: 12.3 % (ref 12.3–15.4)
GFR SERPL CREATININE-BSD FRML MDRD: 62 ML/MIN/1.73
GLUCOSE SERPL-MCNC: 178 MG/DL (ref 65–99)
HCT VFR BLD AUTO: 37.6 % (ref 37.5–51)
HGB BLD-MCNC: 10.6 G/DL (ref 13–17.7)
IMM GRANULOCYTES # BLD AUTO: 0.02 10*3/MM3 (ref 0–0.05)
IMM GRANULOCYTES NFR BLD AUTO: 0.3 % (ref 0–0.5)
LYMPHOCYTES # BLD AUTO: 1.22 10*3/MM3 (ref 0.7–3.1)
LYMPHOCYTES NFR BLD AUTO: 19.2 % (ref 19.6–45.3)
MCH RBC QN AUTO: 23.2 PG (ref 26.6–33)
MCHC RBC AUTO-ENTMCNC: 28.2 G/DL (ref 31.5–35.7)
MCV RBC AUTO: 82.3 FL (ref 79–97)
MONOCYTES # BLD AUTO: 0.63 10*3/MM3 (ref 0.1–0.9)
MONOCYTES NFR BLD AUTO: 9.9 % (ref 5–12)
NEUTROPHILS NFR BLD AUTO: 4.4 10*3/MM3 (ref 1.7–7)
NEUTROPHILS NFR BLD AUTO: 69 % (ref 42.7–76)
NRBC BLD AUTO-RTO: 0 /100 WBC (ref 0–0.2)
PLATELET # BLD AUTO: 271 10*3/MM3 (ref 140–450)
PMV BLD AUTO: 11 FL (ref 6–12)
POTASSIUM SERPL-SCNC: 4.3 MMOL/L (ref 3.5–5.2)
RBC # BLD AUTO: 4.57 10*6/MM3 (ref 4.14–5.8)
SODIUM SERPL-SCNC: 142 MMOL/L (ref 136–145)
WBC # BLD AUTO: 6.37 10*3/MM3 (ref 3.4–10.8)

## 2021-03-02 PROCEDURE — U0004 COV-19 TEST NON-CDC HGH THRU: HCPCS | Performed by: NURSE PRACTITIONER

## 2021-03-02 PROCEDURE — U0005 INFEC AGEN DETEC AMPLI PROBE: HCPCS | Performed by: NURSE PRACTITIONER

## 2021-03-02 PROCEDURE — 80048 BASIC METABOLIC PNL TOTAL CA: CPT

## 2021-03-02 PROCEDURE — C9803 HOPD COVID-19 SPEC COLLECT: HCPCS | Performed by: NURSE PRACTITIONER

## 2021-03-02 PROCEDURE — 85025 COMPLETE CBC W/AUTO DIFF WBC: CPT

## 2021-03-02 PROCEDURE — 36415 COLL VENOUS BLD VENIPUNCTURE: CPT

## 2021-03-02 RX ORDER — CHLORHEXIDINE GLUCONATE 500 MG/1
1 CLOTH TOPICAL TAKE AS DIRECTED
COMMUNITY
End: 2021-03-07 | Stop reason: HOSPADM

## 2021-03-02 NOTE — DISCHARGE INSTRUCTIONS
CHLORHEXIDINE CLOTH INSTRUCTIONS  The morning of surgery follow these instructions using the Chlorhexidine cloths you've been given.  These steps reduce bacteria on the body.  Do not use the cloths near your eyes, ears mouth, genitalia or on open wounds.  Throw the cloths away after use but do not try to flush them down a toilet.      • Open and remove one cloth at a time from the package.    • Leave the cloth unfolded and begin the bathing.  • Massage the skin with the cloths using gentle pressure to remove bacteria.  Do not scrub harshly.   • Follow the steps below with one 2% CHG cloth per area (6 total cloths).  • One cloth for neck, shoulders and chest.  • One cloth for both arms, hands, fingers and underarms (do underarms last).  • One cloth for the abdomen followed by groin.  • One cloth for right leg and foot including between the toes.  • One cloth for left leg and foot including between the toes.  • The last cloth is to be used for the back of the neck, back and buttocks.    Allow the CHG to air dry 3 minutes on the skin which will give it time to work and decrease the chance of irritation.  The skin may feel sticky until it is dry.  Do not rinse with water or any other liquid or you will lose the beneficial effects of the CHG.  If mild skin irritation occurs, do rinse the skin to remove the CHG.  Report this to the nurse at time of admission.  Do not apply lotions, creams, ointments, deodorants or perfumes after using the clothes. Dress in clean clothes before coming to the hospital.    Take the following medications the morning of surgery:  METOPROLOL AND AMLODIPINE, INHALER     ARRIVAL TIME 1000 TO MAIN OR           If you are on prescription narcotic pain medication to control your pain you may also take that medication the morning of surgery.    General Instructions:  • Do not eat solid food after midnight the night before surgery.  • You may drink clear liquids day of surgery but must stop at least  one hour before your hospital arrival time -CUTOFF TIME 0900  • It is beneficial for you to have a clear drink that contains carbohydrates the day of surgery.  We suggest a 12 to 20 ounce bottle of Gatorade or Powerade for non-diabetic patients or a 12 to 20 ounce bottle of G2 or Powerade Zero for diabetic patients. (Pediatric patients, are not advised to drink a 12 to 20 ounce carbohydrate drink)    Clear liquids are liquids you can see through.  Nothing red in color.     Plain water                               Sports drinks  Sodas                                   Gelatin (Jell-O)  Fruit juices without pulp such as white grape juice and apple juice  Popsicles that contain no fruit or yogurt  Tea or coffee (no cream or milk added)  Gatorade / Powerade  G2 / Powerade Zero    • Infants may have breast milk up to four hours before surgery.  • Infants drinking formula may drink formula up to six hours before surgery.   • Patients who avoid smoking, chewing tobacco and alcohol for 4 weeks prior to surgery have a reduced risk of post-operative complications.  Quit smoking as many days before surgery as you can.  • Do not smoke, use chewing tobacco or drink alcohol the day of surgery.   • If applicable bring your C-PAP/ BI-PAP machine.  • Bring any papers given to you in the doctor’s office.  • Wear clean comfortable clothes.  • Do not wear contact lenses, false eyelashes or make-up.  Bring a case for your glasses.   • Bring crutches or walker if applicable.  • Remove all piercings.  Leave jewelry and any other valuables at home.  • Hair extensions with metal clips must be removed prior to surgery.  • The Pre-Admission Testing nurse will instruct you to bring medications if unable to obtain an accurate list in Pre-Admission Testing.            Preventing a Surgical Site Infection:  • For 2 to 3 days before surgery, avoid shaving with a razor because the razor can irritate skin and make it easier to develop an infection.     • Any areas of open skin can increase the risk of a post-operative wound infection by allowing bacteria to enter and travel throughout the body.  Notify your surgeon if you have any skin wounds / rashes even if it is not near the expected surgical site.  The area will need assessed to determine if surgery should be delayed until it is healed.  • The night prior to surgery shower using a fresh bar of anti-bacterial soap (such as Dial) and clean washcloth.  Sleep in a clean bed with clean clothing.  Do not allow pets to sleep with you.  • Shower on the morning of surgery using a fresh bar of anti-bacterial soap (such as Dial) and clean washcloth.  Dry with a clean towel and dress in clean clothing.  • Ask your surgeon if you will be receiving antibiotics prior to surgery.  • Make sure you, your family, and all healthcare providers clean their hands with soap and water or an alcohol based hand  before caring for you or your wound.    Day of surgery:  Your arrival time is approximately two hours before your scheduled surgery time.  Upon arrival, a Pre-op nurse and Anesthesiologist will review your health history, obtain vital signs, and answer questions you may have.  The only belongings needed at this time will be a list of your home medications and if applicable your C-PAP/BI-PAP machine.  A Pre-op nurse will start an IV and you may receive medication in preparation for surgery, including something to help you relax.     Please be aware that surgery does come with discomfort.  We want to make every effort to control your discomfort so please discuss any uncontrolled symptoms with your nurse.   Your doctor will most likely have prescribed pain medications.      If you are going home after surgery you will receive individualized written care instructions before being discharged.  A responsible adult must drive you to and from the hospital on the day of your surgery and stay with you for 24 hours.  Discharge  prescriptions can be filled by the hospital pharmacy during regular pharmacy hours.  If you are having surgery late in the day/evening your prescription may be e-prescribed to your pharmacy.  Please verify your pharmacy hours or chose a 24 hour pharmacy to avoid not having access to your prescription because your pharmacy has closed for the day.    If you are staying overnight following surgery, you will be transported to your hospital room following the recovery period.  [facility] has all private rooms.    If you have any questions please call Pre-Admission Testing at [phone].  Deductibles and co-payments are collected on the day of service. Please be prepared to pay the required co-pay, deductible or deposit on the day of service as defined by your plan.    Patient Education for Self-Quarantine Process    Following your COVID testing, we strongly recommend that you do not leave your home after you have been tested for COVID except to get medical care. This includes not going to work, school or to public areas.  If this is not possible for you to do please limit your activities to only required outings.  Be sure to wear a mask when you are with other people, practice social distancing and wash your hands frequently.      The following items provide additional details to keep you safe.  • Wash your hands with soap and water frequently for at least 20 seconds.   • Avoid touching your eyes, nose and mouth with unwashed hands.  • Do not share anything - utensils, towels, food from the same bowl.   • Have your own utensils, drinking glass, dishes, towels and bedding.   • Do not have visitors.   • Do use FaceTime to stay in touch with family and friends.  • You should stay in a specific room away from others if possible.   • Stay at least 6 feet away from others in the home if you cannot have a dedicated room to yourself.   • Do not snuggle with your pet. While the CDC says there is no evidence that  pets can spread COVID-19 or be infected from humans, it is probably best to avoid “petting, snuggling, being kissed or licked and sharing food (during self-quarantine)”, according to the CDC.   • Sanitize household surfaces daily. Include all high touch areas (door handles, light switches, phones, countertops, etc.)  • Do not share a bathroom with others, if possible.   • Wear a mask around others in your home if you are unable to stay in a separate room or 6 feet apart. If  you are unable to wear a mask, have your family member wear a mask if they must be within 6 feet of you.   Call your surgeon immediately if you experience any of the following symptoms:  • Sore Throat  • Shortness of Breath or difficulty breathing  • Cough  • Chills  • Body soreness or muscle pain  • Headache  • Fever  • New loss of taste or smell  • Do not arrive for your surgery ill.  Your procedure will need to be rescheduled to another time.  You will need to call your physician before the day of surgery to avoid any unnecessary exposure to hospital staff as well as other patients.

## 2021-03-03 LAB — SARS-COV-2 RNA RESP QL NAA+PROBE: NOT DETECTED

## 2021-03-04 ENCOUNTER — ANESTHESIA EVENT (OUTPATIENT)
Dept: PERIOP | Facility: HOSPITAL | Age: 86
End: 2021-03-04

## 2021-03-04 ENCOUNTER — ANESTHESIA (OUTPATIENT)
Dept: PERIOP | Facility: HOSPITAL | Age: 86
End: 2021-03-04

## 2021-03-04 ENCOUNTER — HOSPITAL ENCOUNTER (INPATIENT)
Facility: HOSPITAL | Age: 86
LOS: 3 days | Discharge: HOME | End: 2021-03-07
Attending: SURGERY | Admitting: SURGERY

## 2021-03-04 DIAGNOSIS — K63.89 COLONIC MASS: ICD-10-CM

## 2021-03-04 DIAGNOSIS — K63.89 MASS OF COLON: Primary | ICD-10-CM

## 2021-03-04 LAB
GLUCOSE BLDC GLUCOMTR-MCNC: 134 MG/DL (ref 70–130)
GLUCOSE BLDC GLUCOMTR-MCNC: 200 MG/DL (ref 70–130)
GLUCOSE BLDC GLUCOMTR-MCNC: 233 MG/DL (ref 70–130)

## 2021-03-04 PROCEDURE — 88305 TISSUE EXAM BY PATHOLOGIST: CPT | Performed by: SURGERY

## 2021-03-04 PROCEDURE — 44207 L COLECTOMY/COLOPROCTOSTOMY: CPT | Performed by: SURGERY

## 2021-03-04 PROCEDURE — 25010000002 NEOSTIGMINE PER 0.5 MG: Performed by: NURSE ANESTHETIST, CERTIFIED REGISTERED

## 2021-03-04 PROCEDURE — 25010000002 FENTANYL CITRATE (PF) 100 MCG/2ML SOLUTION: Performed by: NURSE ANESTHETIST, CERTIFIED REGISTERED

## 2021-03-04 PROCEDURE — S2900 ROBOTIC SURGICAL SYSTEM: HCPCS | Performed by: SURGERY

## 2021-03-04 PROCEDURE — 25010000002 PROPOFOL 10 MG/ML EMULSION: Performed by: NURSE ANESTHETIST, CERTIFIED REGISTERED

## 2021-03-04 PROCEDURE — 82962 GLUCOSE BLOOD TEST: CPT

## 2021-03-04 PROCEDURE — 25810000003 SODIUM CHLORIDE 0.9 % WITH KCL 20 MEQ 20-0.9 MEQ/L-% SOLUTION: Performed by: SURGERY

## 2021-03-04 PROCEDURE — 25010000002 CEFOXITIN PER 1 G: Performed by: SURGERY

## 2021-03-04 PROCEDURE — 25010000002 MAGNESIUM SULFATE PER 500 MG OF MAGNESIUM: Performed by: NURSE ANESTHETIST, CERTIFIED REGISTERED

## 2021-03-04 PROCEDURE — 25010000002 ONDANSETRON PER 1 MG: Performed by: NURSE ANESTHETIST, CERTIFIED REGISTERED

## 2021-03-04 PROCEDURE — 25010000002 DEXAMETHASONE PER 1 MG: Performed by: NURSE ANESTHETIST, CERTIFIED REGISTERED

## 2021-03-04 PROCEDURE — 25010000002 PHENYLEPHRINE PER 1 ML: Performed by: NURSE ANESTHETIST, CERTIFIED REGISTERED

## 2021-03-04 PROCEDURE — 8E0W4CZ ROBOTIC ASSISTED PROCEDURE OF TRUNK REGION, PERCUTANEOUS ENDOSCOPIC APPROACH: ICD-10-PCS | Performed by: SURGERY

## 2021-03-04 PROCEDURE — 25010000002 HYDROMORPHONE PER 4 MG: Performed by: NURSE ANESTHETIST, CERTIFIED REGISTERED

## 2021-03-04 PROCEDURE — 25010000002 ONDANSETRON PER 1 MG: Performed by: SURGERY

## 2021-03-04 PROCEDURE — 63710000001 INSULIN REGULAR HUMAN PER 5 UNITS: Performed by: SURGERY

## 2021-03-04 PROCEDURE — 0DTN4ZZ RESECTION OF SIGMOID COLON, PERCUTANEOUS ENDOSCOPIC APPROACH: ICD-10-PCS | Performed by: SURGERY

## 2021-03-04 DEVICE — SUREFORM 45 RELOAD GREEN
Type: IMPLANTABLE DEVICE | Site: ABDOMEN | Status: FUNCTIONAL
Brand: SUREFORM

## 2021-03-04 DEVICE — CELLULAR STAPLER WITH TRI-STAPLE TECHNOLOGY
Type: IMPLANTABLE DEVICE | Site: ABDOMEN | Status: FUNCTIONAL
Brand: EEA

## 2021-03-04 DEVICE — SUREFORM 45 RELOAD WHITE
Type: IMPLANTABLE DEVICE | Site: ABDOMEN | Status: FUNCTIONAL
Brand: SUREFORM

## 2021-03-04 DEVICE — SUREFORM 45 RELOAD BLUE
Type: IMPLANTABLE DEVICE | Site: ABDOMEN | Status: FUNCTIONAL
Brand: SUREFORM

## 2021-03-04 RX ORDER — LIDOCAINE HYDROCHLORIDE 20 MG/ML
INJECTION, SOLUTION INFILTRATION; PERINEURAL AS NEEDED
Status: DISCONTINUED | OUTPATIENT
Start: 2021-03-04 | End: 2021-03-04 | Stop reason: SURG

## 2021-03-04 RX ORDER — GLYCOPYRROLATE 0.2 MG/ML
INJECTION INTRAMUSCULAR; INTRAVENOUS AS NEEDED
Status: DISCONTINUED | OUTPATIENT
Start: 2021-03-04 | End: 2021-03-04 | Stop reason: SURG

## 2021-03-04 RX ORDER — CELECOXIB 100 MG/1
100 CAPSULE ORAL EVERY 12 HOURS SCHEDULED
Status: DISCONTINUED | OUTPATIENT
Start: 2021-03-04 | End: 2021-03-07 | Stop reason: HOSPADM

## 2021-03-04 RX ORDER — ROCURONIUM BROMIDE 10 MG/ML
INJECTION, SOLUTION INTRAVENOUS AS NEEDED
Status: DISCONTINUED | OUTPATIENT
Start: 2021-03-04 | End: 2021-03-04 | Stop reason: SURG

## 2021-03-04 RX ORDER — OXYCODONE AND ACETAMINOPHEN 7.5; 325 MG/1; MG/1
1 TABLET ORAL ONCE AS NEEDED
Status: DISCONTINUED | OUTPATIENT
Start: 2021-03-04 | End: 2021-03-04 | Stop reason: HOSPADM

## 2021-03-04 RX ORDER — EPHEDRINE SULFATE 50 MG/ML
INJECTION, SOLUTION INTRAVENOUS AS NEEDED
Status: DISCONTINUED | OUTPATIENT
Start: 2021-03-04 | End: 2021-03-04 | Stop reason: SURG

## 2021-03-04 RX ORDER — NALOXONE HCL 0.4 MG/ML
0.2 VIAL (ML) INJECTION AS NEEDED
Status: DISCONTINUED | OUTPATIENT
Start: 2021-03-04 | End: 2021-03-04 | Stop reason: HOSPADM

## 2021-03-04 RX ORDER — SODIUM CHLORIDE, SODIUM LACTATE, POTASSIUM CHLORIDE, CALCIUM CHLORIDE 600; 310; 30; 20 MG/100ML; MG/100ML; MG/100ML; MG/100ML
9 INJECTION, SOLUTION INTRAVENOUS CONTINUOUS
Status: DISCONTINUED | OUTPATIENT
Start: 2021-03-04 | End: 2021-03-04

## 2021-03-04 RX ORDER — DEXAMETHASONE SODIUM PHOSPHATE 10 MG/ML
INJECTION INTRAMUSCULAR; INTRAVENOUS AS NEEDED
Status: DISCONTINUED | OUTPATIENT
Start: 2021-03-04 | End: 2021-03-04 | Stop reason: SURG

## 2021-03-04 RX ORDER — METOPROLOL SUCCINATE 50 MG/1
50 TABLET, EXTENDED RELEASE ORAL NIGHTLY
Status: DISCONTINUED | OUTPATIENT
Start: 2021-03-04 | End: 2021-03-07 | Stop reason: HOSPADM

## 2021-03-04 RX ORDER — FENTANYL CITRATE 50 UG/ML
INJECTION, SOLUTION INTRAMUSCULAR; INTRAVENOUS AS NEEDED
Status: DISCONTINUED | OUTPATIENT
Start: 2021-03-04 | End: 2021-03-04 | Stop reason: SURG

## 2021-03-04 RX ORDER — ULTRASOUND COUPLING MEDIUM
GEL (GRAM) TOPICAL AS NEEDED
Status: DISCONTINUED | OUTPATIENT
Start: 2021-03-04 | End: 2021-03-04 | Stop reason: HOSPADM

## 2021-03-04 RX ORDER — ONDANSETRON 2 MG/ML
INJECTION INTRAMUSCULAR; INTRAVENOUS AS NEEDED
Status: DISCONTINUED | OUTPATIENT
Start: 2021-03-04 | End: 2021-03-04 | Stop reason: SURG

## 2021-03-04 RX ORDER — AMLODIPINE BESYLATE 10 MG/1
10 TABLET ORAL NIGHTLY
Status: DISCONTINUED | OUTPATIENT
Start: 2021-03-04 | End: 2021-03-07 | Stop reason: HOSPADM

## 2021-03-04 RX ORDER — HYDROCODONE BITARTRATE AND ACETAMINOPHEN 5; 325 MG/1; MG/1
1 TABLET ORAL EVERY 4 HOURS PRN
Status: DISCONTINUED | OUTPATIENT
Start: 2021-03-04 | End: 2021-03-07 | Stop reason: HOSPADM

## 2021-03-04 RX ORDER — LABETALOL HYDROCHLORIDE 5 MG/ML
5 INJECTION, SOLUTION INTRAVENOUS
Status: DISCONTINUED | OUTPATIENT
Start: 2021-03-04 | End: 2021-03-04 | Stop reason: HOSPADM

## 2021-03-04 RX ORDER — ONDANSETRON 2 MG/ML
4 INJECTION INTRAMUSCULAR; INTRAVENOUS ONCE AS NEEDED
Status: DISCONTINUED | OUTPATIENT
Start: 2021-03-04 | End: 2021-03-04 | Stop reason: HOSPADM

## 2021-03-04 RX ORDER — PROMETHAZINE HYDROCHLORIDE 25 MG/1
25 TABLET ORAL ONCE AS NEEDED
Status: DISCONTINUED | OUTPATIENT
Start: 2021-03-04 | End: 2021-03-04 | Stop reason: HOSPADM

## 2021-03-04 RX ORDER — LIDOCAINE HYDROCHLORIDE 10 MG/ML
0.5 INJECTION, SOLUTION EPIDURAL; INFILTRATION; INTRACAUDAL; PERINEURAL ONCE AS NEEDED
Status: DISCONTINUED | OUTPATIENT
Start: 2021-03-04 | End: 2021-03-04 | Stop reason: HOSPADM

## 2021-03-04 RX ORDER — DIPHENHYDRAMINE HYDROCHLORIDE 50 MG/ML
12.5 INJECTION INTRAMUSCULAR; INTRAVENOUS
Status: DISCONTINUED | OUTPATIENT
Start: 2021-03-04 | End: 2021-03-04 | Stop reason: HOSPADM

## 2021-03-04 RX ORDER — GABAPENTIN 100 MG/1
100 CAPSULE ORAL EVERY 8 HOURS SCHEDULED
Status: DISCONTINUED | OUTPATIENT
Start: 2021-03-04 | End: 2021-03-07 | Stop reason: HOSPADM

## 2021-03-04 RX ORDER — FENTANYL CITRATE 50 UG/ML
50 INJECTION, SOLUTION INTRAMUSCULAR; INTRAVENOUS
Status: DISCONTINUED | OUTPATIENT
Start: 2021-03-04 | End: 2021-03-04 | Stop reason: HOSPADM

## 2021-03-04 RX ORDER — ONDANSETRON 4 MG/1
4 TABLET, FILM COATED ORAL EVERY 6 HOURS PRN
Status: DISCONTINUED | OUTPATIENT
Start: 2021-03-04 | End: 2021-03-07 | Stop reason: HOSPADM

## 2021-03-04 RX ORDER — ALBUTEROL SULFATE 90 UG/1
2 AEROSOL, METERED RESPIRATORY (INHALATION) EVERY 4 HOURS PRN
Status: DISCONTINUED | OUTPATIENT
Start: 2021-03-04 | End: 2021-03-04

## 2021-03-04 RX ORDER — FAMOTIDINE 10 MG/ML
20 INJECTION, SOLUTION INTRAVENOUS ONCE
Status: COMPLETED | OUTPATIENT
Start: 2021-03-04 | End: 2021-03-04

## 2021-03-04 RX ORDER — HYDROMORPHONE HYDROCHLORIDE 1 MG/ML
0.5 INJECTION, SOLUTION INTRAMUSCULAR; INTRAVENOUS; SUBCUTANEOUS
Status: DISCONTINUED | OUTPATIENT
Start: 2021-03-04 | End: 2021-03-04 | Stop reason: HOSPADM

## 2021-03-04 RX ORDER — ALBUTEROL SULFATE 2.5 MG/3ML
2.5 SOLUTION RESPIRATORY (INHALATION) EVERY 4 HOURS PRN
Status: DISCONTINUED | OUTPATIENT
Start: 2021-03-04 | End: 2021-03-07 | Stop reason: HOSPADM

## 2021-03-04 RX ORDER — SODIUM CHLORIDE, SODIUM LACTATE, POTASSIUM CHLORIDE, AND CALCIUM CHLORIDE .6; .31; .03; .02 G/100ML; G/100ML; G/100ML; G/100ML
9 INJECTION, SOLUTION INTRAVENOUS CONTINUOUS
Status: DISCONTINUED | OUTPATIENT
Start: 2021-03-04 | End: 2021-03-04

## 2021-03-04 RX ORDER — SODIUM CHLORIDE AND POTASSIUM CHLORIDE 150; 900 MG/100ML; MG/100ML
75 INJECTION, SOLUTION INTRAVENOUS CONTINUOUS
Status: DISCONTINUED | OUTPATIENT
Start: 2021-03-04 | End: 2021-03-06

## 2021-03-04 RX ORDER — SODIUM CHLORIDE 0.9 % (FLUSH) 0.9 %
3 SYRINGE (ML) INJECTION EVERY 12 HOURS SCHEDULED
Status: DISCONTINUED | OUTPATIENT
Start: 2021-03-04 | End: 2021-03-04 | Stop reason: HOSPADM

## 2021-03-04 RX ORDER — SODIUM CHLORIDE 0.9 % (FLUSH) 0.9 %
3-10 SYRINGE (ML) INJECTION AS NEEDED
Status: DISCONTINUED | OUTPATIENT
Start: 2021-03-04 | End: 2021-03-04 | Stop reason: HOSPADM

## 2021-03-04 RX ORDER — INDOCYANINE GREEN AND WATER 25 MG
KIT INJECTION AS NEEDED
Status: DISCONTINUED | OUTPATIENT
Start: 2021-03-04 | End: 2021-03-04 | Stop reason: SURG

## 2021-03-04 RX ORDER — EPHEDRINE SULFATE 50 MG/ML
5 INJECTION, SOLUTION INTRAVENOUS ONCE AS NEEDED
Status: DISCONTINUED | OUTPATIENT
Start: 2021-03-04 | End: 2021-03-04 | Stop reason: HOSPADM

## 2021-03-04 RX ORDER — DIPHENHYDRAMINE HCL 25 MG
25 CAPSULE ORAL
Status: DISCONTINUED | OUTPATIENT
Start: 2021-03-04 | End: 2021-03-04 | Stop reason: HOSPADM

## 2021-03-04 RX ORDER — MIDAZOLAM HYDROCHLORIDE 1 MG/ML
1 INJECTION INTRAMUSCULAR; INTRAVENOUS
Status: DISCONTINUED | OUTPATIENT
Start: 2021-03-04 | End: 2021-03-04 | Stop reason: HOSPADM

## 2021-03-04 RX ORDER — HYDROCODONE BITARTRATE AND ACETAMINOPHEN 7.5; 325 MG/1; MG/1
1 TABLET ORAL ONCE AS NEEDED
Status: DISCONTINUED | OUTPATIENT
Start: 2021-03-04 | End: 2021-03-04 | Stop reason: HOSPADM

## 2021-03-04 RX ORDER — BUPIVACAINE HYDROCHLORIDE 2.5 MG/ML
INJECTION, SOLUTION EPIDURAL; INFILTRATION; INTRACAUDAL AS NEEDED
Status: DISCONTINUED | OUTPATIENT
Start: 2021-03-04 | End: 2021-03-04 | Stop reason: HOSPADM

## 2021-03-04 RX ORDER — LISINOPRIL 20 MG/1
20 TABLET ORAL DAILY
Status: DISCONTINUED | OUTPATIENT
Start: 2021-03-05 | End: 2021-03-07 | Stop reason: HOSPADM

## 2021-03-04 RX ORDER — SODIUM CHLORIDE 9 MG/ML
INJECTION, SOLUTION INTRAVENOUS AS NEEDED
Status: DISCONTINUED | OUTPATIENT
Start: 2021-03-04 | End: 2021-03-04 | Stop reason: HOSPADM

## 2021-03-04 RX ORDER — PROMETHAZINE HYDROCHLORIDE 25 MG/1
25 SUPPOSITORY RECTAL ONCE AS NEEDED
Status: DISCONTINUED | OUTPATIENT
Start: 2021-03-04 | End: 2021-03-04 | Stop reason: HOSPADM

## 2021-03-04 RX ORDER — ONDANSETRON 2 MG/ML
4 INJECTION INTRAMUSCULAR; INTRAVENOUS EVERY 6 HOURS PRN
Status: DISCONTINUED | OUTPATIENT
Start: 2021-03-04 | End: 2021-03-07 | Stop reason: HOSPADM

## 2021-03-04 RX ORDER — PROPOFOL 10 MG/ML
VIAL (ML) INTRAVENOUS AS NEEDED
Status: DISCONTINUED | OUTPATIENT
Start: 2021-03-04 | End: 2021-03-04 | Stop reason: SURG

## 2021-03-04 RX ORDER — MAGNESIUM HYDROXIDE 1200 MG/15ML
LIQUID ORAL AS NEEDED
Status: DISCONTINUED | OUTPATIENT
Start: 2021-03-04 | End: 2021-03-04 | Stop reason: HOSPADM

## 2021-03-04 RX ORDER — MIDAZOLAM HYDROCHLORIDE 1 MG/ML
0.5 INJECTION INTRAMUSCULAR; INTRAVENOUS
Status: DISCONTINUED | OUTPATIENT
Start: 2021-03-04 | End: 2021-03-04 | Stop reason: HOSPADM

## 2021-03-04 RX ORDER — ESMOLOL HYDROCHLORIDE 10 MG/ML
INJECTION INTRAVENOUS AS NEEDED
Status: DISCONTINUED | OUTPATIENT
Start: 2021-03-04 | End: 2021-03-04 | Stop reason: SURG

## 2021-03-04 RX ORDER — SODIUM CHLORIDE 0.9 % (FLUSH) 0.9 %
10 SYRINGE (ML) INJECTION AS NEEDED
Status: DISCONTINUED | OUTPATIENT
Start: 2021-03-04 | End: 2021-03-04 | Stop reason: HOSPADM

## 2021-03-04 RX ORDER — FLUMAZENIL 0.1 MG/ML
0.2 INJECTION INTRAVENOUS AS NEEDED
Status: DISCONTINUED | OUTPATIENT
Start: 2021-03-04 | End: 2021-03-04 | Stop reason: HOSPADM

## 2021-03-04 RX ORDER — ALVIMOPAN 12 MG/1
12 CAPSULE ORAL ONCE
Status: COMPLETED | OUTPATIENT
Start: 2021-03-04 | End: 2021-03-04

## 2021-03-04 RX ORDER — MAGNESIUM SULFATE HEPTAHYDRATE 500 MG/ML
INJECTION, SOLUTION INTRAMUSCULAR; INTRAVENOUS AS NEEDED
Status: DISCONTINUED | OUTPATIENT
Start: 2021-03-04 | End: 2021-03-04 | Stop reason: SURG

## 2021-03-04 RX ORDER — ACETAMINOPHEN 500 MG
500 TABLET ORAL EVERY 6 HOURS
Status: DISCONTINUED | OUTPATIENT
Start: 2021-03-04 | End: 2021-03-07 | Stop reason: HOSPADM

## 2021-03-04 RX ORDER — NICOTINE POLACRILEX 4 MG
15 LOZENGE BUCCAL
Status: DISCONTINUED | OUTPATIENT
Start: 2021-03-04 | End: 2021-03-07 | Stop reason: HOSPADM

## 2021-03-04 RX ORDER — DEXTROSE MONOHYDRATE 25 G/50ML
25 INJECTION, SOLUTION INTRAVENOUS
Status: DISCONTINUED | OUTPATIENT
Start: 2021-03-04 | End: 2021-03-07 | Stop reason: HOSPADM

## 2021-03-04 RX ORDER — ALVIMOPAN 12 MG/1
12 CAPSULE ORAL 2 TIMES DAILY
Status: DISCONTINUED | OUTPATIENT
Start: 2021-03-04 | End: 2021-03-06

## 2021-03-04 RX ADMIN — HYDROMORPHONE HYDROCHLORIDE 0.5 MG: 1 INJECTION, SOLUTION INTRAMUSCULAR; INTRAVENOUS; SUBCUTANEOUS at 16:45

## 2021-03-04 RX ADMIN — SODIUM CHLORIDE, POTASSIUM CHLORIDE, SODIUM LACTATE AND CALCIUM CHLORIDE 600 ML: 600; 310; 30; 20 INJECTION, SOLUTION INTRAVENOUS at 15:27

## 2021-03-04 RX ADMIN — GABAPENTIN 100 MG: 100 CAPSULE ORAL at 22:12

## 2021-03-04 RX ADMIN — INSULIN HUMAN 3 UNITS: 100 INJECTION, SOLUTION PARENTERAL at 23:57

## 2021-03-04 RX ADMIN — CELECOXIB 100 MG: 100 CAPSULE ORAL at 22:13

## 2021-03-04 RX ADMIN — ALVIMOPAN 12 MG: 12 CAPSULE ORAL at 11:35

## 2021-03-04 RX ADMIN — ONDANSETRON 4 MG: 2 INJECTION INTRAMUSCULAR; INTRAVENOUS at 15:03

## 2021-03-04 RX ADMIN — ESMOLOL HYDROCHLORIDE 10 MG: 100 INJECTION, SOLUTION INTRAVENOUS at 12:20

## 2021-03-04 RX ADMIN — CEFOXITIN SODIUM 2 G: 2 POWDER, FOR SOLUTION INTRAVENOUS at 23:34

## 2021-03-04 RX ADMIN — GLYCOPYRROLATE 0.4 MG: 0.2 INJECTION INTRAMUSCULAR; INTRAVENOUS at 15:08

## 2021-03-04 RX ADMIN — LIDOCAINE HYDROCHLORIDE 60 MG: 20 INJECTION, SOLUTION INFILTRATION; PERINEURAL at 12:07

## 2021-03-04 RX ADMIN — EPHEDRINE SULFATE 10 MG: 50 INJECTION INTRAVENOUS at 13:37

## 2021-03-04 RX ADMIN — DEXAMETHASONE SODIUM PHOSPHATE 4 MG: 10 INJECTION INTRAMUSCULAR; INTRAVENOUS at 12:33

## 2021-03-04 RX ADMIN — SODIUM CHLORIDE, POTASSIUM CHLORIDE, SODIUM LACTATE AND CALCIUM CHLORIDE 9 ML/HR: 600; 310; 30; 20 INJECTION, SOLUTION INTRAVENOUS at 10:43

## 2021-03-04 RX ADMIN — NEOSTIGMINE METHYLSULFATE 3 MG: 1 INJECTION INTRAMUSCULAR; INTRAVENOUS; SUBCUTANEOUS at 15:08

## 2021-03-04 RX ADMIN — ROCURONIUM BROMIDE 10 MG: 50 INJECTION INTRAVENOUS at 14:10

## 2021-03-04 RX ADMIN — PHENYLEPHRINE HYDROCHLORIDE 200 MCG: 10 INJECTION INTRAVENOUS at 12:41

## 2021-03-04 RX ADMIN — INDOCYANINE GREEN AND WATER 7.5 MG: KIT at 14:14

## 2021-03-04 RX ADMIN — FENTANYL CITRATE 25 MCG: 50 INJECTION INTRAMUSCULAR; INTRAVENOUS at 15:11

## 2021-03-04 RX ADMIN — ROCURONIUM BROMIDE 10 MG: 50 INJECTION INTRAVENOUS at 13:26

## 2021-03-04 RX ADMIN — FENTANYL CITRATE 50 MCG: 50 INJECTION, SOLUTION INTRAMUSCULAR; INTRAVENOUS at 16:00

## 2021-03-04 RX ADMIN — EPHEDRINE SULFATE 10 MG: 50 INJECTION INTRAVENOUS at 14:41

## 2021-03-04 RX ADMIN — SODIUM CHLORIDE, POTASSIUM CHLORIDE, SODIUM LACTATE AND CALCIUM CHLORIDE 9 ML/HR: 600; 310; 30; 20 INJECTION, SOLUTION INTRAVENOUS at 11:26

## 2021-03-04 RX ADMIN — HYDROCODONE BITARTRATE AND ACETAMINOPHEN 1 TABLET: 5; 325 TABLET ORAL at 22:13

## 2021-03-04 RX ADMIN — POTASSIUM CHLORIDE AND SODIUM CHLORIDE 50 ML/HR: 900; 150 INJECTION, SOLUTION INTRAVENOUS at 21:59

## 2021-03-04 RX ADMIN — FENTANYL CITRATE 50 MCG: 50 INJECTION INTRAMUSCULAR; INTRAVENOUS at 12:14

## 2021-03-04 RX ADMIN — SODIUM CHLORIDE, POTASSIUM CHLORIDE, SODIUM LACTATE AND CALCIUM CHLORIDE 9 ML/HR: 600; 310; 30; 20 INJECTION, SOLUTION INTRAVENOUS at 17:11

## 2021-03-04 RX ADMIN — AMLODIPINE BESYLATE 10 MG: 10 TABLET ORAL at 22:13

## 2021-03-04 RX ADMIN — CEFOXITIN SODIUM 2 G: 2 POWDER, FOR SOLUTION INTRAVENOUS at 11:54

## 2021-03-04 RX ADMIN — FENTANYL CITRATE 25 MCG: 50 INJECTION INTRAMUSCULAR; INTRAVENOUS at 15:21

## 2021-03-04 RX ADMIN — CEFOXITIN SODIUM 2 G: 1 POWDER, FOR SOLUTION INTRAVENOUS at 17:25

## 2021-03-04 RX ADMIN — ACETAMINOPHEN 500 MG: 500 TABLET, FILM COATED ORAL at 23:57

## 2021-03-04 RX ADMIN — PROPOFOL 200 MG: 10 INJECTION, EMULSION INTRAVENOUS at 12:07

## 2021-03-04 RX ADMIN — MAGNESIUM SULFATE HEPTAHYDRATE 2 G: 500 INJECTION, SOLUTION INTRAMUSCULAR; INTRAVENOUS at 12:41

## 2021-03-04 RX ADMIN — FAMOTIDINE 20 MG: 10 INJECTION INTRAVENOUS at 11:11

## 2021-03-04 RX ADMIN — PHENYLEPHRINE HYDROCHLORIDE 200 MCG: 10 INJECTION INTRAVENOUS at 12:51

## 2021-03-04 RX ADMIN — HYDROCODONE BITARTRATE AND ACETAMINOPHEN 1 TABLET: 5; 325 TABLET ORAL at 18:06

## 2021-03-04 RX ADMIN — ROCURONIUM BROMIDE 40 MG: 50 INJECTION INTRAVENOUS at 12:07

## 2021-03-04 RX ADMIN — ALVIMOPAN 12 MG: 12 CAPSULE ORAL at 22:14

## 2021-03-04 RX ADMIN — PHENYLEPHRINE HYDROCHLORIDE 100 MCG: 10 INJECTION INTRAVENOUS at 12:36

## 2021-03-04 RX ADMIN — EPHEDRINE SULFATE 10 MG: 50 INJECTION INTRAVENOUS at 12:56

## 2021-03-04 RX ADMIN — ESMOLOL HYDROCHLORIDE 10 MG: 100 INJECTION, SOLUTION INTRAVENOUS at 12:25

## 2021-03-04 RX ADMIN — FENTANYL CITRATE 50 MCG: 50 INJECTION, SOLUTION INTRAMUSCULAR; INTRAVENOUS at 16:16

## 2021-03-04 RX ADMIN — ONDANSETRON 4 MG: 2 INJECTION INTRAMUSCULAR; INTRAVENOUS at 23:43

## 2021-03-04 RX ADMIN — METOPROLOL SUCCINATE 50 MG: 50 TABLET, EXTENDED RELEASE ORAL at 22:13

## 2021-03-04 NOTE — OP NOTE
Operative Note :   MD Gera Khanna SAQIB Pankaj  1935    Procedure Date: 03/04/21    Pre-op Diagnosis:  Colonic mass [K63.89]    Post-Op Diagnosis:  Same    Procedure:   · Laparoscopic sigmoid colectomy with da Cameron robot assistance  · Flexible sigmoidoscopy    Surgeon: Darek Adams MD    Assistant: Koffi Pimentel, critical for exposure, suctioning, irrigation, retraction, assistance with change of instruments and skin closure.    Anesthesia:  General (general endotracheal tube)    EBL:   50 mL    Specimens:   Sigmoid colon    Indications:  · 85-year-old gentleman with endoscopically unresectable mass in distal sigmoid.  Biopsies demonstrated tubulovillous adenoma, but there is suspicion that this could be upgraded to cancer.    Findings:   · Kathy ink tattooing was easily identified  · Floppy sigmoid allowed easy anastomosis in the pelvis    Recommendations:   · Routine post colectomy care    Description of procedure:    After obtaining informed consent, patient was brought to the operating room and placed under a general anesthetic.  Vences catheter was placed.  Access was gained to the abdominal cavity to the right of the umbilicus with a 5 mm trocar and direct access technique.  The abdomen was insufflated and initial inspection demonstrated no evidence of injury to underlying structures.  Additional trochars were then introduced, 12 mm trocar in the right lower quadrant, 8 mm trocar in the right upper quadrant and another 8 mm trocar in the subxiphoid position.  The initial 5 mm trocar was exchanged for an additional 8 mm robotic trocar and then the 5 mm trocar was placed far laterally in the right lower quadrant as an assistant trocar.  The da Cameron robot was then brought up and docked and then targeted appropriately.  The Kathy ink tattoo was easily identified in the distal sigmoid about 3 cm above the peritoneal reflection.  It was clear that there was extensive floppy sigmoid available to  sit down into the pelvis.  I then moved to the da Cameron Xi robotic console.  We started by flopping of the omentum out of the pelvis and over the transverse colon.  The small bowel was swept out of the pelvis.  A small amount of the terminal ileum had to be freed up from the retroperitoneum in order to allow it to fall out of the way.  The sigmoid colon was held up and the inferior mesenteric vessels were easily identified.  The peritoneum was scored here and the inferior mesenteric artery was dissected free and surrounded.  I then worked my way through the mesentery which was fairly thinned and was able to identify the left ureter.  I then continued my lateral dissection on the right side working from proximal to distal dividing the sigmoid colon mesentery until we got down below the peritoneal reflection and down to the level just beyond the tattoo.  Our lateral dissection was then completed and we worked to free up the white line of Toldt and allow the descending colon to sit down into the pelvis.  The left lateral dissection was then completed down into the pelvis using the vessel sealing device.  I then divided the distal sigmoid mesentery up to the level of the deep side of the distal sigmoid colon, skeletonizing this distal sigmoid completely.  The green loaded stapler was then introduced and 2 firings of the 45 mm sureform stapler was used to divide distally.  The specimen was then flipped into the abdominal cavity out of the pelvis.  We identified an area of the distal descending colon that would easily fall into the pelvis.  The mesentery was divided up to this point.  I then used the white loaded sureform 45 mm stapler to divide the inferior mesenteric vessels.  3 cc of ICG was injected and firefly was then used to detect the area of viable colon.  This was thought to be right at the point where we plan to divide the distal descending colon.  I then extended the 12 mm port site in the right lower quadrant  after undocking this arm and we introduced the anvil for the 28 mm EEA stapler.  An enterotomy was made in the sigmoid colon distal to the area of planned division.  A small enterotomy was made just above the area of planned division and the anvil was introduced and the spike was brought out through the small hole in the proximal portion of the colon.  The blue loaded sureform stapler was then used to divide the distal descending colon.  A 2-0 Vicryl suture was used to tighten up the area around the spike where it came out through the colon, ensuring good closure of this area around the spike.  The specimen was passed into the upper abdominal cavity.  Next the EEA sizers were introduced through the rectum and passed up to the end of the rectal staple line.  Next a 28 mm EEA stapler was brought up.  The spike was brought out just posterior to the staple line.  The anvil was attached to the spike and the stapler was closed and fired.  The stapler was removed.  We then filled the pelvis with fluid.  The proximal descending colon was occluded and I then introduced the flexible sigmoidoscope through the rectum and passed it up to the anastomosis.  The anastomosis was widely patent and the colon distended nicely.  No air bubbles were seen in the fluid-filled pelvis.  The mucosa was quite healthy on both sides of the staple line.  The colon was desufflated and the colonoscope was then withdrawn.  The fluid was suctioned out of the pelvis.  We ensured that there was no tension on our anastomosis and there was not.  The specimen was grasped.  The robot was undocked.  The 12 mm trocar site was extended for a few millimeters and we then extracted the specimen.  I could feel the mass in question it was clear that we had a reasonable margin on each side.  The robot was then undocked completely.  The 12 mm right lower quadrant extraction site was closed in 2 layers with 0 PDS suture, first closing the posterior rectus sheath and  then the anterior rectus sheath.  The muscle fibers had been split and not divided.  Skin incisions were then closed with 4-0 Monocryl.  Sterile dressings were applied.  The patient tolerated this well.    Darek Adams MD  General and Endoscopic Surgery  [facility]    [address], [address]  [place], [place], [address]  P: [phone]  F: [phone]

## 2021-03-04 NOTE — ANESTHESIA POSTPROCEDURE EVALUATION
"Patient: Gera Lira    Procedure Summary     Date: 03/04/21 Room / Location: [facility] OR  / [facility] MAIN OR    Anesthesia Start: 1158 Anesthesia Stop: 1549    Procedure: COLON RESECTION LAPAROSCOPIC SIGMOID WITH DAVINCI ROBOT, FLEXIBLE SIGMOIDOSCOPY (N/A Abdomen) Diagnosis:       Colonic mass      (Colonic mass [K63.89])    Surgeon: Darek Adams MD Provider: Juan Antonio Huang MD    Anesthesia Type: general ASA Status: 3          Anesthesia Type: general    Vitals  Vitals Value Taken Time   /55 03/04/21 1700   Temp 36.4 °C (97.6 °F) 03/04/21 1544   Pulse 63 03/04/21 1711   Resp 20 03/04/21 1700   SpO2 99 % 03/04/21 1711   Vitals shown include unvalidated device data.        Post Anesthesia Care and Evaluation    Patient location during evaluation: bedside  Patient participation: complete - patient participated  Level of consciousness: awake and alert  Pain score: 0  Pain management: adequate  Airway patency: patent  Anesthetic complications: No anesthetic complications  PONV Status: none  Cardiovascular status: acceptable and hemodynamically stable  Respiratory status: acceptable and spontaneous ventilation  Hydration status: acceptable    Comments: /55   Pulse 70   Temp 36.4 °C (97.6 °F) (Oral)   Resp 20   Ht 172.7 cm (68\")   Wt 66.8 kg (147 lb 4.3 oz)   SpO2 97%   BMI 22.39 kg/m²         "

## 2021-03-04 NOTE — ANESTHESIA PROCEDURE NOTES
Airway  Urgency: elective    Date/Time: 3/4/2021 12:11 PM  Airway not difficult    General Information and Staff    Patient location during procedure: OR  Anesthesiologist: Maria E Christine MD  CRNA: Judah Smallwood CRNA    Indications and Patient Condition  Indications for airway management: airway protection    Preoxygenated: yes  MILS maintained throughout  Mask difficulty assessment: 1 - vent by mask    Final Airway Details  Final airway type: endotracheal airway      Successful airway: ETT  Cuffed: yes   Successful intubation technique: direct laryngoscopy  Facilitating devices/methods: intubating stylet  Endotracheal tube insertion site: oral  Blade: Betty  Blade size: 4  ETT size (mm): 8.0  Cormack-Lehane Classification: grade IIa - partial view of glottis  Placement verified by: chest auscultation and capnometry   Cuff volume (mL): 7  Measured from: lips  ETT/EBT  to lips (cm): 22  Number of attempts at approach: 1  Assessment: lips, teeth, and gum same as pre-op and atraumatic intubation

## 2021-03-04 NOTE — PERIOPERATIVE NURSING NOTE
ATTEMPTED TO INSERT 16FR SILICONE CATHETER. IMMEDIATELY MET RESISTENCE. ATTEMPTED TO  [id number] FR LATEX CATHETER IMMEDIATELY MET RESISTENCE.  MONET KAUFFMAN RN SUCCESSFULY INSERTED 16FR COUDE TIP CATHETER.

## 2021-03-04 NOTE — PLAN OF CARE
Goal Outcome Evaluation:     Progress: no change  Outcome Summary: patient received from pacu s/p lap sigmoid resection with divinci. c/o lots of pain on arrival to floor. encouraged some po intake of water and crackers in order to safely admin po pain meds. norco given x1 dose. no trouble swallowing. resting well now. VSS. did not want insulin dose, but requested to wait for evening check. educated wife on the use of SS insulin with being treated in the hospital. will continue to monitor.

## 2021-03-04 NOTE — PERIOPERATIVE NURSING NOTE
Dr Adams at bedside. Informed pts HGB on 3/2/21 was 10.6 and pt reported his last stool was night was liquid brown. No orders received.

## 2021-03-04 NOTE — ANESTHESIA PREPROCEDURE EVALUATION
Anesthesia Evaluation     Patient summary reviewed and Nursing notes reviewed                Airway   Mallampati: II  TM distance: >3 FB  Neck ROM: full  No difficulty expected  Dental      Pulmonary    (+) a smoker Former,   Cardiovascular     ECG reviewed  PT is on anticoagulation therapy  Patient on routine beta blocker  Rhythm: regular  Rate: normal    (+) hypertension, CAD, CABG, angina, hyperlipidemia,  carotid artery disease      Neuro/Psych- negative ROS  GI/Hepatic/Renal/Endo    (+)  GI bleeding , renal disease CRI, diabetes mellitus,     Musculoskeletal (-) negative ROS    Abdominal    Substance History - negative use     OB/GYN negative ob/gyn ROS         Other                        Anesthesia Plan    ASA 3     general     intravenous induction     Anesthetic plan, all risks, benefits, and alternatives have been provided, discussed and informed consent has been obtained with: patient.

## 2021-03-05 LAB
ANION GAP SERPL CALCULATED.3IONS-SCNC: 12.7 MMOL/L (ref 5–15)
BASOPHILS # BLD AUTO: 0.02 10*3/MM3 (ref 0–0.2)
BASOPHILS NFR BLD AUTO: 0.2 % (ref 0–1.5)
BUN SERPL-MCNC: 15 MG/DL (ref 8–23)
BUN/CREAT SERPL: 9.9 (ref 7–25)
CALCIUM SPEC-SCNC: 8.5 MG/DL (ref 8.6–10.5)
CHLORIDE SERPL-SCNC: 106 MMOL/L (ref 98–107)
CO2 SERPL-SCNC: 22.3 MMOL/L (ref 22–29)
CREAT SERPL-MCNC: 1.51 MG/DL (ref 0.76–1.27)
DEPRECATED RDW RBC AUTO: 36.9 FL (ref 37–54)
EOSINOPHIL # BLD AUTO: 0 10*3/MM3 (ref 0–0.4)
EOSINOPHIL NFR BLD AUTO: 0 % (ref 0.3–6.2)
ERYTHROCYTE [DISTWIDTH] IN BLOOD BY AUTOMATED COUNT: 13 % (ref 12.3–15.4)
GFR SERPL CREATININE-BSD FRML MDRD: 53 ML/MIN/1.73
GLUCOSE BLDC GLUCOMTR-MCNC: 133 MG/DL (ref 70–130)
GLUCOSE BLDC GLUCOMTR-MCNC: 179 MG/DL (ref 70–130)
GLUCOSE BLDC GLUCOMTR-MCNC: 203 MG/DL (ref 70–130)
GLUCOSE BLDC GLUCOMTR-MCNC: 208 MG/DL (ref 70–130)
GLUCOSE BLDC GLUCOMTR-MCNC: 235 MG/DL (ref 70–130)
GLUCOSE SERPL-MCNC: 191 MG/DL (ref 65–99)
HCT VFR BLD AUTO: 28.8 % (ref 37.5–51)
HGB BLD-MCNC: 8.7 G/DL (ref 13–17.7)
IMM GRANULOCYTES # BLD AUTO: 0.09 10*3/MM3 (ref 0–0.05)
IMM GRANULOCYTES NFR BLD AUTO: 0.7 % (ref 0–0.5)
LYMPHOCYTES # BLD AUTO: 0.79 10*3/MM3 (ref 0.7–3.1)
LYMPHOCYTES NFR BLD AUTO: 6.5 % (ref 19.6–45.3)
MCH RBC QN AUTO: 23.8 PG (ref 26.6–33)
MCHC RBC AUTO-ENTMCNC: 30.2 G/DL (ref 31.5–35.7)
MCV RBC AUTO: 78.9 FL (ref 79–97)
MONOCYTES # BLD AUTO: 0.86 10*3/MM3 (ref 0.1–0.9)
MONOCYTES NFR BLD AUTO: 7 % (ref 5–12)
NEUTROPHILS NFR BLD AUTO: 10.46 10*3/MM3 (ref 1.7–7)
NEUTROPHILS NFR BLD AUTO: 85.6 % (ref 42.7–76)
NRBC BLD AUTO-RTO: 0 /100 WBC (ref 0–0.2)
PLATELET # BLD AUTO: 236 10*3/MM3 (ref 140–450)
PMV BLD AUTO: 11.1 FL (ref 6–12)
POTASSIUM SERPL-SCNC: 4.2 MMOL/L (ref 3.5–5.2)
RBC # BLD AUTO: 3.65 10*6/MM3 (ref 4.14–5.8)
SODIUM SERPL-SCNC: 141 MMOL/L (ref 136–145)
WBC # BLD AUTO: 12.22 10*3/MM3 (ref 3.4–10.8)

## 2021-03-05 PROCEDURE — 80048 BASIC METABOLIC PNL TOTAL CA: CPT | Performed by: SURGERY

## 2021-03-05 PROCEDURE — 85025 COMPLETE CBC W/AUTO DIFF WBC: CPT | Performed by: SURGERY

## 2021-03-05 PROCEDURE — 87040 BLOOD CULTURE FOR BACTERIA: CPT | Performed by: SURGERY

## 2021-03-05 PROCEDURE — 25010000002 ENOXAPARIN PER 10 MG: Performed by: SURGERY

## 2021-03-05 PROCEDURE — 99024 POSTOP FOLLOW-UP VISIT: CPT | Performed by: SURGERY

## 2021-03-05 PROCEDURE — 63710000001 INSULIN REGULAR HUMAN PER 5 UNITS: Performed by: SURGERY

## 2021-03-05 PROCEDURE — 82962 GLUCOSE BLOOD TEST: CPT

## 2021-03-05 PROCEDURE — 25010000002 CEFOXITIN PER 1 G: Performed by: SURGERY

## 2021-03-05 PROCEDURE — 25810000003 SODIUM CHLORIDE 0.9 % WITH KCL 20 MEQ 20-0.9 MEQ/L-% SOLUTION: Performed by: SURGERY

## 2021-03-05 RX ADMIN — CEFOXITIN SODIUM 2 G: 2 POWDER, FOR SOLUTION INTRAVENOUS at 06:38

## 2021-03-05 RX ADMIN — ALVIMOPAN 12 MG: 12 CAPSULE ORAL at 08:37

## 2021-03-05 RX ADMIN — METOPROLOL SUCCINATE 50 MG: 50 TABLET, EXTENDED RELEASE ORAL at 21:27

## 2021-03-05 RX ADMIN — GABAPENTIN 100 MG: 100 CAPSULE ORAL at 13:00

## 2021-03-05 RX ADMIN — ENOXAPARIN SODIUM 40 MG: 40 INJECTION SUBCUTANEOUS at 08:37

## 2021-03-05 RX ADMIN — INSULIN HUMAN 2 UNITS: 100 INJECTION, SOLUTION PARENTERAL at 06:25

## 2021-03-05 RX ADMIN — LINAGLIPTIN 5 MG: 5 TABLET, FILM COATED ORAL at 21:27

## 2021-03-05 RX ADMIN — GABAPENTIN 100 MG: 100 CAPSULE ORAL at 06:41

## 2021-03-05 RX ADMIN — ALVIMOPAN 12 MG: 12 CAPSULE ORAL at 21:27

## 2021-03-05 RX ADMIN — ACETAMINOPHEN 500 MG: 500 TABLET, FILM COATED ORAL at 12:59

## 2021-03-05 RX ADMIN — POTASSIUM CHLORIDE AND SODIUM CHLORIDE 75 ML/HR: 900; 150 INJECTION, SOLUTION INTRAVENOUS at 16:51

## 2021-03-05 RX ADMIN — ACETAMINOPHEN 500 MG: 500 TABLET, FILM COATED ORAL at 19:54

## 2021-03-05 RX ADMIN — LISINOPRIL 20 MG: 20 TABLET ORAL at 08:36

## 2021-03-05 RX ADMIN — AMLODIPINE BESYLATE 10 MG: 10 TABLET ORAL at 21:27

## 2021-03-05 RX ADMIN — HYDROCODONE BITARTRATE AND ACETAMINOPHEN 1 TABLET: 5; 325 TABLET ORAL at 16:48

## 2021-03-05 RX ADMIN — INSULIN HUMAN 3 UNITS: 100 INJECTION, SOLUTION PARENTERAL at 13:00

## 2021-03-05 RX ADMIN — CELECOXIB 100 MG: 100 CAPSULE ORAL at 08:40

## 2021-03-05 RX ADMIN — ACETAMINOPHEN 500 MG: 500 TABLET, FILM COATED ORAL at 23:45

## 2021-03-05 RX ADMIN — CELECOXIB 100 MG: 100 CAPSULE ORAL at 21:27

## 2021-03-05 RX ADMIN — ACETAMINOPHEN 500 MG: 500 TABLET, FILM COATED ORAL at 06:25

## 2021-03-05 RX ADMIN — GABAPENTIN 100 MG: 100 CAPSULE ORAL at 21:27

## 2021-03-05 NOTE — PLAN OF CARE
Goal Outcome Evaluation:           Pt A/Ox4, took pt off nasal canula and is on room air. Pt encouraged to ambulate today.

## 2021-03-05 NOTE — PROGRESS NOTES
Postop day #1 robotic sigmoid resection    Subjective:  No complaints.  Tolerated full liquids.  Pain control adequate.    Objective:  Low-grade fever of 100.8 overnight, currently resolved.  Heart rate 60s to 70s blood pressure 121/65  General: Awake and alert, appears comfortable  Abdomen: Soft and benign, incisions are in good order    Labs are reviewed.  White blood cell count 12.2, hemoglobin 8.7 from 10.6 preop.  Chemistries okay although creatinine up slightly at 1.5 from 1.3 preop.    Assessment and plan:  -Postop day 1 robotic sigmoid resection, doing well to this point  -Advance to GI soft diet today  -Watch hemoglobin, he is on Lovenox for DVT prophylaxis, no signs of any significant bleeding at this time  -Mild elevation of creatinine, recheck tomorrow, I did increase his IV fluids a little bit  -Mobilize patient  -Type 2 diabetes, on sliding scale, will add back some of his oral medications.    Darek Adams MD  General and Endoscopic Surgery  [facility]    [address], [address]  [place], [place], [address]  P: [phone]  F: [phone]

## 2021-03-05 NOTE — PROGRESS NOTES
Discharge Planning Assessment  [facility]     Patient Name: Gera Lira  MRN: [id number]  Today's Date: 3/5/2021    Admit Date: 3/4/2021    Discharge Needs Assessment     Row Name 03/05/21 9780       Living Environment    Lives With  child(keke), adult;spouse    Current Living Arrangements  home/apartment/condo    Potentially Unsafe Housing Conditions  other (see comments)    Primary Care Provided by  self    Provides Primary Care For  no one    Family Caregiver if Needed  spouse    Quality of Family Relationships  helpful;involved;supportive    Able to Return to Prior Arrangements  yes       Resource/Environmental Concerns    Resource/Environmental Concerns  none    Home Accessibility Concerns  stairs to enter home    Transportation Concerns  car, none       Transition Planning    Patient/Family Anticipates Transition to  home    Patient/Family Anticipated Services at Transition  none    Transportation Anticipated  family or friend will provide       Discharge Needs Assessment    Readmission Within the Last 30 Days  no previous admission in last 30 days    Equipment Currently Used at Home  cane, straight    Concerns to be Addressed  no discharge needs identified;denies needs/concerns at this time    Anticipated Changes Related to Illness  none    Equipment Needed After Discharge  none        Discharge Plan     Row Name 03/05/21 2242       Plan    Plan  Home with spouse    Patient/Family in Agreement with Plan  yes    Plan Comments  CCP met with patient and patient’s spouse at bedside. CCP role explained and discharge planning discussed. Face sheet verified and IMM noted. Patient’s PCP is Dr. Pro. Patient lives with his spouse, with 5 steps to the entrance of his home. Patient’s bedroom and bathroom are on the main level. Patient is independent with his ADLs but does have access to cane and scooter at home. Patient has not used home health but has been to SNF. Patient could not recall which facility.  Patient and patient’s spouse do not anticipate needing home health/SNF. Patient plans to return home with spouse. Cara SHELL        Continued Care and Services - Admitted Since 3/4/2021    Coordination has not been started for this encounter.         Demographic Summary     Row Name 03/05/21 1628       General Information    Admission Type  inpatient    Arrived From  emergency department    Referral Source  admission list    Reason for Consult  discharge planning    Preferred Language  English     Used During This Interaction  no        Functional Status     Row Name 03/05/21 1628       Functional Status    Usual Activity Tolerance  good    Current Activity Tolerance  good       Functional Status, IADL    Medications  independent    Meal Preparation  independent    Housekeeping  independent    Laundry  independent    Shopping  independent       Mental Status    General Appearance WDL  WDL       Mental Status Summary    Recent Changes in Mental Status/Cognitive Functioning  no changes        Psychosocial    No documentation.       Abuse/Neglect    No documentation.       Legal    No documentation.       Substance Abuse    No documentation.       Patient Forms    No documentation.           SUMAYA Edmondson

## 2021-03-05 NOTE — PLAN OF CARE
Goal Outcome Evaluation:  Plan of Care Reviewed With: patient     Outcome Summary: Pt AOx4 on 2L NC. Pt c/o pain and nausea. Norco given x1. Zofran given x1.  so 3 units of insulin given. Pt's temp has slowly increased to 100.8. Placed call to MD who ordered blood cultures. Other VS are stable. Will continue to closely monitor.

## 2021-03-06 ENCOUNTER — APPOINTMENT (OUTPATIENT)
Dept: GENERAL RADIOLOGY | Facility: HOSPITAL | Age: 86
End: 2021-03-06

## 2021-03-06 LAB
ANION GAP SERPL CALCULATED.3IONS-SCNC: 6.5 MMOL/L (ref 5–15)
BASOPHILS # BLD AUTO: 0.01 10*3/MM3 (ref 0–0.2)
BASOPHILS NFR BLD AUTO: 0.1 % (ref 0–1.5)
BUN SERPL-MCNC: 13 MG/DL (ref 8–23)
BUN/CREAT SERPL: 9.4 (ref 7–25)
CALCIUM SPEC-SCNC: 8.1 MG/DL (ref 8.6–10.5)
CHLORIDE SERPL-SCNC: 108 MMOL/L (ref 98–107)
CO2 SERPL-SCNC: 27.5 MMOL/L (ref 22–29)
CREAT SERPL-MCNC: 1.38 MG/DL (ref 0.76–1.27)
DEPRECATED RDW RBC AUTO: 35.1 FL (ref 37–54)
EOSINOPHIL # BLD AUTO: 0.11 10*3/MM3 (ref 0–0.4)
EOSINOPHIL NFR BLD AUTO: 1.2 % (ref 0.3–6.2)
ERYTHROCYTE [DISTWIDTH] IN BLOOD BY AUTOMATED COUNT: 12.6 % (ref 12.3–15.4)
GFR SERPL CREATININE-BSD FRML MDRD: 59 ML/MIN/1.73
GLUCOSE BLDC GLUCOMTR-MCNC: 157 MG/DL (ref 70–130)
GLUCOSE BLDC GLUCOMTR-MCNC: 164 MG/DL (ref 70–130)
GLUCOSE BLDC GLUCOMTR-MCNC: 220 MG/DL (ref 70–130)
GLUCOSE BLDC GLUCOMTR-MCNC: 97 MG/DL (ref 70–130)
GLUCOSE SERPL-MCNC: 166 MG/DL (ref 65–99)
HCT VFR BLD AUTO: 27.9 % (ref 37.5–51)
HGB BLD-MCNC: 8.6 G/DL (ref 13–17.7)
IMM GRANULOCYTES # BLD AUTO: 0.04 10*3/MM3 (ref 0–0.05)
IMM GRANULOCYTES NFR BLD AUTO: 0.4 % (ref 0–0.5)
LYMPHOCYTES # BLD AUTO: 0.96 10*3/MM3 (ref 0.7–3.1)
LYMPHOCYTES NFR BLD AUTO: 10.1 % (ref 19.6–45.3)
MCH RBC QN AUTO: 24.4 PG (ref 26.6–33)
MCHC RBC AUTO-ENTMCNC: 30.8 G/DL (ref 31.5–35.7)
MCV RBC AUTO: 79 FL (ref 79–97)
MONOCYTES # BLD AUTO: 0.57 10*3/MM3 (ref 0.1–0.9)
MONOCYTES NFR BLD AUTO: 6 % (ref 5–12)
NEUTROPHILS NFR BLD AUTO: 7.86 10*3/MM3 (ref 1.7–7)
NEUTROPHILS NFR BLD AUTO: 82.2 % (ref 42.7–76)
NRBC BLD AUTO-RTO: 0 /100 WBC (ref 0–0.2)
PLATELET # BLD AUTO: 199 10*3/MM3 (ref 140–450)
PMV BLD AUTO: 11.3 FL (ref 6–12)
POTASSIUM SERPL-SCNC: 4.3 MMOL/L (ref 3.5–5.2)
RBC # BLD AUTO: 3.53 10*6/MM3 (ref 4.14–5.8)
SODIUM SERPL-SCNC: 142 MMOL/L (ref 136–145)
WBC # BLD AUTO: 9.55 10*3/MM3 (ref 3.4–10.8)

## 2021-03-06 PROCEDURE — 80048 BASIC METABOLIC PNL TOTAL CA: CPT | Performed by: SURGERY

## 2021-03-06 PROCEDURE — 99024 POSTOP FOLLOW-UP VISIT: CPT | Performed by: SURGERY

## 2021-03-06 PROCEDURE — 63710000001 INSULIN REGULAR HUMAN PER 5 UNITS: Performed by: SURGERY

## 2021-03-06 PROCEDURE — 25810000003 SODIUM CHLORIDE 0.9 % WITH KCL 20 MEQ 20-0.9 MEQ/L-% SOLUTION: Performed by: SURGERY

## 2021-03-06 PROCEDURE — 74018 RADEX ABDOMEN 1 VIEW: CPT

## 2021-03-06 PROCEDURE — 82962 GLUCOSE BLOOD TEST: CPT

## 2021-03-06 PROCEDURE — 85025 COMPLETE CBC W/AUTO DIFF WBC: CPT | Performed by: SURGERY

## 2021-03-06 PROCEDURE — 25010000002 ENOXAPARIN PER 10 MG: Performed by: SURGERY

## 2021-03-06 RX ADMIN — ALVIMOPAN 12 MG: 12 CAPSULE ORAL at 08:49

## 2021-03-06 RX ADMIN — INSULIN HUMAN 2 UNITS: 100 INJECTION, SOLUTION PARENTERAL at 08:49

## 2021-03-06 RX ADMIN — CELECOXIB 100 MG: 100 CAPSULE ORAL at 20:43

## 2021-03-06 RX ADMIN — ENOXAPARIN SODIUM 40 MG: 40 INJECTION SUBCUTANEOUS at 08:50

## 2021-03-06 RX ADMIN — ACETAMINOPHEN 500 MG: 500 TABLET, FILM COATED ORAL at 23:47

## 2021-03-06 RX ADMIN — LISINOPRIL 20 MG: 20 TABLET ORAL at 08:50

## 2021-03-06 RX ADMIN — CELECOXIB 100 MG: 100 CAPSULE ORAL at 08:50

## 2021-03-06 RX ADMIN — METOPROLOL SUCCINATE 50 MG: 50 TABLET, EXTENDED RELEASE ORAL at 20:43

## 2021-03-06 RX ADMIN — POTASSIUM CHLORIDE AND SODIUM CHLORIDE 75 ML/HR: 900; 150 INJECTION, SOLUTION INTRAVENOUS at 06:05

## 2021-03-06 RX ADMIN — INSULIN HUMAN 3 UNITS: 100 INJECTION, SOLUTION PARENTERAL at 12:24

## 2021-03-06 RX ADMIN — ACETAMINOPHEN 500 MG: 500 TABLET, FILM COATED ORAL at 06:03

## 2021-03-06 RX ADMIN — GABAPENTIN 100 MG: 100 CAPSULE ORAL at 06:04

## 2021-03-06 RX ADMIN — GABAPENTIN 100 MG: 100 CAPSULE ORAL at 21:23

## 2021-03-06 RX ADMIN — HYDROCODONE BITARTRATE AND ACETAMINOPHEN 1 TABLET: 5; 325 TABLET ORAL at 08:55

## 2021-03-06 RX ADMIN — GABAPENTIN 100 MG: 100 CAPSULE ORAL at 13:56

## 2021-03-06 RX ADMIN — AMLODIPINE BESYLATE 10 MG: 10 TABLET ORAL at 20:43

## 2021-03-06 RX ADMIN — ACETAMINOPHEN 500 MG: 500 TABLET, FILM COATED ORAL at 12:24

## 2021-03-06 RX ADMIN — LINAGLIPTIN 5 MG: 5 TABLET, FILM COATED ORAL at 20:43

## 2021-03-06 NOTE — NURSING NOTE
Dr earl notified of pts abdomen distention and increased pain. KUB to be performed to rule out pot op ileus.

## 2021-03-06 NOTE — PLAN OF CARE
Goal Outcome Evaluation:  Plan of Care Reviewed With: patient  Progress: improving  Outcome Summary: Received pt in bed. Alert and orientedx4. [place]. NAD. No c/o pain. No c/o nausea. Encouraged coughing and deep breathing. Tolerating 2LNC. Slept throughout most of the shift. MIVF continues. Voiding per urinal.Will CTM.

## 2021-03-06 NOTE — PROGRESS NOTES
"General Surgery  Progress Note    CC: Follow-up unresectable tubulovillous adenoma    POD#2 robotic sigmoid colectomy        S: Nursing staff noted that his abdomen looked markedly distended this morning.  He denies any nausea or vomiting.  I obtained a KUB, which demonstrates a mild colonic ileus.  Since that time he has passed flatus and also just had a bowel movement this morning.    O:/67 (BP Location: Right arm, Patient Position: Lying)   Pulse 73   Temp 98.4 °F (36.9 °C) (Skin)   Resp 18   Ht 172.7 cm (68\")   Wt 66.8 kg (147 lb 4.3 oz)   SpO2 90%   BMI 22.39 kg/m²     Intake & Output:  UOP: 575 mL/24 hrs  BM x1 this morning    GENERAL: alert, well appearing, and in no distress  HEENT: normocephalic, atraumatic, moist mucous membranes, clear sclerae   CHEST: clear to auscultation, no wheezes, rales or rhonchi, symmetric air entry  CARDIAC: regular rate and rhythm    ABDOMEN: Soft, mild distention, minimal tenderness, incisions clean/dry/intact  EXTREMITIES: no cyanosis, clubbing, or edema   SKIN: Warm and moist, no rashes    LABS  Results from last 7 days   Lab Units 03/06/21  0810 03/05/21  0556 03/02/21  1219   WBC 10*3/mm3 9.55 12.22* 6.37   HEMOGLOBIN g/dL 8.6* 8.7* 10.6*   HEMATOCRIT % 27.9* 28.8* 37.6   PLATELETS 10*3/mm3 199 236 271     Results from last 7 days   Lab Units 03/06/21  0810 03/05/21  0556 03/02/21  1219   SODIUM mmol/L 142 141 142   POTASSIUM mmol/L 4.3 4.2 4.3   CHLORIDE mmol/L 108* 106 103   CO2 mmol/L 27.5 22.3 30.2*   BUN mg/dL 13 15 17   CREATININE mg/dL 1.38* 1.51* 1.33*   CALCIUM mg/dL 8.1* 8.5* 9.3   GLUCOSE mg/dL 166* 191* 178*             A/P: 85 y.o. male POD#2 robotic sigmoid colectomy    Continue regular diet as tolerated.  Stop IV fluids.  I reviewed the KUB that was done this morning which does demonstrate some distention of the cecum with gas, but since the KUB was done he has had a bowel movement and is passing flatus.  I think he might be ready for discharge " home as early as tomorrow as long as he has a bit more bowel function to allow for improvement in his abdominal distention.    Maggy Madera MD  General and Endoscopic Surgery  [facility]    [address], [address]  [place], [place], [address]  P: [phone]  F: [phone]

## 2021-03-06 NOTE — NURSING NOTE
RN educated pt about importance of ambulating and getting out of bed. Pt was able to stand up with assist x2, voided into the urinal, and couldn't tolerate ambulation at this time. IS performed and pt tolerated fairly.

## 2021-03-06 NOTE — PLAN OF CARE
Goal Outcome Evaluation:           PT abdomen appeared more rounded and was firm upon palpation since yesterday. KUB was ordered this am which demonstrated distention of the cecum with gas but since then has passed flatus and has several small bowel movements. Pt did receive Norco once today but pain is improving along with ambulation. Pt ambulated a lap around the unit with x1 assist. IS was encouraged and used today.

## 2021-03-07 ENCOUNTER — READMISSION MANAGEMENT (OUTPATIENT)
Dept: CALL CENTER | Facility: HOSPITAL | Age: 86
End: 2021-03-07

## 2021-03-07 VITALS
SYSTOLIC BLOOD PRESSURE: 165 MMHG | HEART RATE: 67 BPM | DIASTOLIC BLOOD PRESSURE: 79 MMHG | BODY MASS INDEX: 22.32 KG/M2 | HEIGHT: 68 IN | RESPIRATION RATE: 16 BRPM | TEMPERATURE: 97.5 F | WEIGHT: 147.27 LBS | OXYGEN SATURATION: 92 %

## 2021-03-07 DIAGNOSIS — Z90.49 S/P COLON RESECTION: Primary | ICD-10-CM

## 2021-03-07 LAB
GLUCOSE BLDC GLUCOMTR-MCNC: 141 MG/DL (ref 70–130)
GLUCOSE BLDC GLUCOMTR-MCNC: 168 MG/DL (ref 70–130)

## 2021-03-07 PROCEDURE — 25010000002 ENOXAPARIN PER 10 MG: Performed by: SURGERY

## 2021-03-07 PROCEDURE — 82962 GLUCOSE BLOOD TEST: CPT

## 2021-03-07 PROCEDURE — 99024 POSTOP FOLLOW-UP VISIT: CPT | Performed by: SURGERY

## 2021-03-07 RX ORDER — HYDROCODONE BITARTRATE AND ACETAMINOPHEN 5; 325 MG/1; MG/1
1 TABLET ORAL EVERY 4 HOURS PRN
Qty: 20 TABLET | Refills: 0 | Status: SHIPPED | OUTPATIENT
Start: 2021-03-07 | End: 2021-03-07

## 2021-03-07 RX ORDER — HYDROCODONE BITARTRATE AND ACETAMINOPHEN 5; 325 MG/1; MG/1
1 TABLET ORAL EVERY 6 HOURS PRN
Qty: 20 TABLET | Refills: 0 | Status: SHIPPED | OUTPATIENT
Start: 2021-03-07 | End: 2021-03-22

## 2021-03-07 RX ADMIN — GABAPENTIN 100 MG: 100 CAPSULE ORAL at 06:15

## 2021-03-07 RX ADMIN — ACETAMINOPHEN 500 MG: 500 TABLET, FILM COATED ORAL at 06:15

## 2021-03-07 RX ADMIN — CELECOXIB 100 MG: 100 CAPSULE ORAL at 08:44

## 2021-03-07 RX ADMIN — LISINOPRIL 20 MG: 20 TABLET ORAL at 08:43

## 2021-03-07 RX ADMIN — ENOXAPARIN SODIUM 40 MG: 40 INJECTION SUBCUTANEOUS at 08:44

## 2021-03-07 NOTE — PLAN OF CARE
Goal Outcome Evaluation:  Plan of Care Reviewed With: patient  Progress: no change  Outcome Summary: No events overnight. Pt slept. No c/o pain. Abd distended, +BS. Voiding in urinal. Will CTM.

## 2021-03-07 NOTE — DISCHARGE SUMMARY
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 male that was admitted to the hospital with a recently identified unresectable tubulovillous adenoma of the sigmoid colon found on colonoscopy.  He was scheduled for elective laparoscopic robotic assisted sigmoid colectomy with Dr. Adams.    Hospital course: He was brought in electively and underwent a robotic sigmoid colectomy.  He was admitted postoperatively for observation and his diet gradually advanced.  He developed a mild postoperative ileus with abdominal distention noted on postoperative day #2 but very quickly had resumption of bowel function thereafter.  He was tolerating a regular diet, having nonbloody stools, and up ambulating independently.  He is being discharged home on postoperative day #3 with instructions to follow-up in 2 weeks.    Discharge medications:      Discharge Medications      New Medications      Instructions Start Date   HYDROcodone-acetaminophen 5-325 MG per tablet  Commonly known as: NORCO   1 tablet, Oral, Every 4 Hours PRN         Changes to Medications      Instructions Start Date   amLODIPine 10 MG tablet  Commonly known as: NORVASC  What changed: when to take this   10 mg, Oral, Daily      metoprolol succinate XL 50 MG 24 hr tablet  Commonly known as: TOPROL-XL  What changed: when to take this   TAKE 1 TABLET BY MOUTH EVERY DAY      Tradjenta 5 MG tablet tablet  Generic drug: linagliptin  What changed:   · how much to  take  · when to take this   TAKE 1 TABLET BY MOUTH DAILY         Continue These Medications      Instructions Start Date   albuterol sulfate  (90 Base) MCG/ACT inhaler  Commonly known as: PROVENTIL HFA;VENTOLIN HFA;PROAIR HFA   INHALE 2 PUFFS BY MOUTH EVERY 4 HOURS AS NEEDED      aspirin 81 MG tablet   81 mg, Oral, Daily, PT STATES HAS NOT GONE BACK ON ASPIRIN D/T PREVIOUS PROCEDURES AND HAS JUST STAYED OFF      ezetimibe 10 MG tablet  Commonly known as: ZETIA   10 mg, Oral, Daily      fish oil 1200 MG capsule capsule   1,200 mg, Oral, Nightly, HELD FOR OR      glimepiride 2 MG tablet  Commonly known as: AMARYL   2 mg, Oral, Every Morning Before Breakfast      glucose blood test strip   Test once daily      lisinopril 20 MG tablet  Commonly known as: PRINIVIL,ZESTRIL   20 mg, Oral, Daily      Livalo 2 MG tablet tablet  Generic drug: pitavastatin calcium   2 mg, Oral, Nightly         Stop These Medications    Chlorhexidine Gluconate Cloth 2 % pads            Discharge instructions:    - Resume regular diet as tolerated  - No lifting anything heavier than 15 pounds for 6 weeks postop minimum to prevent hernia recurrence. You may resume all activities as tolerated once your pain level allows.  - No driving while taking narcotic pain medications.  - You may resume showering, no tub bathing for two weeks while your incisions heal.  - Wash your incisions with soap and water daily in the shower, pat dry, and leave open to air.    Follow-up appointment: Follow up with Darek Adams MD in the office in 2 weeks. Call for appointment at [phone].    CODE STATUS: Full code

## 2021-03-07 NOTE — OUTREACH NOTE
Prep Survey      Responses   [facility] patient discharged from?  [place]   Is LACE score < 7 ?  No   Emergency Room discharge w/ pulse ox?  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  General Surgery   Does the patient have Home health ordered?  No   Is there a DME ordered?  No   Medication alerts for this patient  [place]   Prep survey completed?  Yes          Janett George RN

## 2021-03-08 ENCOUNTER — TRANSITIONAL CARE MANAGEMENT TELEPHONE ENCOUNTER (OUTPATIENT)
Dept: CALL CENTER | Facility: HOSPITAL | Age: 86
End: 2021-03-08

## 2021-03-08 LAB
CYTO UR: NORMAL
LAB AP CASE REPORT: NORMAL
LAB AP DIAGNOSIS COMMENT: NORMAL
PATH REPORT.FINAL DX SPEC: NORMAL
PATH REPORT.GROSS SPEC: NORMAL

## 2021-03-08 NOTE — PROGRESS NOTES
Case Management Discharge Note      Final Note: Home--no needs         Selected Continued Care - Discharged on 3/7/2021 Admission date: 3/4/2021 - Discharge disposition: Home or Self Care    Destination    No services have been selected for the patient.              Durable Medical Equipment    No services have been selected for the patient.              Dialysis/Infusion    No services have been selected for the patient.              Home Medical Care    No services have been selected for the patient.              Therapy    No services have been selected for the patient.              Community Resources    No services have been selected for the patient.                       Final Discharge Disposition Code: 01 - home or self-care

## 2021-03-08 NOTE — OUTREACH NOTE
Call Center TCM Note      Responses   [facility] patient discharged from?  [place]   Does the patient have one of the following disease processes/diagnoses(primary or secondary)?  General Surgery   TCM attempt successful?  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  Yes   List who call center can speak with  JAZMÍN SERNA   Person spoke with today (if not patient) and relationship  JAZMÍN SERNA- WIFE   Meds reviewed with patient/caregiver?  Yes   Is the patient having any side effects they believe may be caused by any medication additions or changes?  No   Does the patient have all medications related to this admission filled (includes all antibiotics, pain medications, etc.)  Yes   Is the patient taking all medications as directed (includes completed medication regime)?  Yes   Does the patient have a follow up appointment scheduled with their surgeon?  No   What is preventing the patient from scheduling follow up appointments?  Haven't had time   Nursing Interventions  Educated patient on importance of making appointment, Advised patient to make appointment   Urgent appointment interventions  Facilitated patient appointment   Has the patient kept scheduled appointments due by today?  N/A   Comments  THIS NURSE SCHEDULED FOLLOW UP APPOINTMENTS WITH SURGEON AND PCP DURING THIS CALL. CALL BACK TO WIFE TO RELAY DATES AND TIMES OF THESE APPOINTMENTS.    Has home health visited the patient within 72 hours of discharge?  N/A   Psychosocial issues?  No   Did the patient receive a copy of their discharge instructions?  Yes   Nursing interventions  Reviewed instructions with patient   What is the patient's perception of their health status since discharge?  Improving   Nursing interventions  Nurse provided patient  education   Is the patient /caregiver able to teach back basic post-op care?  Continue use of incentive spirometry at least 1 week post discharge, Practice 'cough and deep breath', Drive as instructed by MD in discharge instructions, Take showers only when approved by MD-sponge bathe until then, No tub bath, swimming, or hot tub until instructed by MD, Keep incision areas clean,dry and protected, Do not remove steri-strips, Lifting as instructed by MD in discharge instructions   Is the patient/caregiver able to teach back signs and symptoms of incisional infection?  Increased redness, swelling or pain at the incisonal site, Increased drainage or bleeding, Incisional warmth, Pus or odor from incision, Fever   Is the patient/caregiver able to teach back steps to recovery at home?  Set small, achievable goals for return to baseline health, Rest and rebuild strength, gradually increase activity, Practice good oral hygiene, Eat a well-balance diet, Make a list of questions for surgeon's appointment   If the patient is a current smoker, are they able to teach back resources for cessation?  Not a smoker   Is the patient/caregiver able to teach back the hierarchy of who to call/visit for symptoms/problems? PCP, Specialist, Home health nurse, Urgent Care, ED, 911  Yes   TCM call completed?  Yes          Radha Solares LPN    3/8/2021, 14:55 EST

## 2021-03-10 ENCOUNTER — DOCUMENTATION (OUTPATIENT)
Dept: SURGERY | Facility: CLINIC | Age: 86
End: 2021-03-10

## 2021-03-10 LAB
BACTERIA SPEC AEROBE CULT: NORMAL
BACTERIA SPEC AEROBE CULT: NORMAL

## 2021-03-10 NOTE — PROGRESS NOTES
Called and spoke with the patient and his wife regarding pathology findings.  Pathology demonstrates only tubulovillous adenoma.  Patient reports he is doing well, and other than soreness is feeling well.  Moving around well, GI function is fairly well regulated with a good appetite.  He is to follow-up in about a week or so.    Darek Adams MD  General and Endoscopic Surgery  [facility]    [address], [address]  [place], [place], [address]  P: [phone]  F: [phone]

## 2021-03-16 ENCOUNTER — READMISSION MANAGEMENT (OUTPATIENT)
Dept: CALL CENTER | Facility: HOSPITAL | Age: 86
End: 2021-03-16

## 2021-03-16 NOTE — OUTREACH NOTE
General Surgery Week 2 Survey      Responses   [facility] patient discharged from?  [place]   Does the patient have one of the following disease processes/diagnoses(primary or secondary)?  General Surgery   Week 2 attempt successful?  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  Yes   List who call center can speak with  JAZMÍN SERNA   Person spoke with today (if not patient) and relationship  JAZMÍN SERNA- WIFE   Medication alerts for this patient  Start Norco   Meds reviewed with patient/caregiver?  Yes   Is the patient having any side effects they believe may be caused by any medication additions or changes?  No   Does the patient have all medications related to this admission filled (includes all antibiotics, pain medications, etc.)  Yes   Is the patient taking all medications as directed (includes completed medication regime)?  Yes   Does the patient have a follow up appointment scheduled with their surgeon?  Yes   Has the patient kept scheduled appointments due by today?  N/A   Comments  3/22/2021   Has home health visited the patient within 72 hours of discharge?  N/A   Psychosocial issues?  No   Did the patient receive a copy of their discharge instructions?  Yes   Nursing interventions  Reviewed instructions with patient   What is the patient's perception of their health status since discharge?  Improving   Nursing interventions  Nurse provided patient education   Is the patient /caregiver able to teach back basic post-op care?  Continue use of incentive spirometry at least 1 week post discharge, Practice 'cough and deep breath', Drive as instructed by MD in discharge instructions, Take showers only when approved by MD-sponge bathe until then, No tub bath, swimming, or hot tub until instructed by  MD, Keep incision areas clean,dry and protected, Do not remove steri-strips, Lifting as instructed by MD in discharge instructions   Is the patient/caregiver able to teach back signs and symptoms of incisional infection?  Increased redness, swelling or pain at the incisonal site, Increased drainage or bleeding, Incisional warmth, Pus or odor from incision, Fever   Is the patient/caregiver able to teach back steps to recovery at home?  Set small, achievable goals for return to baseline health, Rest and rebuild strength, gradually increase activity, Practice good oral hygiene, Eat a well-balance diet, Make a list of questions for surgeon's appointment   If the patient is a current smoker, are they able to teach back resources for cessation?  Not a smoker   Is the patient/caregiver able to teach back the hierarchy of who to call/visit for symptoms/problems? PCP, Specialist, Home health nurse, Urgent Care, ED, 911  Yes   Week 2 call completed?  Yes          Tonie Cerna RN

## 2021-03-22 ENCOUNTER — OFFICE VISIT (OUTPATIENT)
Dept: SURGERY | Facility: CLINIC | Age: 86
End: 2021-03-22

## 2021-03-22 VITALS — HEIGHT: 68 IN | BODY MASS INDEX: 22.32 KG/M2 | WEIGHT: 147.27 LBS

## 2021-03-22 DIAGNOSIS — Z90.49 S/P COLON RESECTION: Primary | ICD-10-CM

## 2021-03-22 PROCEDURE — 99024 POSTOP FOLLOW-UP VISIT: CPT | Performed by: SURGERY

## 2021-03-22 NOTE — PROGRESS NOTES
Postop [place] robot assisted sigmoid resection    Subjective:  No specific complaints.  Is difficult to tell exactly how his bowels are functioning, at some points he says that after he eats he feels he has some loose bowel movements, but in general he says his stools have been harder.  No specific abdominal pain.  Appetite is pretty good as is his strength.    Objective:  Patient has been afebrile, vitals are stable  General: Awake and alert, no distress  Abdomen: Soft and benign, incisions are in good order, no sign of infection or trocar site hernia, no sign of infection or hernia at the extraction site in the right lower quadrant.    Assessment and plan:  -Status post robotic sigmoid colon resection for tubulovillous adenoma  -Good progress to this point  -Gradually increase activity  -Follow-up here 4 weeks  -Tentatively plan for repeat colonoscopy in 1 year    Darek Adams MD  General and Endoscopic Surgery  [facility]    [address], [address]  [place], [place], [address]  P: [phone]  F: [phone]

## 2021-03-23 ENCOUNTER — READMISSION MANAGEMENT (OUTPATIENT)
Dept: CALL CENTER | Facility: HOSPITAL | Age: 86
End: 2021-03-23

## 2021-03-23 NOTE — OUTREACH NOTE
General Surgery Week 3 Survey      Responses   [facility] [place] operated by [facility] patient discharged from?  [place]   Does the patient have one of the following disease processes/diagnoses(primary or secondary)?  General Surgery   Week 3 attempt successful?  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  Yes   Is the patient having any side effects they believe may be caused by any medication additions or changes?  No   Does the patient have all medications related to this admission filled (includes all antibiotics, pain medications, etc.)  Yes   Is the patient taking all medications as directed (includes completed medication regime)?  Yes   Does the patient have a follow up appointment scheduled with their surgeon?  Yes   Has the patient kept scheduled appointments due by today?  Yes   Has home health visited the patient within 72 hours of discharge?  N/A   Psychosocial issues?  No   Did the patient receive a copy of their discharge instructions?  Yes   Nursing interventions  Reviewed instructions with patient   What is the patient's perception of their health status since discharge?  Improving   Nursing interventions  Nurse provided patient education   Is the patient /caregiver able to teach back basic post-op care?  No tub bath, swimming, or hot tub until instructed by MD, Keep incision areas clean,dry and protected, Do not remove steri-strips, Lifting as instructed by MD in discharge instructions, Take showers only when approved by MD-sponge bathe until then, Drive as instructed by MD in discharge instructions   Is the patient/caregiver able to teach back signs and symptoms of incisional infection?  Increased redness, swelling or pain at the incisonal site, Incisional  warmth, Fever, Increased drainage or bleeding, Pus or odor from incision   Is the patient/caregiver able to teach back steps to recovery at home?  Set small, achievable goals for return to baseline health, Rest and rebuild strength, gradually increase activity, Eat a well-balance diet   Is the patient/caregiver able to teach back the hierarchy of who to call/visit for symptoms/problems? PCP, Specialist, Home health nurse, Urgent Care, ED, 911  Yes   Additional teach back comments  states appetite good, normal BM's   Week 3 call completed?  Yes          Marilu Pruitt RN

## 2021-03-30 ENCOUNTER — OFFICE VISIT (OUTPATIENT)
Dept: INTERNAL MEDICINE | Facility: CLINIC | Age: 86
End: 2021-03-30

## 2021-03-30 VITALS
HEART RATE: 69 BPM | BODY MASS INDEX: 26.15 KG/M2 | RESPIRATION RATE: 16 BRPM | HEIGHT: 67 IN | SYSTOLIC BLOOD PRESSURE: 142 MMHG | OXYGEN SATURATION: 98 % | DIASTOLIC BLOOD PRESSURE: 62 MMHG | TEMPERATURE: 97 F | WEIGHT: 166.6 LBS

## 2021-03-30 DIAGNOSIS — E11.9 DIABETES MELLITUS WITHOUT COMPLICATION (HCC): ICD-10-CM

## 2021-03-30 DIAGNOSIS — K63.89 MASS OF COLON: ICD-10-CM

## 2021-03-30 DIAGNOSIS — E78.2 MIXED HYPERLIPIDEMIA: ICD-10-CM

## 2021-03-30 DIAGNOSIS — E11.59 TYPE 2 DIABETES MELLITUS WITH OTHER CIRCULATORY COMPLICATION, WITHOUT LONG-TERM CURRENT USE OF INSULIN (HCC): Chronic | ICD-10-CM

## 2021-03-30 DIAGNOSIS — R35.0 URINE FREQUENCY: ICD-10-CM

## 2021-03-30 DIAGNOSIS — I10 ESSENTIAL HYPERTENSION: Primary | Chronic | ICD-10-CM

## 2021-03-30 DIAGNOSIS — I10 HYPERTENSION, ESSENTIAL: ICD-10-CM

## 2021-03-30 PROCEDURE — 99214 OFFICE O/P EST MOD 30 MIN: CPT | Performed by: NURSE PRACTITIONER

## 2021-03-30 RX ORDER — EZETIMIBE 10 MG/1
10 TABLET ORAL DAILY
Qty: 90 TABLET | Refills: 0 | Status: SHIPPED | OUTPATIENT
Start: 2021-03-30 | End: 2021-10-26

## 2021-03-30 RX ORDER — METOPROLOL SUCCINATE 50 MG/1
50 TABLET, EXTENDED RELEASE ORAL NIGHTLY
Qty: 30 TABLET | Refills: 5 | Status: SHIPPED | OUTPATIENT
Start: 2021-03-30 | End: 2022-05-09

## 2021-03-30 RX ORDER — AMLODIPINE BESYLATE 10 MG/1
10 TABLET ORAL DAILY
Qty: 90 TABLET | Refills: 1 | Status: SHIPPED | OUTPATIENT
Start: 2021-03-30 | End: 2021-08-19 | Stop reason: SDUPTHER

## 2021-03-30 RX ORDER — LISINOPRIL 20 MG/1
20 TABLET ORAL DAILY
Qty: 90 TABLET | Refills: 0 | Status: SHIPPED | OUTPATIENT
Start: 2021-03-30 | End: 2022-01-21 | Stop reason: SDUPTHER

## 2021-03-30 NOTE — PROGRESS NOTES
Name: Gera Lira  :  1935    Subjective:      Chief Complaint   Patient presents with   • Hospital Follow Up Visit     s/p sigmoid resection        Gera Lira is a 85 y.o. male who is here for hospitalization follow up.       Admit date: 3/4/21  Discharge date: 3/7/21    Patient was brought in for elective robotic sigmoid colectomy due to unresectable tubulovillous adenoma of sigmoid colon. Postop he did well.  Advance to regular diet.  Mild postop ileus that resolved quickly.     He is here for follow up today.     He states he is not taking pain rx.   BM 2-3 times a day.  Brown. Firm.  Not drinking much water - advised to increase intake.     He continues Toprol 50 mg daily, lisinopril 20 mg, Norvasc 10 mg daily for hypertension.    For diabetes he continues Amaryl and Tradjenta.   For hyperlipidemia he continues Zetia 10 mg, fish oil, livalo daily.       I have reviewed the patient's medical history in detail and updated the computerized patient record.    Discharge medications reviewed with the patient and updated.   Current outpatient and discharge medications have been reconciled for the patient.  Reviewed by: Gage Pro III, NP-C      Past Medical History:   Diagnosis Date   • Adenoma of sigmoid colon    • Angina, class III (CMS/HCC)    • CKD (chronic kidney disease), stage III (CMS/HCC)    • Coronary artery disease    • Diabetes mellitus (CMS/HCC)    • Dyslipidemia    • Essential hypertension    • History of MI (myocardial infarction)    • History of prostate cancer     HX RADIATION    • Hyperlipidemia    • Hypertension    • S/P CABG (coronary artery bypass graft)        Past Surgical History:   Procedure Laterality Date   • CARDIAC CATHETERIZATION     • COLON RESECTION N/A 3/4/2021    Procedure: COLON RESECTION LAPAROSCOPIC SIGMOID WITH DAVINCI ROBOT, FLEXIBLE SIGMOIDOSCOPY;  Surgeon: Darek Adams MD;  Location: [facility];  Service: [facility];   Laterality: N/A;   • COLONOSCOPY     • COLONOSCOPY N/A 2020    Procedure: COLONOSCOPY to cecum with cold polypectomies and hot snare polypectomy with tattoo;  Surgeon: Darek Adams MD;  Location: [facility] ENDOSCOPY;  Service: General;  Laterality: N/A;  pre - rectal bleeding  post - polyps, distal sigmoid mass   • CORONARY ARTERY BYPASS GRAFT     • LUMBAR DISC SURGERY         Family History   Problem Relation Age of Onset   • Coronary artery disease Father    • Hypertension Father    • Breast cancer Sister    • Cerebral aneurysm Sister    • Diabetes Sister    • No Known Problems Mother    • Malig Hyperthermia Neg Hx        Social History     Socioeconomic History   • Marital status:      Spouse name: Not on file   • Number of children: Not on file   • Years of education: Not on file   • Highest education level: Not on file   Tobacco Use   • Smoking status: Former Smoker     Packs/day: 0.50     Years: 20.00     Pack years: 10.00     Types: Cigarettes     Quit date: 1970     Years since quittin.2   • Smokeless tobacco: Never Used   Vaping Use   • Vaping Use: Never used   Substance and Sexual Activity   • Alcohol use: No   • Drug use: No   • Sexual activity: Defer       Most Recent Immunizations   Administered Date(s) Administered   • FLUAD TRI 65YR+ 10/24/2019   • Fluzone High Dose =>65 Years ([employer] ONLY) 10/24/2019   • Pneumococcal Conjugate 13-Valent (PCV13) 2017         Review of Systems:   Review of Systems   Cardiovascular: Negative for chest pain.   Gastrointestinal: Negative for abdominal pain, diarrhea and nausea.   Genitourinary: Negative for difficulty urinating.         Objective:      Physical Exam:   Physical Exam  Vitals reviewed.   Constitutional:       Appearance: He is well-developed.      Comments: ambulating with cane    HENT:      Head: Normocephalic.      Comments: Wearing mask due to COVID   Eyes:      Conjunctiva/sclera: Conjunctivae normal.      Pupils:  "Pupils are equal, round, and reactive to light.   Neck:      Thyroid: No thyromegaly.   Cardiovascular:      Rate and Rhythm: Normal rate and regular rhythm.      Pulses: Normal pulses.      Heart sounds: Normal heart sounds.   Pulmonary:      Effort: Pulmonary effort is normal.      Breath sounds: Normal breath sounds.      Comments: E/U   Abdominal:      General: Bowel sounds are normal.      Palpations: Abdomen is soft.      Comments: Surgical scars - abdomen, healing well.   BS throughout    Musculoskeletal:         General: Normal range of motion.      Cervical back: Normal range of motion and neck supple.   Lymphadenopathy:      Cervical: No cervical adenopathy.   Skin:     General: Skin is warm and dry.      Capillary Refill: Capillary refill takes 2 to 3 seconds.   Neurological:      Mental Status: He is alert and oriented to person, place, and time.   Psychiatric:         Behavior: Behavior normal. Behavior is cooperative.         Thought Content: Thought content normal.         Judgment: Judgment normal.           Vital Signs:  Vitals:    03/30/21 1543   BP: 142/62   BP Location: Left arm   Patient Position: Sitting   Cuff Size: Adult   Pulse: 69   Resp: 16   Temp: 97 °F (36.1 °C)   TempSrc: Temporal   SpO2: 98%   Weight: 75.6 kg (166 lb 9.6 oz)   Height: 170.2 cm (67\")     Body mass index is 26.09 kg/m².      Results Review:      REVIEWED AND DISCUSSED LAB RESULTS WITH PATIENT      Requested Prescriptions     Signed Prescriptions Disp Refills   • linagliptin (Tradjenta) 5 MG tablet tablet 90 tablet 1     Sig: Take 1 tablet by mouth Daily.   • metoprolol succinate XL (TOPROL-XL) 50 MG 24 hr tablet 30 tablet 5     Sig: Take 1 tablet by mouth Every Night.   • ezetimibe (ZETIA) 10 MG tablet 90 tablet 0     Sig: Take 1 tablet by mouth Daily.   • amLODIPine (NORVASC) 10 MG tablet 90 tablet 1     Sig: Take 1 tablet by mouth Daily.   • pitavastatin calcium (Livalo) 2 MG tablet tablet 90 tablet 0     Sig: Take 1 " tablet by mouth Every Night.   • lisinopril (PRINIVIL,ZESTRIL) 20 MG tablet 90 tablet 0     Sig: Take 1 tablet by mouth Daily.         Current Outpatient Medications:   •  ALBUTEROL SULFATE  (90 Base) MCG/ACT inhaler, INHALE 2 PUFFS BY MOUTH EVERY 4 HOURS AS NEEDED (Patient taking differently: Inhale 2 puffs Every 4 (Four) Hours As Needed.), Disp: 42.5 g, Rfl: 0  •  amLODIPine (NORVASC) 10 MG tablet, Take 1 tablet by mouth Daily., Disp: 90 tablet, Rfl: 1  •  ezetimibe (ZETIA) 10 MG tablet, Take 1 tablet by mouth Daily., Disp: 90 tablet, Rfl: 0  •  glimepiride (AMARYL) 2 MG tablet, TAKE 1 TABLET BY MOUTH EVERY MORNING BEFORE BREAKFAST, Disp: 90 tablet, Rfl: 0  •  glucose blood test strip, Test once daily, Disp: 30 each, Rfl: 12  •  linagliptin (Tradjenta) 5 MG tablet tablet, Take 1 tablet by mouth Daily., Disp: 90 tablet, Rfl: 1  •  lisinopril (PRINIVIL,ZESTRIL) 20 MG tablet, Take 1 tablet by mouth Daily., Disp: 90 tablet, Rfl: 0  •  metoprolol succinate XL (TOPROL-XL) 50 MG 24 hr tablet, Take 1 tablet by mouth Every Night., Disp: 30 tablet, Rfl: 5  •  Omega-3 Fatty Acids (FISH OIL) 1200 MG capsule capsule, Take 1,200 mg by mouth Every Night. HELD FOR OR, Disp: , Rfl:   •  pitavastatin calcium (Livalo) 2 MG tablet tablet, Take 1 tablet by mouth Every Night., Disp: 90 tablet, Rfl: 0       Assessment and Plan:        Problems Addressed this Visit        Cardiac and Vasculature    Essential hypertension - Primary (Chronic)     Hypertension is improving with treatment.  Continue current treatment regimen.  Dietary sodium restriction.  Blood pressure will be reassessed at the next regular appointment.         Relevant Medications    metoprolol succinate XL (TOPROL-XL) 50 MG 24 hr tablet    amLODIPine (NORVASC) 10 MG tablet    lisinopril (PRINIVIL,ZESTRIL) 20 MG tablet       Endocrine and Metabolic    Diabetes mellitus (CMS/HCC) (Chronic)     Diabetes is improving with treatment.   Continue current treatment  regimen.  Diabetes will be reassessed in 3 months.    Lab Results   Component Value Date    HGBA1C 7.71 (H) 11/24/2020            Relevant Medications    linagliptin (Tradjenta) 5 MG tablet tablet       Gastrointestinal Abdominal     Mass of colon     S/P resection.   BM - still several times during the day. No N/V/D  Advised to increase fiber and water intake.   Pain controlled.            Other Visit Diagnoses     Diabetes mellitus without complication (CMS/HCC)        Relevant Medications    linagliptin (Tradjenta) 5 MG tablet tablet    ezetimibe (ZETIA) 10 MG tablet    amLODIPine (NORVASC) 10 MG tablet    Hypertension, essential        Relevant Medications    linagliptin (Tradjenta) 5 MG tablet tablet    metoprolol succinate XL (TOPROL-XL) 50 MG 24 hr tablet    ezetimibe (ZETIA) 10 MG tablet    amLODIPine (NORVASC) 10 MG tablet    lisinopril (PRINIVIL,ZESTRIL) 20 MG tablet    Mixed hyperlipidemia        Relevant Medications    linagliptin (Tradjenta) 5 MG tablet tablet    ezetimibe (ZETIA) 10 MG tablet    amLODIPine (NORVASC) 10 MG tablet    pitavastatin calcium (Livalo) 2 MG tablet tablet    Urine frequency        Relevant Medications    linagliptin (Tradjenta) 5 MG tablet tablet    ezetimibe (ZETIA) 10 MG tablet    amLODIPine (NORVASC) 10 MG tablet      Diagnoses       Codes Comments    Essential hypertension    -  Primary ICD-10-CM: I10  ICD-9-CM: 401.9     Diabetes mellitus without complication (CMS/HCC)     ICD-10-CM: E11.9  ICD-9-CM: 250.00     Hypertension, essential     ICD-10-CM: I10  ICD-9-CM: 401.9     Mixed hyperlipidemia     ICD-10-CM: E78.2  ICD-9-CM: 272.2     Urine frequency     ICD-10-CM: R35.0  ICD-9-CM: 788.41     Mass of colon     ICD-10-CM: K63.89  ICD-9-CM: 569.89     Type 2 diabetes mellitus with other circulatory complication, without long-term current use of insulin (CMS/HCC)     ICD-10-CM: E11.59  ICD-9-CM: 250.70              Discussed any change in Rx and discussed visit with patient.  " All questions answered.      He has follow up with me in May. He asked for refills of medications today - sent.     He will follow up with Dr Adams on 4/19/21    Kurtis \"Anthony\" THOMAS Pro   03/30/21    Additional Patient Counseling:       There are no Patient Instructions on file for this visit.  "

## 2021-03-31 NOTE — ASSESSMENT & PLAN NOTE
Renal condition is unchanged.  Continue current treatment regimen.  Renal condition will be reassessed in 3 months.    Avoid nephrotoxic medications - especially nsaids (like Ibuprofen, aleve)   Maintain blood pressure control  Maintain blood sugar control     Lab Results   Component Value Date    CREATININE 1.38 (H) 03/06/2021

## 2021-03-31 NOTE — ASSESSMENT & PLAN NOTE
Diabetes is improving with treatment.   Continue current treatment regimen.  Diabetes will be reassessed in 3 months.    Lab Results   Component Value Date    HGBA1C 7.71 (H) 11/24/2020

## 2021-03-31 NOTE — ASSESSMENT & PLAN NOTE
S/P resection.   BM - still several times during the day. No N/V/D  Advised to increase fiber and water intake.   Pain controlled.

## 2021-04-09 DIAGNOSIS — E11.59 TYPE 2 DIABETES MELLITUS WITH OTHER CIRCULATORY COMPLICATION, WITHOUT LONG-TERM CURRENT USE OF INSULIN (HCC): Primary | ICD-10-CM

## 2021-04-09 NOTE — TELEPHONE ENCOUNTER
Caller: Gera Lira    Relationship: Self    Best call back number: [phone] (M)    Medication needed:   Requested Prescriptions     Pending Prescriptions Disp Refills   • glimepiride (AMARYL) 2 MG tablet 90 tablet 0     Sig: Take 1 tablet by mouth Every Morning Before Breakfast.       When do you need the refill by: ASAP    What additional details did the patient provide when requesting the medication: PATIENT IS AT PHARMACY, HAS BEEN OUT OF MEDICATION FOR 2 WEEKS    Does the patient have less than a 3 day supply:  [x] Yes  [] No    What is the patient's preferred pharmacy: [id number] - [place], [place] - [address] AT [address] - [phone] [facility] [phone] FX

## 2021-04-11 RX ORDER — GLIMEPIRIDE 2 MG/1
2 TABLET ORAL
Qty: 90 TABLET | Refills: 0 | Status: SHIPPED | OUTPATIENT
Start: 2021-04-11 | End: 2021-07-29

## 2021-04-19 ENCOUNTER — OFFICE VISIT (OUTPATIENT)
Dept: SURGERY | Facility: CLINIC | Age: 86
End: 2021-04-19

## 2021-04-19 VITALS — BODY MASS INDEX: 25.95 KG/M2 | HEIGHT: 67 IN | WEIGHT: 165.34 LBS

## 2021-04-19 DIAGNOSIS — K63.89 COLONIC MASS: Primary | ICD-10-CM

## 2021-04-19 PROCEDURE — 99024 POSTOP FOLLOW-UP VISIT: CPT | Performed by: SURGERY

## 2021-04-19 NOTE — PROGRESS NOTES
Postop robotic sigmoid resection    Overall feeling well.  He reports multiple bowel movements per day, is difficult to tell exactly how these are working, but it sounds he gets mostly frequent but relatively solid stools.  He denies much pain.  Appetite is good.    Objective:  General: Awake and alert, appears comfortable  Abdomen: Soft and benign, incisions are well-healed, no tenderness and no sign of hernia or infection    Assessment and plan:  -Status post robotic sigmoid resection for tubulovillous adenoma  -Overall progressing well  -Multiple stools, relatively hard, recommend beginning fiber regimen  -Follow-up 4 weeks here    Darek Adams MD  General and Endoscopic Surgery  [facility]    [address], [address]  [place], [place], [address]  P: [phone]  F: [phone]

## 2021-04-30 ENCOUNTER — TELEPHONE (OUTPATIENT)
Dept: SURGERY | Facility: CLINIC | Age: 86
End: 2021-04-30

## 2021-04-30 NOTE — TELEPHONE ENCOUNTER
"Patient's wife called stating patient is having issues with his bowel movements s/p lap sigmoid colectomy 3/4/21. Patient states \"his food is not staying in\" and c/o needing to have a bowel movement during and after meals. He has not had diarrhea and it sounds as though he is having regular formed stool but he states he goes 5-10x per day. He sometimes has the urge to go but only has a small \"ball\" of stool. Pt's wife states he has had to wear depends because he had a bowel movement on himself and didn't realize it while playing golf. He has been taking Benefiber and a probiotic. Do you have any recommendations?   "

## 2021-05-17 ENCOUNTER — OFFICE VISIT (OUTPATIENT)
Dept: SURGERY | Facility: CLINIC | Age: 86
End: 2021-05-17

## 2021-05-17 VITALS — BODY MASS INDEX: 22.91 KG/M2 | WEIGHT: 146 LBS | HEIGHT: 67 IN

## 2021-05-17 DIAGNOSIS — Z90.49 S/P COLON RESECTION: Primary | ICD-10-CM

## 2021-05-17 PROCEDURE — 99024 POSTOP FOLLOW-UP VISIT: CPT | Performed by: SURGERY

## 2021-05-17 NOTE — PROGRESS NOTES
Follow-up sigmoid colectomy for unresectable polyp    Subjective:  Overall patient feels he is doing better.  He has been taking the Metamucil regularly and this is improving his bowels.  His wife says that she thinks he is doing much better, he is not quite as certain.  He still feels like he goes a little bit more frequently than he would like, although he does not empty completely.  No pain.  Appetite is good.  Still feels weak but overall this is also improving.    Objective:  General: Awake and alert, no distress  Abdomen: Soft and benign, incisions are nicely healed, no evidence for hernia    Assessment and plan:  -Endoscopically unresectable polyp of the sigmoid colon, now about 10 weeks out from robotic sigmoid resection, recovering well  -Bowel irregularity, no think there is much more to be done at this time, recommend continued Metamucil  -Diet and activity as tolerated from my standpoint  -Plan for repeat colonoscopy March 2022    Darek Adams MD  General and Endoscopic Surgery  [facility], [facility]    [address], [address]  [place], [place], [address]  P: [phone]  F: [phone]

## 2021-06-08 ENCOUNTER — OFFICE VISIT (OUTPATIENT)
Dept: INTERNAL MEDICINE | Facility: CLINIC | Age: 86
End: 2021-06-08

## 2021-06-08 VITALS
HEART RATE: 76 BPM | SYSTOLIC BLOOD PRESSURE: 130 MMHG | DIASTOLIC BLOOD PRESSURE: 80 MMHG | WEIGHT: 148.6 LBS | OXYGEN SATURATION: 98 % | TEMPERATURE: 98.4 F | HEIGHT: 67 IN | BODY MASS INDEX: 23.32 KG/M2 | RESPIRATION RATE: 16 BRPM

## 2021-06-08 DIAGNOSIS — M10.9 ACUTE GOUT INVOLVING TOE OF RIGHT FOOT, UNSPECIFIED CAUSE: ICD-10-CM

## 2021-06-08 DIAGNOSIS — E78.2 MIXED HYPERLIPIDEMIA: ICD-10-CM

## 2021-06-08 DIAGNOSIS — I25.10 CORONARY ARTERY DISEASE INVOLVING NATIVE CORONARY ARTERY OF NATIVE HEART WITHOUT ANGINA PECTORIS: Chronic | ICD-10-CM

## 2021-06-08 DIAGNOSIS — H90.6 MIXED CONDUCTIVE AND SENSORINEURAL HEARING LOSS OF BOTH EARS: ICD-10-CM

## 2021-06-08 DIAGNOSIS — I10 ESSENTIAL HYPERTENSION: Chronic | ICD-10-CM

## 2021-06-08 DIAGNOSIS — N18.31 STAGE 3A CHRONIC KIDNEY DISEASE (HCC): Chronic | ICD-10-CM

## 2021-06-08 DIAGNOSIS — E11.9 DIABETES MELLITUS WITHOUT COMPLICATION (HCC): ICD-10-CM

## 2021-06-08 DIAGNOSIS — E78.5 DYSLIPIDEMIA: Chronic | ICD-10-CM

## 2021-06-08 DIAGNOSIS — N52.9 ERECTILE DYSFUNCTION, UNSPECIFIED ERECTILE DYSFUNCTION TYPE: ICD-10-CM

## 2021-06-08 DIAGNOSIS — K63.89 COLONIC MASS: ICD-10-CM

## 2021-06-08 DIAGNOSIS — Z00.00 MEDICARE ANNUAL WELLNESS VISIT, SUBSEQUENT: Primary | ICD-10-CM

## 2021-06-08 DIAGNOSIS — I10 HYPERTENSION, ESSENTIAL: ICD-10-CM

## 2021-06-08 DIAGNOSIS — E11.59 TYPE 2 DIABETES MELLITUS WITH OTHER CIRCULATORY COMPLICATION, WITHOUT LONG-TERM CURRENT USE OF INSULIN (HCC): Chronic | ICD-10-CM

## 2021-06-08 PROBLEM — K62.5 RECTAL BLEEDING: Status: RESOLVED | Noted: 2020-12-07 | Resolved: 2021-06-08

## 2021-06-08 LAB
ALBUMIN SERPL-MCNC: 4.3 G/DL (ref 3.5–5.2)
ALBUMIN/GLOB SERPL: 1.7 G/DL
ALP SERPL-CCNC: 65 U/L (ref 39–117)
ALT SERPL-CCNC: 15 U/L (ref 1–41)
AST SERPL-CCNC: 12 U/L (ref 1–40)
BILIRUB SERPL-MCNC: 0.2 MG/DL (ref 0–1.2)
BUN SERPL-MCNC: 15 MG/DL (ref 8–23)
BUN/CREAT SERPL: 10.9 (ref 7–25)
CALCIUM SERPL-MCNC: 9.6 MG/DL (ref 8.6–10.5)
CHLORIDE SERPL-SCNC: 105 MMOL/L (ref 98–107)
CHOLEST SERPL-MCNC: 156 MG/DL (ref 0–200)
CO2 SERPL-SCNC: 32.1 MMOL/L (ref 22–29)
CREAT SERPL-MCNC: 1.38 MG/DL (ref 0.76–1.27)
ERYTHROCYTE [DISTWIDTH] IN BLOOD BY AUTOMATED COUNT: 12.5 % (ref 12.3–15.4)
GLOBULIN SER CALC-MCNC: 2.5 GM/DL
GLUCOSE SERPL-MCNC: 106 MG/DL (ref 65–99)
HBA1C MFR BLD: 5.8 % (ref 4.8–5.6)
HCT VFR BLD AUTO: 33 % (ref 37.5–51)
HDLC SERPL-MCNC: 68 MG/DL (ref 40–60)
HGB BLD-MCNC: 10 G/DL (ref 13–17.7)
LDLC SERPL CALC-MCNC: 71 MG/DL (ref 0–100)
LDLC/HDLC SERPL: 1.03 {RATIO}
MCH RBC QN AUTO: 24.3 PG (ref 26.6–33)
MCHC RBC AUTO-ENTMCNC: 30.3 G/DL (ref 31.5–35.7)
MCV RBC AUTO: 80.3 FL (ref 79–97)
PLATELET # BLD AUTO: 271 10*3/MM3 (ref 140–450)
POTASSIUM SERPL-SCNC: 4.3 MMOL/L (ref 3.5–5.2)
PROT SERPL-MCNC: 6.8 G/DL (ref 6–8.5)
RBC # BLD AUTO: 4.11 10*6/MM3 (ref 4.14–5.8)
SODIUM SERPL-SCNC: 145 MMOL/L (ref 136–145)
TRIGL SERPL-MCNC: 90 MG/DL (ref 0–150)
URATE SERPL-MCNC: 6.1 MG/DL (ref 3.4–7)
VLDLC SERPL CALC-MCNC: 17 MG/DL (ref 5–40)
WBC # BLD AUTO: 6.51 10*3/MM3 (ref 3.4–10.8)

## 2021-06-08 PROCEDURE — G0439 PPPS, SUBSEQ VISIT: HCPCS | Performed by: NURSE PRACTITIONER

## 2021-06-08 RX ORDER — ASPIRIN 81 MG/1
81 TABLET ORAL DAILY
Start: 2021-06-08

## 2021-06-08 RX ORDER — TADALAFIL 20 MG/1
20 TABLET ORAL DAILY
Qty: 9 TABLET | Refills: 5 | Status: SHIPPED | OUTPATIENT
Start: 2021-06-08 | End: 2023-01-03 | Stop reason: SDUPTHER

## 2021-06-08 RX ORDER — PREDNISONE 10 MG/1
30 TABLET ORAL DAILY
Qty: 15 TABLET | Refills: 0 | Status: SHIPPED | OUTPATIENT
Start: 2021-06-08 | End: 2021-06-13

## 2021-06-08 NOTE — ASSESSMENT & PLAN NOTE
Diabetes is improving with treatment.   Continue current treatment regimen.  Discussed foot care.  Reminded to get yearly retinal exam.  Diabetes will be reassessed in 6 months.    Your last A1C (3 month average) =   Lab Results   Component Value Date    HGBA1C 7.71 (H) 11/24/2020      Your diabetes is Uncontrolled.  Goal is to be less than 7% - will recheck today.     Eye Health:   You need a diabetic eye exam yearly.   Please have a copy of the note faxed to my office.   Fax: [phone]    Foot Health:   You need a diabetic foot exam yearly.   Check your feet routinely for any wounds.   You should always check your shoes for any debris that could cause a wound.

## 2021-06-08 NOTE — ASSESSMENT & PLAN NOTE
Stable.   Lab Results   Component Value Date    CREATININE 1.38 (H) 03/06/2021     Avoid nephrotoxic medications - especially nsaids (like Ibuprofen, aleve)   Maintain blood pressure control  Maintain blood sugar control

## 2021-06-08 NOTE — ASSESSMENT & PLAN NOTE
Improved on livalo and zetia       Lab Results   Component Value Date    CHOL 159 11/24/2020    CHLPL 228 (H) 12/20/2014    TRIG 86 11/24/2020    HDL 74 (H) 11/24/2020    LDL 69 11/24/2020

## 2021-06-08 NOTE — PATIENT INSTRUCTIONS
Medicare Wellness  Personal Prevention Plan of Service     Date of Office Visit:  2021  Encounter Provider:  Gage Pro III, NP-C  Place of Service:  [facility]  Patient Name: Gera Lira  :  1935    As part of the Medicare Wellness portion of your visit today, we are providing you with this personalized preventive plan of services (PPPS). This plan is based upon recommendations of the [place] Preventive Services Task Force (USPSTF) and the Advisory Committee on Immunization Practices (ACIP).    This lists the preventive care services that should be considered, and provides dates of when you are due. Items listed as completed are up-to-date and do not require any further intervention.    Health Maintenance   Topic Date Due   • COVID-19 Vaccine (1) Never done   • TDAP/TD VACCINES (1 - Tdap) Never done   • ZOSTER VACCINE (1 of 2) Never done   • DIABETIC EYE EXAM  Never done   • Pneumococcal Vaccine 65+ (2 of 2 - PPSV23) 2018   • HEMOGLOBIN A1C  2021   • INFLUENZA VACCINE  2021   • ANNUAL WELLNESS VISIT  2021   • LIPID PANEL  2021   • URINE MICROALBUMIN  2021       Orders Placed This Encounter   Procedures   • Comprehensive Metabolic Panel     Order Specific Question:   Release to patient     Answer:   Immediate   • Lipid Panel With LDL / HDL Ratio     Order Specific Question:   Release to patient     Answer:   Immediate   • CBC (No Diff)     Order Specific Question:   Release to patient     Answer:   Immediate   • Hemoglobin A1c     Order Specific Question:   Release to patient     Answer:   Immediate   • Uric Acid     Order Specific Question:   Release to patient     Answer:   Immediate       Return in about 6 months (around 2021).

## 2021-06-08 NOTE — PROGRESS NOTES
"The ABCs of the Annual Wellness Visit  Subsequent Medicare Wellness Visit    Chief Complaint   Patient presents with   • Medicare Wellness-subsequent       Subjective   History of Present Illness:  Gera Lira is a 86 y.o. male who presents for a Subsequent Medicare Wellness Visit.    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 LV 5/17/2021  He will have repeat colonoscopy 3/2022   He states food is \"staying in longer\", he was having dumping syndrome post op.     [facility]     Gout since last Tuesday: R great toe.  Recurrent.     Wt Readings from Last 4 Encounters:   06/08/21 67.4 kg (148 lb 9.6 oz)   05/17/21 66.2 kg (146 lb)   04/19/21 75 kg (165 lb 5.5 oz)   03/30/21 75.6 kg (166 lb 9.6 oz)       Weight trend is worsening.  But up 2 lbs since last visit.     His cardiovascular risks are:    KNOWN CAD    [x] Hypertension    [x] Hyperlipidemia  [x] Diabetes     [] Obesity  [] Family history    [] Current or hx tobacco use  [] Sedentary lifestyle       Male:   [] Testosterone use     Mobility    [] Ambulates independently  [x] Ambulates with cane   [] Ambulates with quad cane  [] Ambulates with rollator   [] Ambulates with walker   [] Mobile with wheelchair   [] Mobile with electric wheelchair     Continence    [x] Continent of bowel and bladder  [] Incontinent bowel and bladder   [] Incontinent bladder    [] Incontinent bowel     HEALTH RISK ASSESSMENT    Recent Hospitalizations:  Recently treated at the following:  [facility]    Current Medical Providers:  Patient Care Team:  Gage Pro III, NP-C as PCP - General (Family Medicine)  Raj Vidales MD as Consulting Physician (Cardiology)    Smoking Status:  Social History     Tobacco Use   Smoking " Status Former Smoker   • Packs/day: 0.50   • Years: 20.00   • Pack years: 10.00   • Types: Cigarettes   • Quit date: 1970   • Years since quittin.4   Smokeless Tobacco Never Used       Alcohol Consumption:  Social History     Substance and Sexual Activity   Alcohol Use No       Depression Screen:   PHQ-2/PHQ-9 Depression Screening 2021   Little interest or pleasure in doing things 0   Feeling down, depressed, or hopeless 0   Trouble falling or staying asleep, or sleeping too much -   Feeling tired or having little energy -   Poor appetite or overeating -   Feeling bad about yourself - or that you are a failure or have let yourself or your family down -   Trouble concentrating on things, such as reading the newspaper or watching television -   Moving or speaking so slowly that other people could have noticed. Or the opposite - being so fidgety or restless that you have been moving around a lot more than usual -   Thoughts that you would be better off dead, or of hurting yourself in some way -   Total Score 0   If you checked off any problems, how difficult have these problems made it for you to do your work, take care of things at home, or get along with other people? -       Fall Risk Screen:  APARNAADI Fall Risk Assessment was completed, and patient is at LOW risk for falls.Assessment completed on:2021    Health Habits and Functional and Cognitive Screening:  Functional & Cognitive Status 2021   Do you have difficulty preparing food and eating? No   Do you have difficulty bathing yourself, getting dressed or grooming yourself? No   Do you have difficulty using the toilet? Yes   Do you have difficulty moving around from place to place? No   Do you have trouble with steps or getting out of a bed or a chair? No   Current Diet Well Balanced Diet   Dental Exam Up to date   Eye Exam Up to date   Exercise (times per week) 0 times per week   Current Exercises Include -   Current Exercise Activities  Include None   Do you need help using the phone?  No   Are you deaf or do you have serious difficulty hearing?  Yes   Do you need help with transportation? No   Do you need help shopping? Yes   Do you need help preparing meals?  No   Do you need help with housework?  No   Do you need help with laundry? No   Do you need help taking your medications? No   Do you need help managing money? No   Do you ever drive or ride in a car without wearing a seat belt? No   Have you felt unusual stress, anger or loneliness in the last month? No   Who do you live with? Spouse   If you need help, do you have trouble finding someone available to you? No   Have you been bothered in the last four weeks by sexual problems? Yes   Do you have difficulty concentrating, remembering or making decisions? No         Does the patient have evidence of cognitive impairment? No    Asprin use counseling:Does not need ASA (and currently is not on it)    Age-appropriate Screening Schedule:  Refer to the list below for future screening recommendations based on patient's age, sex and/or medical conditions. Orders for these recommended tests are listed in the plan section. The patient has been provided with a written plan.    Health Maintenance   Topic Date Due   • TDAP/TD VACCINES (1 - Tdap) Never done   • ZOSTER VACCINE (1 of 2) Never done   • DIABETIC EYE EXAM  Never done   • HEMOGLOBIN A1C  05/24/2021   • INFLUENZA VACCINE  08/01/2021   • LIPID PANEL  11/24/2021   • URINE MICROALBUMIN  11/24/2021          The following portions of the patient's history were reviewed and updated as appropriate: allergies, current medications, past family history, past medical history, past social history, past surgical history and problem list.    Outpatient Medications Prior to Visit   Medication Sig Dispense Refill   • ALBUTEROL SULFATE  (90 Base) MCG/ACT inhaler INHALE 2 PUFFS BY MOUTH EVERY 4 HOURS AS NEEDED (Patient taking differently: Inhale 2 puffs Every  4 (Four) Hours As Needed.) 42.5 g 0   • amLODIPine (NORVASC) 10 MG tablet Take 1 tablet by mouth Daily. 90 tablet 1   • ezetimibe (ZETIA) 10 MG tablet Take 1 tablet by mouth Daily. 90 tablet 0   • glimepiride (AMARYL) 2 MG tablet Take 1 tablet by mouth Every Morning Before Breakfast. 90 tablet 0   • glucose blood test strip Test once daily 30 each 12   • linagliptin (Tradjenta) 5 MG tablet tablet Take 1 tablet by mouth Daily. 90 tablet 1   • lisinopril (PRINIVIL,ZESTRIL) 20 MG tablet Take 1 tablet by mouth Daily. 90 tablet 0   • metoprolol succinate XL (TOPROL-XL) 50 MG 24 hr tablet Take 1 tablet by mouth Every Night. 30 tablet 5   • Omega-3 Fatty Acids (FISH OIL) 1200 MG capsule capsule Take 1,200 mg by mouth Every Night. HELD FOR OR     • pitavastatin calcium (Livalo) 2 MG tablet tablet Take 1 tablet by mouth Every Night. 90 tablet 0     No facility-administered medications prior to visit.       Patient Active Problem List   Diagnosis   • Right carotid bruit   • Coronary artery disease   • Diabetes mellitus (CMS/HCC)   • Dyslipidemia   • Essential hypertension   • Stenosis of right carotid artery   • Hx of radiation therapy   • Stage 3a chronic kidney disease (CMS/HCC)   • Colonic mass   • Mixed conductive and sensorineural hearing loss of both ears   • Erectile dysfunction       Advanced Care Planning:  ACP discussion was held with the patient during this visit. Patient does not have an advance directive, information provided.   [facility] but [facility] not [facility].     Review of Systems    Compared to one year ago, the patient feels his physical health is the same.  Compared to one year ago, the patient feels his mental health is the same.    Reviewed chart for potential of high risk medication in the elderly: yes  Reviewed chart for potential of harmful drug interactions in the elderly:yes    Objective         Vitals:    06/08/21 1514   BP: 130/80   BP Location: Left arm   Patient  "Position: Sitting   Cuff Size: Adult   Pulse: 76   Resp: 16   Temp: 98.4 °F (36.9 °C)   TempSrc: Temporal   SpO2: 98%   Weight: 67.4 kg (148 lb 9.6 oz)   Height: 170.2 cm (67\")   PainSc:   4   PainLoc: Foot       Body mass index is 23.27 kg/m².  Discussed the patient's BMI with him. The BMI is in the acceptable range.    Physical Exam          Assessment/Plan   Medicare Risks and Personalized Health Plan  CMS Preventative Services Quick Reference  Advance Directive Discussion  Cardiovascular risk  Fall Risk    The above risks/problems have been discussed with the patient.  Pertinent information has been shared with the patient in the After Visit Summary.  Follow up plans and orders are seen below in the Assessment/Plan Section.    Problem List Items Addressed This Visit        Cardiac and Vasculature    Coronary artery disease (Chronic)    Overview     Continues ASA and statin          Relevant Medications    tadalafil (Cialis) 20 MG tablet    Dyslipidemia (Chronic)    Current Assessment & Plan     Improved on livalo and zetia       Lab Results   Component Value Date    CHOL 159 11/24/2020    CHLPL 228 (H) 12/20/2014    TRIG 86 11/24/2020    HDL 74 (H) 11/24/2020    LDL 69 11/24/2020            Relevant Orders    Comprehensive Metabolic Panel    Lipid Panel With LDL / HDL Ratio    Essential hypertension (Chronic)    Current Assessment & Plan     Hypertension is improving with treatment.  Continue current treatment regimen.  Dietary sodium restriction.  Regular aerobic exercise.  Blood pressure will be reassessed at the next regular appointment.         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 "   Continue current treatment regimen.  Discussed foot care.  Reminded to get yearly retinal exam.  Diabetes will be reassessed in 6 months.    Your last A1C (3 month average) =   Lab Results   Component Value Date    HGBA1C 7.71 (H) 11/24/2020      Your diabetes is Uncontrolled.  Goal is to be less than 7% - will recheck today.     Eye Health:   You need a diabetic eye exam yearly.   Please have a copy of the note faxed to my office.   Fax: [phone]    Foot Health:   You need a diabetic foot exam yearly.   Check your feet routinely for any wounds.   You should always check your shoes for any debris that could cause a wound.            Relevant Orders    Comprehensive Metabolic Panel    Hemoglobin A1c       Gastrointestinal Abdominal     Colonic mass    Overview     Tubulovillous adenoma of sigmoid colon that was unresectable s/p sigmoid colectomy    Next colonoscopy: 3/2022  Dr Adams             Genitourinary and Reproductive     Erectile dysfunction (Chronic)    Current Assessment & Plan     Cialias is effective, will refill today.          Relevant Medications    tadalafil (Cialis) 20 MG tablet    Stage 3a chronic kidney disease (CMS/[facility]) (Chronic)    Current Assessment & Plan     Stable.   Lab Results   Component Value Date    CREATININE 1.38 (H) 03/06/2021     Avoid nephrotoxic medications - especially nsaids (like Ibuprofen, aleve)   Maintain blood pressure control  Maintain blood sugar control            Other Visit Diagnoses     Medicare annual wellness visit, subsequent    -  Primary    Diabetes mellitus without complication (CMS/[facility])        Hypertension, essential        Mixed hyperlipidemia        Acute gout involving toe of right foot, unspecified cause        Will tx with prednisone and check uric acid     Relevant Medications    predniSONE (DELTASONE) 10 MG tablet    Other Relevant Orders    Uric Acid      Follow Up:  Return in about 6 months (around 12/8/2021).     An After Visit Summary and PPPS  were given to the patient.

## 2021-07-29 DIAGNOSIS — E11.59 TYPE 2 DIABETES MELLITUS WITH OTHER CIRCULATORY COMPLICATION, WITHOUT LONG-TERM CURRENT USE OF INSULIN (HCC): ICD-10-CM

## 2021-07-29 RX ORDER — GLIMEPIRIDE 2 MG/1
2 TABLET ORAL
Qty: 90 TABLET | Refills: 1 | Status: SHIPPED | OUTPATIENT
Start: 2021-07-29 | End: 2022-02-22

## 2021-08-19 DIAGNOSIS — R35.0 URINE FREQUENCY: ICD-10-CM

## 2021-08-19 DIAGNOSIS — E11.9 DIABETES MELLITUS WITHOUT COMPLICATION (HCC): ICD-10-CM

## 2021-08-19 DIAGNOSIS — I10 HYPERTENSION, ESSENTIAL: ICD-10-CM

## 2021-08-19 DIAGNOSIS — E78.2 MIXED HYPERLIPIDEMIA: ICD-10-CM

## 2021-08-19 RX ORDER — AMLODIPINE BESYLATE 10 MG/1
10 TABLET ORAL DAILY
Qty: 90 TABLET | Refills: 1 | Status: SHIPPED | OUTPATIENT
Start: 2021-08-19 | End: 2022-05-23

## 2021-08-19 NOTE — TELEPHONE ENCOUNTER
Caller: Gera Lira    Relationship: Self    Best call back number: [phone]*    Medication needed:   Requested Prescriptions     Pending Prescriptions Disp Refills   • amLODIPine (NORVASC) 10 MG tablet 90 tablet 1     Sig: Take 1 tablet by mouth Daily.       When do you need the refill by: ASAP    What additional details did the patient provide when requesting the medication: PATIENT COMPLETELY OUT OF MEDICATION SINCE YESTERDAY    Does the patient have less than a 3 day supply:  [x] Yes  [] No    What is the patient's preferred pharmacy: [id number] - [address] - [phone] [facility] [phone] FX

## 2021-09-22 ENCOUNTER — OFFICE VISIT (OUTPATIENT)
Dept: INTERNAL MEDICINE | Facility: CLINIC | Age: 86
End: 2021-09-22

## 2021-09-22 VITALS
HEART RATE: 83 BPM | OXYGEN SATURATION: 99 % | TEMPERATURE: 98 F | BODY MASS INDEX: 23.23 KG/M2 | RESPIRATION RATE: 16 BRPM | SYSTOLIC BLOOD PRESSURE: 128 MMHG | HEIGHT: 67 IN | DIASTOLIC BLOOD PRESSURE: 82 MMHG | WEIGHT: 148 LBS

## 2021-09-22 DIAGNOSIS — D64.9 ANEMIA, UNSPECIFIED TYPE: ICD-10-CM

## 2021-09-22 DIAGNOSIS — K59.1 FUNCTIONAL DIARRHEA: Primary | ICD-10-CM

## 2021-09-22 PROCEDURE — 99213 OFFICE O/P EST LOW 20 MIN: CPT | Performed by: NURSE PRACTITIONER

## 2021-09-22 NOTE — PATIENT INSTRUCTIONS
Resume metamucil     You may take imodium as directed on package  - not to exceed 4 days.     I will make a referral to GI.  They will contact you to schedule an appointment.       We will also check your labs today to look at your blood count and will contact you with results.

## 2021-09-22 NOTE — PROGRESS NOTES
"        Chief Complaint  bowel problem (pt states that he keeps going all the time )     Subjective:      History of Present Illness {CC  Problem List  Visit  Diagnosis   Encounters  Notes  Medications  Labs  Result Review Imaging  Media :23}     Gera Lira presents to [facility] for diarrhea     He had a sigmoid colectomy for unresectable polyp (TA) 3/2021 and is due for repeat colonoscopy 3/2022.   He has had irregular bowels and advised to take metamucil.     He states he got better in regards to diarrhea, stopped metamucil and now it is worse.    States \"after I eat, it's like it goes straight thru\".   No N/V  Stool is brown liquid.     No fever or chills or abd pain.  No dark or bloody stools.   No change in diet.      Objective:      Physical Exam  Constitutional:       Appearance: Normal appearance.   Cardiovascular:      Rate and Rhythm: Normal rate.      Pulses: Normal pulses.   Pulmonary:      Effort: Pulmonary effort is normal.   Abdominal:      General: Bowel sounds are normal.      Palpations: Abdomen is soft. There is no mass.      Tenderness: There is no abdominal tenderness. There is no guarding.   Neurological:      Mental Status: He is alert and oriented to person, place, and time.        Result Review  Data Reviewed:{ Labs  Result Review  Imaging  Med Tab  Media :23}   The following data was reviewed by: Gage Pro III, NP-C on 09/22/2021  Lab Results - Last 18 Months   Lab Units 06/08/21  1557 03/06/21  0810 03/05/21  0556 03/02/21  1219 11/24/20  1211 06/02/20  1008 04/24/20  1013   GLUCOSE mg/dL 106*  --   --   --   --   --   --    BUN mg/dL 15 13 15   < > 16  --  15   CREATININE mg/dL 1.38* 1.38* 1.51*   < > 1.42*  --  1.30*   EGFR IF NONAFRICN AM mL/min/1.73 49*  --   --   --   --   --   --    EGFR IF AFRICN AM mL/min/1.73 59* 59* 53*   < > 57*  --  64   SODIUM mmol/L 145 142 141   < > 141  --  141   POTASSIUM mmol/L 4.3 4.3 " "4.2   < > 4.2  --  4.3   CHLORIDE mmol/L 105 108* 106   < > 102  --  101   CALCIUM mg/dL 9.6 8.1* 8.5*   < > 9.1  --  9.2   ALBUMIN g/dL 4.30  --   --   --  4.20  --  4.00   BILIRUBIN mg/dL 0.2  --   --   --  0.4  --  0.2   ALK PHOS U/L 65  --   --   --  60  --  51   AST (SGOT) U/L 12  --   --   --  14  --  7   ALT (SGPT) U/L 15  --   --   --  11  --  8   CHOLESTEROL mg/dL 156  --   --   --   --   --   --    TRIGLYCERIDES mg/dL 90  --   --   --  86  --  91   HDL CHOL mg/dL 68*  --   --   --  74*  --  67*   VLDL CHOL mg/dL  --   --   --   --  16  --  18.2   VLDL CHOLESTEROL SANDRA mg/dL 17  --   --   --   --   --   --    LDL CHOL mg/dL 71  --   --   --  69  --  68   LDL/HDL RATIO  1.03  --   --   --  0.92  --  1.01   HEMOGLOBIN A1C % 5.80*  --   --   --  7.71*  --  5.90*   MICROALB UR mg/L  --   --   --   --  100.0*  --   --    WBC 10*3/mm3 6.51 9.55 12.22*   < > 7.50   < > 6.60   RBC 10*6/mm3 4.11* 3.53* 3.65*   < > 4.49   < > 4.83   HEMATOCRIT % 33.0* 27.9* 28.8*   < > 34.7*   < > 37.6   MCV fL 80.3 79.0 78.9*   < > 77.4*   < > 77.9*   MCH pg 24.3* 24.4* 23.8*   < > 24.0*   < > 23.7*   PSA ng/mL  --   --   --   --   --   --  0.901   URIC ACID mg/dL 6.1  --   --   --   --   --   --     < > = values in this interval not displayed.          Vital Signs:   /82 (BP Location: Left arm, Patient Position: Sitting, Cuff Size: Adult)   Pulse 83   Temp 98 °F (36.7 °C) (Temporal)   Resp 16   Ht 170.2 cm (67\")   Wt 67.1 kg (148 lb)   SpO2 99%   BMI 23.18 kg/m²         Requested Prescriptions      No prescriptions requested or ordered in this encounter     Routine medications provided by this office will also be refilled via pharmacy request.       Current Outpatient Medications:   •  ALBUTEROL SULFATE  (90 Base) MCG/ACT inhaler, INHALE 2 PUFFS BY MOUTH EVERY 4 HOURS AS NEEDED (Patient taking differently: Inhale 2 puffs Every 4 (Four) Hours As Needed.), Disp: 42.5 g, Rfl: 0  •  amLODIPine (NORVASC) 10 MG tablet, " Take 1 tablet by mouth Daily., Disp: 90 tablet, Rfl: 1  •  aspirin (aspirin) 81 MG EC tablet, Take 1 tablet by mouth Daily., Disp: , Rfl:   •  ezetimibe (ZETIA) 10 MG tablet, Take 1 tablet by mouth Daily., Disp: 90 tablet, Rfl: 0  •  glimepiride (AMARYL) 2 MG tablet, TAKE 1 TABLET BY MOUTH EVERY MORNING BEFORE BREAKFAST, Disp: 90 tablet, Rfl: 1  •  glucose blood test strip, Test once daily, Disp: 30 each, Rfl: 12  •  linagliptin (Tradjenta) 5 MG tablet tablet, Take 1 tablet by mouth Daily., Disp: 90 tablet, Rfl: 1  •  lisinopril (PRINIVIL,ZESTRIL) 20 MG tablet, Take 1 tablet by mouth Daily., Disp: 90 tablet, Rfl: 0  •  metoprolol succinate XL (TOPROL-XL) 50 MG 24 hr tablet, Take 1 tablet by mouth Every Night., Disp: 30 tablet, Rfl: 5  •  Omega-3 Fatty Acids (FISH OIL) 1200 MG capsule capsule, Take 1,200 mg by mouth Every Night. HELD FOR OR, Disp: , Rfl:   •  pitavastatin calcium (Livalo) 2 MG tablet tablet, Take 1 tablet by mouth Every Night., Disp: 90 tablet, Rfl: 0  •  tadalafil (Cialis) 20 MG tablet, Take 1 tablet by mouth Daily., Disp: 9 tablet, Rfl: 5     Assessment and Plan:      Assessment and Plan {CC Problem List  Visit Diagnosis  ROS  Review (Popup)  Health Maintenance  Quality  BestPractice  Medications  SmartSets  SnapShot Encounters  Media :23}     Problem List Items Addressed This Visit     None      Visit Diagnoses     Functional diarrhea    -  Primary    will have him resume metamucil - will refer to GI (states he has not been to GI before)     Relevant Orders    CBC (No Diff)    Ambulatory Referral to Gastroenterology    Anemia, unspecified type        Last MCV 80.3 - if low on iron will start supplement which may slow bowel transit     Relevant Orders    CBC (No Diff)    Ferritin          Follow Up {Instructions Charge Capture  Follow-up Communications :23}     Return if symptoms worsen or fail to improve.    Patient was given instructions and counseling regarding his condition or  for health maintenance advice. Please see specific information pulled into the AVS if appropriate.    Lilian disclaimer:   Much of this encounter note is an electronic transcription/translation of spoken language to printed text. The electronic translation of spoken language may permit erroneous, or at times, nonsensical words or phrases to be inadvertently transcribed; Although I have reviewed the note for such errors, some may still exist.     Additional Patient Counseling:       Patient Instructions       Resume metamucil     You may take imodium as directed on package  - not to exceed 4 days.     I will make a referral to GI.  They will contact you to schedule an appointment.       We will also check your labs today to look at your blood count and will contact you with results.

## 2021-09-23 ENCOUNTER — TELEPHONE (OUTPATIENT)
Dept: INTERNAL MEDICINE | Facility: CLINIC | Age: 86
End: 2021-09-23

## 2021-09-23 LAB
ERYTHROCYTE [DISTWIDTH] IN BLOOD BY AUTOMATED COUNT: 12.8 % (ref 12.3–15.4)
FERRITIN SERPL-MCNC: 183 NG/ML (ref 30–400)
HCT VFR BLD AUTO: 38.1 % (ref 37.5–51)
HGB BLD-MCNC: 11.3 G/DL (ref 13–17.7)
MCH RBC QN AUTO: 24.3 PG (ref 26.6–33)
MCHC RBC AUTO-ENTMCNC: 29.7 G/DL (ref 31.5–35.7)
MCV RBC AUTO: 81.9 FL (ref 79–97)
PLATELET # BLD AUTO: 284 10*3/MM3 (ref 140–450)
RBC # BLD AUTO: 4.65 10*6/MM3 (ref 4.14–5.8)
WBC # BLD AUTO: 5.25 10*3/MM3 (ref 3.4–10.8)

## 2021-09-23 NOTE — TELEPHONE ENCOUNTER
Caller: Jazmín Lira    Relationship: Emergency Contact    Best call back number: [phone]    What is the best time to reach you: ANY    Who are you requesting to speak with (clinical staff, provider,  specific staff member): CLINICAL     Do you know the name of the person who called: PATIENTS WIFE JAZMÍN    What was the call regarding: PATIENTS WIFE STATED THAT SOMEONE CALLED LEFT MESSAGE ON HER VM TO CALL [phone] AND SHE HAS TRIED SEVERAL TIMES TO CALL BACK AND IT JUST RINGS AND NO ONE HAS EVER PICKED UP.      PATIENTS NEEDING TO SCHEDULE A GASTRO APPT AND PATIENTS WIFE WOULD LIKE TO GET A GOOD NUMBER TO GET APPT SCHEDULED.    Do you require a callback:YES

## 2021-09-24 RX ORDER — PITAVASTATIN CALCIUM 2.09 MG/1
TABLET, FILM COATED ORAL
Qty: 90 TABLET | Refills: 0 | Status: SHIPPED | OUTPATIENT
Start: 2021-09-24 | End: 2021-10-21

## 2021-09-29 ENCOUNTER — TELEPHONE (OUTPATIENT)
Dept: PEDIATRICS | Facility: OTHER | Age: 86
End: 2021-09-29

## 2021-09-29 NOTE — TELEPHONE ENCOUNTER
Caller: Sonja Lira    Relationship: Emergency Contact    Best call back number: [phone]    Who are you requesting to speak with (clinical staff, provider,  specific staff member): MELVI     What was the call regarding: PATIENT WOULD LIKE TO SPEAK TO MELVI ABOUT PREDNISONE PRESCRIPTION     Do you require a callback: YES

## 2021-10-11 ENCOUNTER — TELEPHONE (OUTPATIENT)
Dept: INTERNAL MEDICINE | Facility: CLINIC | Age: 86
End: 2021-10-11

## 2021-10-14 ENCOUNTER — OFFICE VISIT (OUTPATIENT)
Dept: INTERNAL MEDICINE | Facility: CLINIC | Age: 86
End: 2021-10-14

## 2021-10-14 VITALS
SYSTOLIC BLOOD PRESSURE: 118 MMHG | OXYGEN SATURATION: 100 % | WEIGHT: 142.6 LBS | TEMPERATURE: 97.8 F | HEIGHT: 67 IN | BODY MASS INDEX: 22.38 KG/M2 | DIASTOLIC BLOOD PRESSURE: 72 MMHG | RESPIRATION RATE: 16 BRPM | HEART RATE: 80 BPM

## 2021-10-14 DIAGNOSIS — Z23 NEEDS FLU SHOT: ICD-10-CM

## 2021-10-14 DIAGNOSIS — M10.9 GOUTY ARTHRITIS OF RIGHT GREAT TOE: Primary | ICD-10-CM

## 2021-10-14 PROCEDURE — 99213 OFFICE O/P EST LOW 20 MIN: CPT | Performed by: NURSE PRACTITIONER

## 2021-10-14 PROCEDURE — 90662 IIV NO PRSV INCREASED AG IM: CPT | Performed by: NURSE PRACTITIONER

## 2021-10-14 PROCEDURE — G0008 ADMIN INFLUENZA VIRUS VAC: HCPCS | Performed by: NURSE PRACTITIONER

## 2021-10-14 RX ORDER — METHYLPREDNISOLONE 4 MG/1
TABLET ORAL 2 TIMES DAILY
COMMUNITY
End: 2021-12-01

## 2021-10-14 NOTE — PATIENT INSTRUCTIONS
Low-Purine Eating Plan  A low-purine eating plan involves making food choices to limit your intake of purine. Purine is a kind of uric acid. Too much uric acid in your blood can cause certain conditions, such as gout and kidney stones. Eating a low-purine diet can help control these conditions.  What are tips for following this plan?  Reading food labels  · Avoid foods with saturated or Trans fat.  · Check the ingredient list of grains-based foods, such as bread and cereal, to make sure that they contain whole grains.  · Check the ingredient list of sauces or soups to make sure they do not contain meat or fish.  · When choosing soft drinks, check the ingredient list to make sure they do not contain high-fructose corn syrup.  Shopping    · Buy plenty of fresh fruits and vegetables.  · Avoid buying canned or fresh fish.  · Buy dairy products labeled as low-fat or nonfat.  · Avoid buying premade or processed foods. These foods are often high in fat, salt (sodium), and added sugar.    Cooking  · Use olive oil instead of butter when cooking. Oils like olive oil, canola oil, and sunflower oil contain healthy fats.  Meal planning  · Learn which foods do or do not affect you. If you find out that a food tends to cause your gout symptoms to flare up, avoid eating that food. You can enjoy foods that do not cause problems. If you have any questions about a food item, talk with your dietitian or health care provider.  · Limit foods high in fat, especially saturated fat. Fat makes it harder for your body to get rid of uric acid.  · Choose foods that are lower in fat and are lean sources of protein.  General guidelines  · Limit alcohol intake to no more than 1 drink a day for nonpregnant women and 2 drinks a day for men. One drink equals 12 oz of beer, 5 oz of wine, or 1½ oz of hard liquor. Alcohol can affect the way your body gets rid of uric acid.  · Drink plenty of water to keep your urine clear or pale yellow. Fluids can help  remove uric acid from your body.  · If directed by your health care provider, take a vitamin C supplement.  · Work with your health care provider and dietitian to develop a plan to achieve or maintain a healthy weight. Losing weight can help reduce uric acid in your blood.  What foods are recommended?  The items listed may not be a complete list. Talk with your dietitian about what dietary choices are best for you.  Foods low in purines  Foods low in purines do not need to be limited. These include:  · All fruits.  · All low-purine vegetables, pickles, and olives.  · Breads, pasta, rice, cornbread, and popcorn. Cake and other baked goods.  · All dairy foods.  · Eggs, nuts, and nut butters.  · Spices and condiments, such as salt, herbs, and vinegar.  · Plant oils, butter, and margarine.  · Water, sugar-free soft drinks, tea, coffee, and cocoa.  · Vegetable-based soups, broths, sauces, and gravies.  Foods moderate in purines  Foods moderate in purines should be limited to the amounts listed.  · ½ cup of asparagus, cauliflower, spinach, mushrooms, or green peas, each day.  · 2/3 cup uncooked oatmeal, each day.  · ¼ cup dry wheat bran or wheat germ, each day.  · 2-3 ounces of meat or poultry, each day.  · 4-6 ounces of shellfish, such as crab, lobster, oysters, or shrimp, each day.  · 1 cup cooked beans, peas, or lentils, each day.  · Soup, broths, or bouillon made from meat or fish. Limit these foods as much as possible.  What foods are not recommended?  The items listed may not be a complete list. Talk with your dietitian about what dietary choices are best for you.  Limit your intake of foods high in purines, including:  · Beer and other alcohol.  · Meat-based gravy or sauce.  · Canned or fresh fish, such as:  ? Anchovies, sardines, herring, and tuna.  ? Mussels and scallops.  ? Codfish, trout, and daniel.  · Constantino.  · Organ meats, such as:  ? Liver or kidney.  ? Tripe.  ? Sweetbreads (thymus gland or  pancreas).  · Wild game or goose.  · Yeast or yeast extract supplements.  · Drinks sweetened with high-fructose corn syrup.  Summary  · Eating a low-purine diet can help control conditions caused by too much uric acid in the body, such as gout or kidney stones.  · Choose low-purine foods, limit alcohol, and limit foods high in fat.  · You will learn over time which foods do or do not affect you. If you find out that a food tends to cause your gout symptoms to flare up, avoid eating that food.  This information is not intended to replace advice given to you by your health care provider. Make sure you discuss any questions you have with your health care provider.  Document Revised: 04/01/2021 Document Reviewed: 04/01/2021  Elsevier Patient Education © 2021 [employer] Inc[employer]

## 2021-10-14 NOTE — PROGRESS NOTES
"        Chief Complaint  Gout     Subjective:      History of Present Illness {CC  Problem List  Visit  Diagnosis   Encounters  Notes  Medications  Labs  Result Review Imaging  Media :23}     Gera Lira presents to [facility] for     He went to  on 9/25/2021: dx gout of right great toe.   He was treated with prednisone.     He had another flare last week - but at the same time, he was cutting hedge and got stung by bees and not on medrol pack.  Pain improved.     He does have CKD - last Cr=1.38    He states this is recurrent but I can not see where previous provider had checked uric acid.         I have reviewed patient's medical history, any new submitted information provided by patient or medical assistant and updated medical record.      Objective:      Physical Exam  Constitutional:       Appearance: Normal appearance.   Musculoskeletal:      Comments: R great toe - tender    Neurological:      Mental Status: He is oriented to person, place, and time.        Result Review  Data Reviewed:{ Labs  Result Review  Imaging  Med Tab  Media :23}                Vital Signs:   /72 (BP Location: Left arm, Patient Position: Sitting, Cuff Size: Adult)   Pulse 80   Temp 97.8 °F (36.6 °C) (Temporal)   Resp 16   Ht 170.2 cm (67\")   Wt 64.7 kg (142 lb 9.6 oz)   SpO2 100%   BMI 22.33 kg/m²         Requested Prescriptions      No prescriptions requested or ordered in this encounter       Routine medications provided by this office will also be refilled via pharmacy request.       Current Outpatient Medications:   •  ALBUTEROL SULFATE  (90 Base) MCG/ACT inhaler, INHALE 2 PUFFS BY MOUTH EVERY 4 HOURS AS NEEDED (Patient taking differently: Inhale 2 puffs Every 4 (Four) Hours As Needed.), Disp: 42.5 g, Rfl: 0  •  amLODIPine (NORVASC) 10 MG tablet, Take 1 tablet by mouth Daily., Disp: 90 tablet, Rfl: 1  •  aspirin (aspirin) 81 MG EC tablet, Take 1 tablet by mouth " Daily., Disp: , Rfl:   •  ezetimibe (ZETIA) 10 MG tablet, Take 1 tablet by mouth Daily., Disp: 90 tablet, Rfl: 0  •  glimepiride (AMARYL) 2 MG tablet, TAKE 1 TABLET BY MOUTH EVERY MORNING BEFORE BREAKFAST, Disp: 90 tablet, Rfl: 1  •  glucose blood test strip, Test once daily, Disp: 30 each, Rfl: 12  •  linagliptin (Tradjenta) 5 MG tablet tablet, Take 1 tablet by mouth Daily., Disp: 90 tablet, Rfl: 1  •  lisinopril (PRINIVIL,ZESTRIL) 20 MG tablet, Take 1 tablet by mouth Daily., Disp: 90 tablet, Rfl: 0  •  Livalo 2 MG tablet tablet, TAKE 1 TABLET BY MOUTH EVERY NIGHT, Disp: 90 tablet, Rfl: 0  •  methylPREDNISolone (MEDROL) 4 MG dose pack, Take  by mouth 2 (Two) Times a Day. Take as directed on package instructions., Disp: , Rfl:   •  metoprolol succinate XL (TOPROL-XL) 50 MG 24 hr tablet, Take 1 tablet by mouth Every Night., Disp: 30 tablet, Rfl: 5  •  Omega-3 Fatty Acids (FISH OIL) 1200 MG capsule capsule, Take 1,200 mg by mouth Every Night. HELD FOR OR, Disp: , Rfl:   •  tadalafil (Cialis) 20 MG tablet, Take 1 tablet by mouth Daily., Disp: 9 tablet, Rfl: 5     Assessment and Plan:      Assessment and Plan {CC Problem List  Visit Diagnosis  ROS  Review (Popup)  Health Maintenance  Quality  BestPractice  Medications  SmartSets  SnapShot Encounters  Media :23}     Problem List Items Addressed This Visit     None      Visit Diagnoses     Gouty arthritis of right great toe    -  Primary    Relevant Orders    Basic Metabolic Panel    Uric Acid    Needs flu shot        Relevant Orders    Fluzone High-Dose 65+yrs (0655-3025) (Completed)        I will check his uric acid. Discussed diet.   Based on results - may need to add low dose allopurinol.      Due for flu vaccine today.       Follow Up {Instructions Charge Capture  Follow-up Communications :23}     Return if symptoms worsen or fail to improve.    Patient was given instructions and counseling regarding his condition or for health maintenance advice.  Please see specific information pulled into the AVS if appropriate.    Lilian disclaimer:   Much of this encounter note is an electronic transcription/translation of spoken language to printed text. The electronic translation of spoken language may permit erroneous, or at times, nonsensical words or phrases to be inadvertently transcribed; Although I have reviewed the note for such errors, some may still exist.     Additional Patient Counseling:       Patient Instructions   Low-Purine Eating Plan  A low-purine eating plan involves making food choices to limit your intake of purine. Purine is a kind of uric acid. Too much uric acid in your blood can cause certain conditions, such as gout and kidney stones. Eating a low-purine diet can help control these conditions.  What are tips for following this plan?  Reading food labels  · Avoid foods with saturated or Trans fat.  · Check the ingredient list of grains-based foods, such as bread and cereal, to make sure that they contain whole grains.  · Check the ingredient list of sauces or soups to make sure they do not contain meat or fish.  · When choosing soft drinks, check the ingredient list to make sure they do not contain high-fructose corn syrup.  Shopping    · Buy plenty of fresh fruits and vegetables.  · Avoid buying canned or fresh fish.  · Buy dairy products labeled as low-fat or nonfat.  · Avoid buying premade or processed foods. These foods are often high in fat, salt (sodium), and added sugar.    Cooking  · Use olive oil instead of butter when cooking. Oils like olive oil, canola oil, and sunflower oil contain healthy fats.  Meal planning  · Learn which foods do or do not affect you. If you find out that a food tends to cause your gout symptoms to flare up, avoid eating that food. You can enjoy foods that do not cause problems. If you have any questions about a food item, talk with your dietitian or health care provider.  · Limit foods high in fat, especially  saturated fat. Fat makes it harder for your body to get rid of uric acid.  · Choose foods that are lower in fat and are lean sources of protein.  General guidelines  · Limit alcohol intake to no more than 1 drink a day for nonpregnant women and 2 drinks a day for men. One drink equals 12 oz of beer, 5 oz of wine, or 1½ oz of hard liquor. Alcohol can affect the way your body gets rid of uric acid.  · Drink plenty of water to keep your urine clear or pale yellow. Fluids can help remove uric acid from your body.  · If directed by your health care provider, take a vitamin C supplement.  · Work with your health care provider and dietitian to develop a plan to achieve or maintain a healthy weight. Losing weight can help reduce uric acid in your blood.  What foods are recommended?  The items listed may not be a complete list. Talk with your dietitian about what dietary choices are best for you.  Foods low in purines  Foods low in purines do not need to be limited. These include:  · All fruits.  · All low-purine vegetables, pickles, and olives.  · Breads, pasta, rice, cornbread, and popcorn. Cake and other baked goods.  · All dairy foods.  · Eggs, nuts, and nut butters.  · Spices and condiments, such as salt, herbs, and vinegar.  · Plant oils, butter, and margarine.  · Water, sugar-free soft drinks, tea, coffee, and cocoa.  · Vegetable-based soups, broths, sauces, and gravies.  Foods moderate in purines  Foods moderate in purines should be limited to the amounts listed.  · ½ cup of asparagus, cauliflower, spinach, mushrooms, or green peas, each day.  · 2/3 cup uncooked oatmeal, each day.  · ¼ cup dry wheat bran or wheat germ, each day.  · 2-3 ounces of meat or poultry, each day.  · 4-6 ounces of shellfish, such as crab, lobster, oysters, or shrimp, each day.  · 1 cup cooked beans, peas, or lentils, each day.  · Soup, broths, or bouillon made from meat or fish. Limit these foods as much as possible.  What foods are not  recommended?  The items listed may not be a complete list. Talk with your dietitian about what dietary choices are best for you.  Limit your intake of foods high in purines, including:  · Beer and other alcohol.  · Meat-based gravy or sauce.  · Canned or fresh fish, such as:  ? Anchovies, sardines, herring, and tuna.  ? Mussels and scallops.  ? Codfish, trout, and daniel.  · Constantino.  · Organ meats, such as:  ? Liver or kidney.  ? Tripe.  ? Sweetbreads (thymus gland or pancreas).  · Wild game or goose.  · Yeast or yeast extract supplements.  · Drinks sweetened with high-fructose corn syrup.  Summary  · Eating a low-purine diet can help control conditions caused by too much uric acid in the body, such as gout or kidney stones.  · Choose low-purine foods, limit alcohol, and limit foods high in fat.  · You will learn over time which foods do or do not affect you. If you find out that a food tends to cause your gout symptoms to flare up, avoid eating that food.  This information is not intended to replace advice given to you by your health care provider. Make sure you discuss any questions you have with your health care provider.  Document Revised: 04/01/2021 Document Reviewed: 04/01/2021  Elsevier Patient Education © 2021 Elsevier Inc.

## 2021-10-15 LAB
BUN SERPL-MCNC: 19 MG/DL (ref 8–23)
BUN/CREAT SERPL: 17.3 (ref 7–25)
CALCIUM SERPL-MCNC: 9.4 MG/DL (ref 8.6–10.5)
CHLORIDE SERPL-SCNC: 102 MMOL/L (ref 98–107)
CO2 SERPL-SCNC: 28.8 MMOL/L (ref 22–29)
CREAT SERPL-MCNC: 1.1 MG/DL (ref 0.76–1.27)
GLUCOSE SERPL-MCNC: 237 MG/DL (ref 65–99)
POTASSIUM SERPL-SCNC: 4.5 MMOL/L (ref 3.5–5.2)
SODIUM SERPL-SCNC: 141 MMOL/L (ref 136–145)
URATE SERPL-MCNC: 4.4 MG/DL (ref 3.4–7)

## 2021-10-21 RX ORDER — PITAVASTATIN CALCIUM 2.09 MG/1
TABLET, FILM COATED ORAL
Qty: 90 TABLET | Refills: 0 | Status: SHIPPED | OUTPATIENT
Start: 2021-10-21 | End: 2022-06-28

## 2021-10-26 DIAGNOSIS — E11.9 DIABETES MELLITUS WITHOUT COMPLICATION (HCC): ICD-10-CM

## 2021-10-26 DIAGNOSIS — I10 HYPERTENSION, ESSENTIAL: ICD-10-CM

## 2021-10-26 DIAGNOSIS — E78.2 MIXED HYPERLIPIDEMIA: ICD-10-CM

## 2021-10-26 DIAGNOSIS — R35.0 URINE FREQUENCY: ICD-10-CM

## 2021-10-26 RX ORDER — EZETIMIBE 10 MG/1
10 TABLET ORAL DAILY
Qty: 90 TABLET | Refills: 0 | Status: SHIPPED | OUTPATIENT
Start: 2021-10-26 | End: 2022-03-07

## 2021-11-02 ENCOUNTER — OFFICE VISIT (OUTPATIENT)
Dept: GASTROENTEROLOGY | Facility: CLINIC | Age: 86
End: 2021-11-02

## 2021-11-02 VITALS
SYSTOLIC BLOOD PRESSURE: 188 MMHG | HEART RATE: 64 BPM | BODY MASS INDEX: 22.01 KG/M2 | DIASTOLIC BLOOD PRESSURE: 76 MMHG | WEIGHT: 145.2 LBS | OXYGEN SATURATION: 96 % | HEIGHT: 68 IN | TEMPERATURE: 96.5 F

## 2021-11-02 DIAGNOSIS — R15.2 FECAL URGENCY: Primary | ICD-10-CM

## 2021-11-02 DIAGNOSIS — Z90.49 S/P LAPAROSCOPIC COLECTOMY: ICD-10-CM

## 2021-11-02 PROCEDURE — 99203 OFFICE O/P NEW LOW 30 MIN: CPT | Performed by: INTERNAL MEDICINE

## 2021-11-02 NOTE — PROGRESS NOTES
Chief Complaint   Patient presents with   • Diarrhea   • Colon Cancer     Subjective   HPI  Gera Lira is a 86 y.o. male who presents today for new patient evaluation.     He complains of post prandial urgency.  Symptoms present for last 6-8 mos.  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  Was taking Metamucil for a period of time this seemed to help but for some reason he decided to stop the Metamucil.  He then had some recurrence of his symptoms he is restarted Metamucil but has not returned back to his baseline.  Denies any blood or mucus in his stool.    Objective   Vitals:    11/02/21 1521   BP: (!) 188/76   Pulse: 64   Temp: 96.5 °F (35.8 °C)   SpO2: 96%     Physical Exam  Vitals reviewed.   Constitutional:       Appearance: He is well-developed.   HENT:      Head: Normocephalic and atraumatic.   Abdominal:      General: Bowel sounds are normal. There is no distension.      Palpations: Abdomen is soft. There is no mass.      Tenderness: There is no abdominal tenderness.      Hernia: No hernia is present.   Skin:     General: Skin is warm and dry.   Neurological:      Mental Status: He is alert and oriented to person, place, and time.   Psychiatric:         Behavior: Behavior normal.         Thought Content: Thought content normal.         Judgment: Judgment normal.              Assessment/Plan   Assessment:     1. Fecal urgency    2. 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  Since that time he has had some issues with fecal urgency which is benign sounding.  He did okay with Metamucil for a while but does not feel like this is been as helpful recently.  I would recommend that he try Citrucel as this may be more effective for fecal urgency.  I have advised the patient to give me an update in about 4 weeks if he still feels like his symptoms or not improve then  would consider a trial of a bile acid sequestrant.            Raffy Luong M.D.  [facility]  [address] [place] [address]  Office: [phone]

## 2021-12-01 ENCOUNTER — TELEPHONE (OUTPATIENT)
Dept: INTERNAL MEDICINE | Facility: CLINIC | Age: 86
End: 2021-12-01

## 2021-12-01 DIAGNOSIS — M10.9 ACUTE GOUT, UNSPECIFIED CAUSE, UNSPECIFIED SITE: Primary | ICD-10-CM

## 2021-12-01 RX ORDER — PREDNISONE 20 MG/1
40 TABLET ORAL DAILY
Qty: 10 TABLET | Refills: 0 | Status: SHIPPED | OUTPATIENT
Start: 2021-12-01 | End: 2021-12-06

## 2021-12-01 NOTE — TELEPHONE ENCOUNTER
Caller: Gera Lira    Relationship: Self    Best call back number: [phone] (H)    What medication are you requesting:SOMETHING FOR GOUT    What are your current symptoms:     How long have you been experiencing symptoms:     Have you had these symptoms before:    [x] Yes  [] No    Have you been treated for these symptoms before:   [x] Yes  [] No    If a prescription is needed, what is your preferred pharmacy and phone number: [facility] #[id number] - [address] - [phone] [facility] [phone] FX         Additional notes:        THANKS

## 2021-12-08 ENCOUNTER — OFFICE VISIT (OUTPATIENT)
Dept: INTERNAL MEDICINE | Facility: CLINIC | Age: 86
End: 2021-12-08

## 2021-12-08 VITALS
OXYGEN SATURATION: 98 % | DIASTOLIC BLOOD PRESSURE: 72 MMHG | RESPIRATION RATE: 16 BRPM | SYSTOLIC BLOOD PRESSURE: 118 MMHG | WEIGHT: 146 LBS | BODY MASS INDEX: 22.13 KG/M2 | TEMPERATURE: 96.8 F | HEIGHT: 68 IN | HEART RATE: 60 BPM

## 2021-12-08 DIAGNOSIS — I10 ESSENTIAL HYPERTENSION: Primary | Chronic | ICD-10-CM

## 2021-12-08 DIAGNOSIS — E11.59 TYPE 2 DIABETES MELLITUS WITH OTHER CIRCULATORY COMPLICATION, WITHOUT LONG-TERM CURRENT USE OF INSULIN (HCC): ICD-10-CM

## 2021-12-08 DIAGNOSIS — E78.5 DYSLIPIDEMIA: Chronic | ICD-10-CM

## 2021-12-08 DIAGNOSIS — I25.10 CORONARY ARTERY DISEASE INVOLVING NATIVE CORONARY ARTERY OF NATIVE HEART WITHOUT ANGINA PECTORIS: Chronic | ICD-10-CM

## 2021-12-08 DIAGNOSIS — M79.671 RIGHT FOOT PAIN: ICD-10-CM

## 2021-12-08 PROCEDURE — 99214 OFFICE O/P EST MOD 30 MIN: CPT | Performed by: NURSE PRACTITIONER

## 2021-12-08 NOTE — PROGRESS NOTES
Chief Complaint  Hypertension (Pt presents here today for a 6 month follow up ), Diabetes, and Gout (on toe )     Subjective:      History of Present Illness {CC  Problem List  Visit  Diagnosis   Encounters  Notes  Medications  Labs  Result Review Imaging  Media :23}     Gera Lira presents to [facility] for follow up:     1) hypertension: chronic.  No CP, SOA - continues norvasc (no edema), ACEI, BB    2) diabetes: chronic. Continues tradjenta, amaryl, last A1C= 5.8%, no hypoglycemic events.     3) right foot pain - he had gone to  in past and told it was gout.  It does respond to prednisone. I've checked uric acid twice - 6.1 and 4.4 most recently.           I have reviewed patient's medical history, any new submitted information provided by patient or medical assistant and updated medical record.      Objective:      Physical Exam  Vitals reviewed.   Constitutional:       Appearance: Normal appearance. He is well-developed.   HENT:      Head:      Comments: Wearing mask due to COVID   Neck:      Thyroid: No thyromegaly.   Cardiovascular:      Rate and Rhythm: Normal rate and regular rhythm.      Pulses: Normal pulses.      Heart sounds: Normal heart sounds.   Pulmonary:      Effort: Pulmonary effort is normal.      Breath sounds: Normal breath sounds.      Comments: E/U   Musculoskeletal:      Cervical back: Normal range of motion and neck supple.   Feet:      Comments: R foot - MTP tender with deep palpation.  Not warm to touch, no swelling.   Lymphadenopathy:      Cervical: No cervical adenopathy.   Skin:     General: Skin is warm and dry.   Neurological:      Mental Status: He is alert and oriented to person, place, and time.   Psychiatric:         Mood and Affect: Mood normal.         Behavior: Behavior normal. Behavior is cooperative.         Thought Content: Thought content normal.         Judgment: Judgment normal.        Result Review  Data  "Reviewed:{ Labs  Result Review  Imaging  Med Tab  Media :23}     The following data was reviewed by: Gage Pro III, NP-C on 12/08/2021  Common labs    Common Labsle 6/8/21 6/8/21 6/8/21 6/8/21 6/8/21 9/22/21 10/14/21 10/14/21    1557 1557 1557 1557 1557  1041 1041   Glucose 106 (A)      237 (A)    BUN 15      19    Creatinine 1.38 (A)      1.10    eGFR Non  Am 49 (A)      63    eGFR  Am 59 (A)      77    Sodium 145      141    Potassium 4.3      4.5    Chloride 105      102    Calcium 9.6      9.4    Total Protein 6.8          Albumin 4.30          Total Bilirubin 0.2          Alkaline Phosphatase 65          AST (SGOT) 12          ALT (SGPT) 15          WBC   6.51   5.25     Hemoglobin   10.0 (A)   11.3 (A)     Hematocrit   33.0 (A)   38.1     Platelets   271   284     Total Cholesterol  156         Triglycerides  90         HDL Cholesterol  68 (A)         LDL Cholesterol   71         Hemoglobin A1C    5.80 (A)       Uric Acid     6.1   4.4   (A) Abnormal value       Comments are available for some flowsheets but are not being displayed.                  Vital Signs:   /72 (BP Location: Left arm, Patient Position: Sitting, Cuff Size: Adult)   Pulse 60   Temp 96.8 °F (36 °C) (Temporal)   Resp 16   Ht 172.7 cm (68\")   Wt 66.2 kg (146 lb)   SpO2 98%   BMI 22.20 kg/m²         Requested Prescriptions      No prescriptions requested or ordered in this encounter       Routine medications provided by this office will also be refilled via pharmacy request.       Current Outpatient Medications:   •  ALBUTEROL SULFATE  (90 Base) MCG/ACT inhaler, INHALE 2 PUFFS BY MOUTH EVERY 4 HOURS AS NEEDED (Patient taking differently: Inhale 2 puffs Every 4 (Four) Hours As Needed.), Disp: 42.5 g, Rfl: 0  •  amLODIPine (NORVASC) 10 MG tablet, Take 1 tablet by mouth Daily., Disp: 90 tablet, Rfl: 1  •  aspirin (aspirin) 81 MG EC tablet, Take 1 tablet by mouth Daily., Disp: , Rfl:   •  " ezetimibe (ZETIA) 10 MG tablet, TAKE 1 TABLET BY MOUTH DAILY, Disp: 90 tablet, Rfl: 0  •  glimepiride (AMARYL) 2 MG tablet, TAKE 1 TABLET BY MOUTH EVERY MORNING BEFORE BREAKFAST, Disp: 90 tablet, Rfl: 1  •  glucose blood test strip, Test once daily, Disp: 30 each, Rfl: 12  •  linagliptin (Tradjenta) 5 MG tablet tablet, Take 1 tablet by mouth Daily., Disp: 90 tablet, Rfl: 1  •  lisinopril (PRINIVIL,ZESTRIL) 20 MG tablet, Take 1 tablet by mouth Daily., Disp: 90 tablet, Rfl: 0  •  Livalo 2 MG tablet tablet, TAKE 1 TABLET BY MOUTH EVERY NIGHT, Disp: 90 tablet, Rfl: 0  •  metoprolol succinate XL (TOPROL-XL) 50 MG 24 hr tablet, Take 1 tablet by mouth Every Night., Disp: 30 tablet, Rfl: 5  •  Omega-3 Fatty Acids (FISH OIL) 1200 MG capsule capsule, Take 1,200 mg by mouth Every Night. HELD FOR OR, Disp: , Rfl:   •  tadalafil (Cialis) 20 MG tablet, Take 1 tablet by mouth Daily., Disp: 9 tablet, Rfl: 5     Assessment and Plan:      Assessment and Plan {CC Problem List  Visit Diagnosis  ROS  Review (Popup)  Health Maintenance  Quality  BestPractice  Medications  SmartSets  SnapShot Encounters  Media :23}     Problem List Items Addressed This Visit        Cardiac and Vasculature    Coronary artery disease (Chronic)    Overview     Continues ASA and statin          Current Assessment & Plan     Coronary artery disease is improving with treatment.  No CP   Continue current medication          Dyslipidemia (Chronic)    Current Assessment & Plan     Continue current medication.     Lab Results   Component Value Date    CHOL 159 11/24/2020    CHLPL 156 06/08/2021    TRIG 90 06/08/2021    HDL 68 (H) 06/08/2021    LDL 71 06/08/2021            Essential hypertension - Primary (Chronic)    Current Assessment & Plan     Hypertension is improving with treatment.  Continue current treatment regimen.  Dietary sodium restriction.  Continue current medications.  Blood pressure will be reassessed at the next regular appointment.          Relevant Orders    Basic Metabolic Panel       Endocrine and Metabolic    Diabetes mellitus (HCC) (Chronic)    Current Assessment & Plan     Diabetes is improving with treatment.   Continue current treatment regimen.    Your last A1C (3 month average) =   Lab Results   Component Value Date    HGBA1C 5.80 (H) 06/08/2021      Your diabetes is Controlled.    Eye Health:   You need a diabetic eye exam yearly.   Please have a copy of the note faxed to my office.   Fax: [phone]    Foot Health:   You need a diabetic foot exam yearly.   Check your feet routinely for any wounds.   You should always check your shoes for any debris that could cause a wound.            Relevant Orders    Basic Metabolic Panel    Hemoglobin A1c      Other Visit Diagnoses     Right foot pain        Not consistent with gout. ? OA - will refer to podiatry.  Recurrent.     Relevant Orders    Ambulatory Referral to Podiatry          Follow Up {Instructions Charge Capture  Follow-up Communications :23}     Return in about 6 months (around 6/8/2022) for Medicare Wellness.    Patient was given instructions and counseling regarding his condition or for health maintenance advice. Please see specific information pulled into the AVS if appropriate.    Lilian disclaimer:   Much of this encounter note is an electronic transcription/translation of spoken language to printed text. The electronic translation of spoken language may permit erroneous, or at times, nonsensical words or phrases to be inadvertently transcribed; Although I have reviewed the note for such errors, some may still exist.     Additional Patient Counseling:       There are no Patient Instructions on file for this visit.

## 2021-12-08 NOTE — ASSESSMENT & PLAN NOTE
Continue current medication.     Lab Results   Component Value Date    CHOL 159 11/24/2020    CHLPL 156 06/08/2021    TRIG 90 06/08/2021    HDL 68 (H) 06/08/2021    LDL 71 06/08/2021

## 2021-12-08 NOTE — ASSESSMENT & PLAN NOTE
Diabetes is improving with treatment.   Continue current treatment regimen.    Your last A1C (3 month average) =   Lab Results   Component Value Date    HGBA1C 5.80 (H) 06/08/2021      Your diabetes is Controlled.    Eye Health:   You need a diabetic eye exam yearly.   Please have a copy of the note faxed to my office.   Fax: [phone]    Foot Health:   You need a diabetic foot exam yearly.   Check your feet routinely for any wounds.   You should always check your shoes for any debris that could cause a wound.

## 2021-12-09 LAB
BUN SERPL-MCNC: 20 MG/DL (ref 8–27)
BUN/CREAT SERPL: 14 (ref 10–24)
CALCIUM SERPL-MCNC: 9.1 MG/DL (ref 8.6–10.2)
CHLORIDE SERPL-SCNC: 101 MMOL/L (ref 96–106)
CO2 SERPL-SCNC: 27 MMOL/L (ref 20–29)
CREAT SERPL-MCNC: 1.4 MG/DL (ref 0.76–1.27)
GLUCOSE SERPL-MCNC: 238 MG/DL (ref 65–99)
HBA1C MFR BLD: 7.5 % (ref 4.8–5.6)
POTASSIUM SERPL-SCNC: 3.8 MMOL/L (ref 3.5–5.2)
SODIUM SERPL-SCNC: 141 MMOL/L (ref 134–144)

## 2021-12-13 NOTE — TELEPHONE ENCOUNTER
Caller: Gera Lira    Relationship: Self    Best call back number: [phone]     Requested Prescriptions:   Requested Prescriptions     Pending Prescriptions Disp Refills   • glucose blood test strip 30 each 12     Sig: Test once daily    LANCETS     Pharmacy where request should be sent: [facility] #[id number] [place] [id number] [facility] AT [address] [place] [address] - [phone]  - [phone] FX     Additional details provided by patient: PATIENT HAS NONE LEFT. PATIENT NOTED THERE ARE 5 NEEDLES IN EACH SET    Does the patient have less than a 3 day supply:  [x] Yes  [] No    Pj ALBARADO Rep   12/13/21 14:53 EST

## 2022-01-21 ENCOUNTER — TELEPHONE (OUTPATIENT)
Dept: INTERNAL MEDICINE | Facility: CLINIC | Age: 87
End: 2022-01-21

## 2022-01-21 RX ORDER — LISINOPRIL 20 MG/1
20 TABLET ORAL DAILY
Qty: 90 TABLET | Refills: 0 | Status: SHIPPED | OUTPATIENT
Start: 2022-01-21 | End: 2022-06-13

## 2022-01-21 NOTE — TELEPHONE ENCOUNTER
Caller: Gera Lira    Relationship: Self    Best call back number: [phone]    Requested Prescriptions:   Requested Prescriptions     Pending Prescriptions Disp Refills   • lisinopril (PRINIVIL,ZESTRIL) 20 MG tablet 90 tablet 0     Sig: Take 1 tablet by mouth Daily.        Pharmacy where request should be sent:    [facility] #[id number] [address] AT [address] [place] [address] - [phone]  - [phone]   [phone]      Does the patient have less than a 3 day supply:  [x] Yes  [] No    Pj Kulkarni Rep   01/21/22 14:42 EST

## 2022-01-25 DIAGNOSIS — E78.2 MIXED HYPERLIPIDEMIA: ICD-10-CM

## 2022-01-25 DIAGNOSIS — R35.0 URINE FREQUENCY: ICD-10-CM

## 2022-01-25 DIAGNOSIS — I10 HYPERTENSION, ESSENTIAL: ICD-10-CM

## 2022-01-25 DIAGNOSIS — E11.9 DIABETES MELLITUS WITHOUT COMPLICATION: ICD-10-CM

## 2022-01-25 RX ORDER — LINAGLIPTIN 5 MG/1
TABLET, FILM COATED ORAL
Qty: 90 TABLET | Refills: 1 | Status: SHIPPED | OUTPATIENT
Start: 2022-01-25 | End: 2023-01-03 | Stop reason: SDUPTHER

## 2022-02-22 DIAGNOSIS — E11.59 TYPE 2 DIABETES MELLITUS WITH OTHER CIRCULATORY COMPLICATION, WITHOUT LONG-TERM CURRENT USE OF INSULIN: ICD-10-CM

## 2022-02-22 RX ORDER — GLIMEPIRIDE 2 MG/1
2 TABLET ORAL
Qty: 90 TABLET | Refills: 1 | Status: SHIPPED | OUTPATIENT
Start: 2022-02-22 | End: 2022-09-15

## 2022-03-06 DIAGNOSIS — I10 HYPERTENSION, ESSENTIAL: ICD-10-CM

## 2022-03-06 DIAGNOSIS — E11.9 DIABETES MELLITUS WITHOUT COMPLICATION: ICD-10-CM

## 2022-03-06 DIAGNOSIS — E78.2 MIXED HYPERLIPIDEMIA: ICD-10-CM

## 2022-03-06 DIAGNOSIS — R35.0 URINE FREQUENCY: ICD-10-CM

## 2022-03-07 RX ORDER — EZETIMIBE 10 MG/1
10 TABLET ORAL DAILY
Qty: 90 TABLET | Refills: 3 | Status: SHIPPED | OUTPATIENT
Start: 2022-03-07 | End: 2023-01-03 | Stop reason: SDUPTHER

## 2022-03-16 ENCOUNTER — TELEPHONE (OUTPATIENT)
Dept: INTERNAL MEDICINE | Facility: CLINIC | Age: 87
End: 2022-03-16

## 2022-03-16 ENCOUNTER — OFFICE VISIT (OUTPATIENT)
Dept: INTERNAL MEDICINE | Facility: CLINIC | Age: 87
End: 2022-03-16

## 2022-03-16 VITALS
HEIGHT: 68 IN | TEMPERATURE: 98.2 F | OXYGEN SATURATION: 98 % | BODY MASS INDEX: 22.97 KG/M2 | SYSTOLIC BLOOD PRESSURE: 160 MMHG | WEIGHT: 151.6 LBS | HEART RATE: 75 BPM | DIASTOLIC BLOOD PRESSURE: 84 MMHG

## 2022-03-16 DIAGNOSIS — I10 ESSENTIAL HYPERTENSION: Primary | Chronic | ICD-10-CM

## 2022-03-16 DIAGNOSIS — I10 ESSENTIAL HYPERTENSION: Chronic | ICD-10-CM

## 2022-03-16 PROCEDURE — 99213 OFFICE O/P EST LOW 20 MIN: CPT | Performed by: NURSE PRACTITIONER

## 2022-03-16 RX ORDER — HYDRALAZINE HYDROCHLORIDE 25 MG/1
25 TABLET, FILM COATED ORAL 2 TIMES DAILY
Qty: 60 TABLET | Refills: 1 | Status: SHIPPED | OUTPATIENT
Start: 2022-03-16 | End: 2022-03-16

## 2022-03-16 RX ORDER — HYDRALAZINE HYDROCHLORIDE 25 MG/1
TABLET, FILM COATED ORAL
Qty: 180 TABLET | Refills: 2 | Status: SHIPPED | OUTPATIENT
Start: 2022-03-16

## 2022-03-16 NOTE — TELEPHONE ENCOUNTER
Called Patient and advised him to bring his medications and made him an appointment today @ 2:15 .

## 2022-03-16 NOTE — TELEPHONE ENCOUNTER
PATIENT CALLING IN REGARDS TO REQUEST THAT HIS lisinopril (PRINIVIL,ZESTRIL) 20 MG tabletmetoprolol succinate XL (TOPROL-XL) 50 MG 24 hr tablet amLODIPine (NORVASC) 10 MG tablet GET CHANGED FOR HIGH BLOOD PRESSURE. HIS BLOOD PRESSURE /81. PLEASE ADVISE THANK YOU!

## 2022-03-16 NOTE — PROGRESS NOTES
Chief Complaint  Hypertension and Headache     Subjective:      History of Present Illness {CC  Problem List  Visit  Diagnosis   Encounters  Notes  Medications  Labs  Result Review Imaging  Media :23}     Gera Lira presents to [facility] for:     Hypertension  This is a chronic problem. The current episode started more than 1 year ago. The problem is uncontrolled. Associated symptoms include headaches (Last 2-4 days, states improves when he takes APAP). Pertinent negatives include no chest pain, neck pain, palpitations, peripheral edema or shortness of breath. There are no associated agents (denies nsaids ) to hypertension. Current antihypertension treatment includes ACE inhibitors, calcium channel blockers and beta blockers. The current treatment provides mild improvement. There are no compliance problems.  Hypertensive end-organ damage includes kidney disease.      States voiding well.   He states he takes all BP medication at night.     He last saw cardiology about one year ago - note reviewed.  He was to follow up in 3 months but he did not make appointment.       I have reviewed patient's medical history, any new submitted information provided by patient or medical assistant and updated medical record.      Objective:      Physical Exam  Constitutional:       Appearance: Normal appearance.   Cardiovascular:      Rate and Rhythm: Normal rate and regular rhythm.      Pulses: Normal pulses.      Heart sounds: Normal heart sounds. No murmur heard.  Pulmonary:      Effort: Pulmonary effort is normal.      Breath sounds: Normal breath sounds.   Abdominal:      Palpations: Abdomen is soft.   Musculoskeletal:      Right lower leg: No edema.      Left lower leg: No edema.   Neurological:      Mental Status: He is alert and oriented to person, place, and time.        Result Review  Data Reviewed:{ Labs  Result Review  Imaging  Med Tab  Media :23}     The  "following data was reviewed by: Gage Pro III, NP-C on 03/16/2022  Common labs    Common Labsle 9/22/21 10/14/21 10/14/21 12/8/21 12/8/21     1041 1041 1142 1142   Glucose  237 (A)  238 (A)    BUN  19  20    Creatinine  1.10  1.40 (A)    eGFR Non  Am  63  45 (A)    eGFR  Am  77  52 (A)    Sodium  141  141    Potassium  4.5  3.8    Chloride  102  101    Calcium  9.4  9.1    WBC 5.25       Hemoglobin 11.3 (A)       Hematocrit 38.1       Platelets 284       Hemoglobin A1C     7.5 (A)   Uric Acid   4.4     (A) Abnormal value       Comments are available for some flowsheets but are not being displayed.                  Vital Signs:   /84 (BP Location: Left arm, Patient Position: Sitting, Cuff Size: Adult)   Pulse 75   Temp 98.2 °F (36.8 °C) (Temporal)   Ht 172.7 cm (68\")   Wt 68.8 kg (151 lb 9.6 oz)   SpO2 98%   BMI 23.05 kg/m²         Requested Prescriptions     Signed Prescriptions Disp Refills   • hydrALAZINE (APRESOLINE) 25 MG tablet 60 tablet 1     Sig: Take 1 tablet by mouth 2 (Two) Times a Day.       Routine medications provided by this office will also be refilled via pharmacy request.       Current Outpatient Medications:   •  ALBUTEROL SULFATE  (90 Base) MCG/ACT inhaler, INHALE 2 PUFFS BY MOUTH EVERY 4 HOURS AS NEEDED (Patient taking differently: Inhale 2 puffs Every 4 (Four) Hours As Needed.), Disp: 42.5 g, Rfl: 0  •  amLODIPine (NORVASC) 10 MG tablet, Take 1 tablet by mouth Daily., Disp: 90 tablet, Rfl: 1  •  aspirin (aspirin) 81 MG EC tablet, Take 1 tablet by mouth Daily., Disp: , Rfl:   •  ezetimibe (ZETIA) 10 MG tablet, TAKE 1 TABLET BY MOUTH DAILY, Disp: 90 tablet, Rfl: 3  •  glimepiride (AMARYL) 2 MG tablet, TAKE 1 TABLET BY MOUTH EVERY MORNING BEFORE BREAKFAST, Disp: 90 tablet, Rfl: 1  •  glucose blood test strip, Test once daily, Disp: 30 each, Rfl: 12  •  lisinopril (PRINIVIL,ZESTRIL) 20 MG tablet, Take 1 tablet by mouth Daily., Disp: 90 tablet, Rfl: " 0  •  Livalo 2 MG tablet tablet, TAKE 1 TABLET BY MOUTH EVERY NIGHT, Disp: 90 tablet, Rfl: 0  •  metoprolol succinate XL (TOPROL-XL) 50 MG 24 hr tablet, Take 1 tablet by mouth Every Night., Disp: 30 tablet, Rfl: 5  •  Omega-3 Fatty Acids (FISH OIL) 1200 MG capsule capsule, Take 1,200 mg by mouth Every Night. HELD FOR OR, Disp: , Rfl:   •  tadalafil (Cialis) 20 MG tablet, Take 1 tablet by mouth Daily., Disp: 9 tablet, Rfl: 5  •  Tradjenta 5 MG tablet tablet, TAKE 1 TABLET BY MOUTH DAILY, Disp: 90 tablet, Rfl: 1  •  hydrALAZINE (APRESOLINE) 25 MG tablet, Take 1 tablet by mouth 2 (Two) Times a Day., Disp: 60 tablet, Rfl: 1     Assessment and Plan:      Assessment and Plan {CC Problem List  Visit Diagnosis  ROS  Review (Popup)  Health Maintenance  Quality  BestPractice  Medications  SmartSets  SnapShot Encounters  Media :23}     Problem List Items Addressed This Visit        Cardiac and Vasculature    Essential hypertension - Primary (Chronic)    Current Assessment & Plan     Hypertension is worsening.  Dietary sodium restriction.  Regular aerobic exercise.  Medication changes per orders.  Blood pressure will be reassessed at the next regular appointment.           Relevant Medications    hydrALAZINE (APRESOLINE) 25 MG tablet    Other Relevant Orders    Basic Metabolic Panel        He has been checking BP at home.  Will add hydralazine BID due to prior CKD.    He will take lisinopril and BB in am and CCB at night.     I wrote this regimen down for him and gave to patient.     He will continue to monitor BP - If SBP not improved (goal, less than 140), he will notify me.  He will also schedule follow up with cardiology.     Low sodium diet: <2grams     Follow Up {Instructions Charge Capture  Follow-up Communications :23}     Return in about 3 months (around 6/16/2022) for fu htn .    Patient was given instructions and counseling regarding his condition or for health maintenance advice. Please see specific  information pulled into the AVS if appropriate.    Lilian disclaimer:   Much of this encounter note is an electronic transcription/translation of spoken language to printed text. The electronic translation of spoken language may permit erroneous, or at times, nonsensical words or phrases to be inadvertently transcribed; Although I have reviewed the note for such errors, some may still exist.     Additional Patient Counseling:       There are no Patient Instructions on file for this visit.

## 2022-03-16 NOTE — ASSESSMENT & PLAN NOTE
Hypertension is worsening.  Dietary sodium restriction.  Regular aerobic exercise.  Medication changes per orders.  Blood pressure will be reassessed at the next regular appointment.

## 2022-03-17 LAB
BUN SERPL-MCNC: 18 MG/DL (ref 8–27)
BUN/CREAT SERPL: 13 (ref 10–24)
CALCIUM SERPL-MCNC: 9 MG/DL (ref 8.6–10.2)
CHLORIDE SERPL-SCNC: 103 MMOL/L (ref 96–106)
CO2 SERPL-SCNC: 23 MMOL/L (ref 20–29)
CREAT SERPL-MCNC: 1.43 MG/DL (ref 0.76–1.27)
EGFRCR SERPLBLD CKD-EPI 2021: 48 ML/MIN/1.73
GLUCOSE SERPL-MCNC: 154 MG/DL (ref 65–99)
POTASSIUM SERPL-SCNC: 4.2 MMOL/L (ref 3.5–5.2)
SODIUM SERPL-SCNC: 143 MMOL/L (ref 134–144)

## 2022-05-09 RX ORDER — METOPROLOL SUCCINATE 50 MG/1
50 TABLET, EXTENDED RELEASE ORAL NIGHTLY
Qty: 30 TABLET | Refills: 5 | Status: SHIPPED | OUTPATIENT
Start: 2022-05-09 | End: 2022-11-28

## 2022-05-22 DIAGNOSIS — E78.2 MIXED HYPERLIPIDEMIA: ICD-10-CM

## 2022-05-22 DIAGNOSIS — R35.0 URINE FREQUENCY: ICD-10-CM

## 2022-05-22 DIAGNOSIS — I10 HYPERTENSION, ESSENTIAL: ICD-10-CM

## 2022-05-22 DIAGNOSIS — E11.9 DIABETES MELLITUS WITHOUT COMPLICATION: ICD-10-CM

## 2022-05-23 RX ORDER — AMLODIPINE BESYLATE 10 MG/1
10 TABLET ORAL DAILY
Qty: 90 TABLET | Refills: 1 | Status: SHIPPED | OUTPATIENT
Start: 2022-05-23 | End: 2023-01-03 | Stop reason: SDUPTHER

## 2022-06-02 ENCOUNTER — TELEPHONE (OUTPATIENT)
Dept: CARDIOLOGY | Facility: CLINIC | Age: 87
End: 2022-06-02

## 2022-06-02 NOTE — TELEPHONE ENCOUNTER
CALLED TO CHECK ON CLEARANCE THAT WAS FAXED 4/27.  I ASKED THEM TO REFAX AS I COULD NOT FIND THE ORIGINAL.  PATIENT IS HAVING SURGERY June 16TH.

## 2022-06-13 ENCOUNTER — TELEPHONE (OUTPATIENT)
Dept: CARDIOLOGY | Facility: CLINIC | Age: 87
End: 2022-06-13

## 2022-06-13 RX ORDER — LISINOPRIL 20 MG/1
20 TABLET ORAL DAILY
Qty: 90 TABLET | Refills: 2 | Status: SHIPPED | OUTPATIENT
Start: 2022-06-13 | End: 2022-06-21

## 2022-06-13 NOTE — TELEPHONE ENCOUNTER
Shu from [facility] is asking for cardiac Clearance for a procedure he is  Having on 6/16/22.      Will he need to hold his aspirin    Please advise

## 2022-06-14 ENCOUNTER — OFFICE VISIT (OUTPATIENT)
Dept: CARDIOLOGY | Facility: CLINIC | Age: 87
End: 2022-06-14

## 2022-06-14 VITALS
DIASTOLIC BLOOD PRESSURE: 82 MMHG | SYSTOLIC BLOOD PRESSURE: 144 MMHG | WEIGHT: 142.6 LBS | HEIGHT: 68 IN | HEART RATE: 68 BPM | OXYGEN SATURATION: 98 % | BODY MASS INDEX: 21.61 KG/M2

## 2022-06-14 DIAGNOSIS — I65.21 STENOSIS OF RIGHT CAROTID ARTERY: ICD-10-CM

## 2022-06-14 DIAGNOSIS — I10 ESSENTIAL HYPERTENSION: Chronic | ICD-10-CM

## 2022-06-14 DIAGNOSIS — I25.10 CORONARY ARTERY DISEASE INVOLVING NATIVE CORONARY ARTERY OF NATIVE HEART WITHOUT ANGINA PECTORIS: Primary | Chronic | ICD-10-CM

## 2022-06-14 DIAGNOSIS — I79.8 OTHER DISORDERS OF ARTERIES, ARTERIOLES AND CAPILLARIES IN DISEASES CLASSIFIED ELSEWHERE: ICD-10-CM

## 2022-06-14 DIAGNOSIS — E11.51 TYPE 2 DIABETES MELLITUS WITH PERIPHERAL VASCULAR DISEASE: ICD-10-CM

## 2022-06-14 PROCEDURE — 99214 OFFICE O/P EST MOD 30 MIN: CPT | Performed by: INTERNAL MEDICINE

## 2022-06-14 PROCEDURE — 93000 ELECTROCARDIOGRAM COMPLETE: CPT | Performed by: INTERNAL MEDICINE

## 2022-06-14 NOTE — PROGRESS NOTES
OFFICE VISIT      Date of Office Visit: 2022    Patient Name: Gera Lira  : 1935    Encounter Provider: Raj Vidales MD  Referring Provider: No ref. provider found  Primary Care Provider: Gage Pro III, NP-C  Place of Service: [facility] CARDIOLOGY        Chief Complaint   Patient presents with   • Coronary Artery Disease   • Follow-up   • Cardiac Clearance     Cysto w/penile biopsy     History of Present Illness    The patient is an 87-year-old black male with coronary artery disease, peripheral arterial disease, hypertension, diabetes mellitus who is here today for routine follow-up as well as for preoperative urologic surgery.    The patient underwent bypass surgery in .  He does not complain of any angina pectoris at this time.  His previous evaluation indicated a normal left ventricular systolic function.  He has not been hospitalized for any cardiac issues in the last 8 years.  The patient has peripheral arterial disease.  He was noted in 2017 to have a significant stenosis of his right coronary artery.  He has never had any neurologic events.  He does not complain of any claudication.  He denies any shortness of breath with exertion.  He really is asymptomatic at this time.    The patient has chronic kidney disease that appears to have been stable over the past year or so.  He also has diabetes mellitus that is inadequately controlled.  His last hemoglobin A1c was 7.5.    Past Medical History:   Diagnosis Date   • Adenoma of sigmoid colon    • Angina, class III (HCC)    • CKD (chronic kidney disease), stage III (HCC)    • Coronary artery disease    • Diabetes mellitus (HCC)    • Dyslipidemia    • Essential hypertension    • History of MI (myocardial infarction)    • History of prostate cancer     HX RADIATION    • Hyperlipidemia    • Hypertension    • S/P CABG (coronary artery bypass graft)          Past Surgical History:    Procedure Laterality Date   • CARDIAC CATHETERIZATION     • COLON RESECTION N/A 3/4/2021    Procedure: COLON RESECTION LAPAROSCOPIC SIGMOID WITH DAVINCI ROBOT, FLEXIBLE SIGMOIDOSCOPY;  Surgeon: Darek Adams MD;  Location: [facility] MAIN OR;  Service: DaVinc;  Laterality: N/A;   • COLONOSCOPY     • COLONOSCOPY N/A 12/29/2020    Procedure: COLONOSCOPY to cecum with cold polypectomies and hot snare polypectomy with tattoo;  Surgeon: Darek Adams MD;  Location: [facility] ENDOSCOPY;  Service: General;  Laterality: N/A;  pre - rectal bleeding  post - polyps, distal sigmoid mass   • CORONARY ARTERY BYPASS GRAFT  2014   • LUMBAR DISC SURGERY             Current Outpatient Medications:   •  ALBUTEROL SULFATE  (90 Base) MCG/ACT inhaler, INHALE 2 PUFFS BY MOUTH EVERY 4 HOURS AS NEEDED (Patient taking differently: Inhale 2 puffs Every 4 (Four) Hours As Needed.), Disp: 42.5 g, Rfl: 0  •  amLODIPine (NORVASC) 10 MG tablet, TAKE 1 TABLET BY MOUTH DAILY, Disp: 90 tablet, Rfl: 1  •  aspirin (aspirin) 81 MG EC tablet, Take 1 tablet by mouth Daily., Disp: , Rfl:   •  ezetimibe (ZETIA) 10 MG tablet, TAKE 1 TABLET BY MOUTH DAILY, Disp: 90 tablet, Rfl: 3  •  glimepiride (AMARYL) 2 MG tablet, TAKE 1 TABLET BY MOUTH EVERY MORNING BEFORE BREAKFAST, Disp: 90 tablet, Rfl: 1  •  glucose blood test strip, Test once daily, Disp: 30 each, Rfl: 12  •  hydrALAZINE (APRESOLINE) 25 MG tablet, TAKE 1 TABLET BY MOUTH TWICE DAILY, Disp: 180 tablet, Rfl: 2  •  lisinopril (PRINIVIL,ZESTRIL) 20 MG tablet, TAKE 1 TABLET BY MOUTH DAILY, Disp: 90 tablet, Rfl: 2  •  Livalo 2 MG tablet tablet, TAKE 1 TABLET BY MOUTH EVERY NIGHT, Disp: 90 tablet, Rfl: 0  •  metoprolol succinate XL (TOPROL-XL) 50 MG 24 hr tablet, TAKE 1 TABLET BY MOUTH EVERY NIGHT, Disp: 30 tablet, Rfl: 5  •  Omega-3 Fatty Acids (FISH OIL) 1200 MG capsule capsule, Take 1,200 mg by mouth Every Night. HELD FOR OR, Disp: , Rfl:   •  tadalafil (Cialis) 20 MG tablet, Take 1 tablet by  "mouth Daily., Disp: 9 tablet, Rfl: 5  •  Tradjenta 5 MG tablet tablet, TAKE 1 TABLET BY MOUTH DAILY, Disp: 90 tablet, Rfl: 1      Social History     Socioeconomic History   • Marital status:    Tobacco Use   • Smoking status: Former Smoker     Packs/day: 0.50     Years: 20.00     Pack years: 10.00     Types: Cigarettes     Quit date: 1970     Years since quittin.4   • Smokeless tobacco: Never Used   • Tobacco comment: Caffeine - Yes   Vaping Use   • Vaping Use: Never used   Substance and Sexual Activity   • Alcohol use: No   • Drug use: No   • Sexual activity: Defer         Review of Systems   Constitutional: Negative.   HENT: Negative.    Eyes: Negative.    Cardiovascular: Negative.    Respiratory: Negative.    Endocrine: Negative.    Skin: Negative.    Musculoskeletal: Negative.    Gastrointestinal: Negative.    Neurological: Negative.    Psychiatric/Behavioral: Negative.        Procedures      ECG 12 Lead    Date/Time: 2022 10:55 AM  Performed by: Raj Vidales MD  Authorized by: Raj Vidales MD   Comparison: compared with previous ECG from 2021  Similar to previous ECG  Rhythm: sinus rhythm  Rate: normal  Conduction: conduction normal  ST Segments: ST segments normal  QRS axis: right  Other findings: non-specific ST-T wave changes                Objective:    /82 (BP Location: Left arm, Patient Position: Sitting)   Pulse 68   Ht 172.7 cm (68\")   Wt 64.7 kg (142 lb 9.6 oz)   SpO2 98%   BMI 21.68 kg/m²         Vitals reviewed.   Constitutional:       Appearance: Healthy appearance. Well-developed and not in distress.   HENT:      Head: Normocephalic.   Neck:      Thyroid: No thyromegaly.      Vascular: No carotid bruit or JVD.   Pulmonary:      Effort: Pulmonary effort is normal.      Breath sounds: Normal breath sounds.   Cardiovascular:      Normal rate. Regular rhythm. Normal S1. Normal S2.      Murmurs: There is no murmur.      No gallop. No click. No rub. "   Pulses:     Carotid: 1+ bilaterally with bruit on the left.     Femoral: 1+ with bruit bilaterally.     Popliteal: 0 bilaterally.     Dorsalis pedis: 0 bilaterally.     Posterior tibial: 0 bilaterally.  Edema:     Peripheral edema absent.   Abdominal:      General: There is abdominal bruit.   Skin:     General: Skin is warm and dry.      Findings: No erythema.   Neurological:      Mental Status: Alert and oriented to person, place, and time.             Assessment & Plan:       Diagnosis Plan   1. Coronary artery disease involving native coronary artery of native heart without angina pectoris     2. Other disorders of arteries, arterioles and capillaries in diseases classified elsewhere ([facility])   Duplex Carotid Ultrasound CAR   3. Stenosis of right carotid artery     4. Type 2 diabetes mellitus with peripheral vascular disease ([facility])  Duplex Carotid Ultrasound CAR    Doppler Arterial Multi Level Lower Extremity - Bilateral CAR    US Abdomen Complete   5. Essential hypertension       1.  Coronary artery disease: Status post CABG 2014.  No angina pectoris  2.  Peripheral arterial disease: The patient has known carotid artery disease that has not been reevaluated since 2017.  His physical examination is also highly abnormal with an abdominal bruit, femoral artery bruits and diminished pulses distally.  We will place orders for studies.  3.  Diabetes mellitus, type II: Inadequately controlled.  4.  Hypertension: Controlled  5.  Tumor of glans penis: Surgery scheduled    The patient appears to be stable from a cardiac standpoint.  He does not have any modifiable risk factors at this point.  We will follow-up with vascular evaluation post surgery.

## 2022-06-21 ENCOUNTER — OFFICE VISIT (OUTPATIENT)
Dept: INTERNAL MEDICINE | Facility: CLINIC | Age: 87
End: 2022-06-21

## 2022-06-21 VITALS
BODY MASS INDEX: 21.7 KG/M2 | WEIGHT: 143.2 LBS | HEART RATE: 67 BPM | TEMPERATURE: 98 F | OXYGEN SATURATION: 100 % | SYSTOLIC BLOOD PRESSURE: 120 MMHG | HEIGHT: 68 IN | DIASTOLIC BLOOD PRESSURE: 82 MMHG

## 2022-06-21 DIAGNOSIS — I25.10 CORONARY ARTERY DISEASE INVOLVING NATIVE CORONARY ARTERY OF NATIVE HEART WITHOUT ANGINA PECTORIS: Chronic | ICD-10-CM

## 2022-06-21 DIAGNOSIS — Z92.3 HX OF RADIATION THERAPY: ICD-10-CM

## 2022-06-21 DIAGNOSIS — N18.31 STAGE 3A CHRONIC KIDNEY DISEASE: Chronic | ICD-10-CM

## 2022-06-21 DIAGNOSIS — I65.21 STENOSIS OF RIGHT CAROTID ARTERY: ICD-10-CM

## 2022-06-21 DIAGNOSIS — I10 ESSENTIAL HYPERTENSION: Chronic | ICD-10-CM

## 2022-06-21 DIAGNOSIS — H90.6 MIXED CONDUCTIVE AND SENSORINEURAL HEARING LOSS OF BOTH EARS: Chronic | ICD-10-CM

## 2022-06-21 DIAGNOSIS — E11.59 TYPE 2 DIABETES MELLITUS WITH OTHER CIRCULATORY COMPLICATION, WITHOUT LONG-TERM CURRENT USE OF INSULIN: Chronic | ICD-10-CM

## 2022-06-21 DIAGNOSIS — Z90.49 HISTORY OF COLON RESECTION: ICD-10-CM

## 2022-06-21 DIAGNOSIS — Z00.00 MEDICARE ANNUAL WELLNESS VISIT, SUBSEQUENT: Primary | ICD-10-CM

## 2022-06-21 DIAGNOSIS — E78.5 DYSLIPIDEMIA: Chronic | ICD-10-CM

## 2022-06-21 PROCEDURE — G0439 PPPS, SUBSEQ VISIT: HCPCS | Performed by: NURSE PRACTITIONER

## 2022-06-21 PROCEDURE — 99214 OFFICE O/P EST MOD 30 MIN: CPT | Performed by: NURSE PRACTITIONER

## 2022-06-21 PROCEDURE — 1126F AMNT PAIN NOTED NONE PRSNT: CPT | Performed by: NURSE PRACTITIONER

## 2022-06-21 PROCEDURE — 1170F FXNL STATUS ASSESSED: CPT | Performed by: NURSE PRACTITIONER

## 2022-06-21 PROCEDURE — 1159F MED LIST DOCD IN RCRD: CPT | Performed by: NURSE PRACTITIONER

## 2022-06-21 RX ORDER — LOSARTAN POTASSIUM 50 MG/1
50 TABLET ORAL DAILY
Qty: 30 TABLET | Refills: 5 | Status: SHIPPED | OUTPATIENT
Start: 2022-06-21 | End: 2022-11-04

## 2022-06-21 NOTE — PATIENT INSTRUCTIONS
GI put a note to repeat colonoscopy: 3/2022      I will send them a note to clarify.     ___________________________________________________________________________________    For living will information, please go to this website:     [email or web address]       Medicare Wellness  Personal Prevention Plan of Service     Date of Office Visit:    Encounter Provider:  Gage Pro III NP-C  Place of Service:  [facility] PRIMARY CARE  Patient Name: Gera Lira  :  1935    As part of the Medicare Wellness portion of your visit today, we are providing you with this personalized preventive plan of services (PPPS). This plan is based upon recommendations of the [place] Preventive Services Task Force (USPSTF) and the Advisory Committee on Immunization Practices (ACIP).    This lists the preventive care services that should be considered, and provides dates of when you are due. Items listed as completed are up-to-date and do not require any further intervention.    Health Maintenance   Topic Date Due    TDAP/TD VACCINES (1 - Tdap) Never done    ZOSTER VACCINE (1 of 2) Never done    DIABETIC EYE EXAM  Never done    Pneumococcal Vaccine 65+ (2 - PPSV23 or PCV20) 2018    COVID-19 Vaccine (3 - Booster for Moderna series) 2021    URINE MICROALBUMIN  2021    LIPID PANEL  2022    HEMOGLOBIN A1C  2022    INFLUENZA VACCINE  10/01/2022    ANNUAL WELLNESS VISIT  2023       Orders Placed This Encounter   Procedures    Comprehensive Metabolic Panel     Order Specific Question:   Release to patient     Answer:   Immediate    Lipid Panel With LDL / HDL Ratio     Order Specific Question:   Release to patient     Answer:   Immediate    CBC (No Diff)     Order Specific Question:   Release to patient     Answer:   Immediate    Hemoglobin A1c     Order Specific Question:   Release to patient     Answer:    Immediate       Return in about 6 months (around 12/21/2022).

## 2022-06-21 NOTE — ASSESSMENT & PLAN NOTE
Hypertension is improving with treatment.  Continue current treatment regimen.  Dietary sodium restriction.  Medication changes per orders.  Blood pressure will be reassessed at the next regular appointment.    Will stop his lisinopril and start losartan.   He monitors BP at home and he will continue to check and let me know if BP is not controlled or too low.     Note also given for him to take to his wife.

## 2022-06-21 NOTE — PROGRESS NOTES
The ABCs of the Annual Wellness Visit  Subsequent Medicare Wellness Visit    Chief Complaint   Patient presents with   • Medicare Wellness-subsequent   • Hypertension      Subjective    History of Present Illness:  Gera Lira is a 87 y.o. male who presents for a Subsequent Medicare Wellness Visit.    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 He has follow up tomorrow.     W[facility] sends note that [facility] would [facility] him from lisinopril to losartan due to chronic dry cough. Patient states non-productive.     Also: he was just seen by cardiology: Dr Vidales.   Femoral artery bruit and he has ordered vascular evaluation that is scheduled for 7/18/2022    He had  Dr Adams. LV 5/17/2021  He was to  have repeat colonoscopy 3/2022   He states diarrhea controlled as long as he takes fiber supplement.     The following portions of the patient's history were reviewed and   updated as appropriate: allergies, current medications, past family history, past medical history, past social history, past surgical history and problem list.    Compared to one year ago, the patient feels his physical   health is the same.    Compared to one year ago, the patient feels his mental   health is the same.    Recent Hospitalizations:  He was not admitted to the hospital during the last year.       Current Medical Providers:  Patient Care Team:  Gage Pro III, NP-C as PCP - General (Family Medicine)  Raj Vidales MD as Consulting Physician (Cardiology)    Outpatient Medications Prior to Visit   Medication Sig Dispense Refill   • ALBUTEROL SULFATE  (90 Base) MCG/ACT inhaler INHALE 2 PUFFS BY MOUTH EVERY 4 HOURS  AS NEEDED (Patient taking differently: Inhale 2 puffs Every 4 (Four) Hours As Needed.) 42.5 g 0   • amLODIPine (NORVASC) 10 MG tablet TAKE 1 TABLET BY MOUTH DAILY 90 tablet 1   • aspirin (aspirin) 81 MG EC tablet Take 1 tablet by mouth Daily.     • ezetimibe (ZETIA) 10 MG tablet TAKE 1 TABLET BY MOUTH DAILY 90 tablet 3   • glimepiride (AMARYL) 2 MG tablet TAKE 1 TABLET BY MOUTH EVERY MORNING BEFORE BREAKFAST 90 tablet 1   • glucose blood test strip Test once daily 30 each 12   • hydrALAZINE (APRESOLINE) 25 MG tablet TAKE 1 TABLET BY MOUTH TWICE DAILY 180 tablet 2   • Livalo 2 MG tablet tablet TAKE 1 TABLET BY MOUTH EVERY NIGHT 90 tablet 0   • metoprolol succinate XL (TOPROL-XL) 50 MG 24 hr tablet TAKE 1 TABLET BY MOUTH EVERY NIGHT 30 tablet 5   • Omega-3 Fatty Acids (FISH OIL) 1200 MG capsule capsule Take 1,200 mg by mouth Every Night. HELD FOR OR     • tadalafil (Cialis) 20 MG tablet Take 1 tablet by mouth Daily. 9 tablet 5   • Tradjenta 5 MG tablet tablet TAKE 1 TABLET BY MOUTH DAILY 90 tablet 1   • lisinopril (PRINIVIL,ZESTRIL) 20 MG tablet TAKE 1 TABLET BY MOUTH DAILY 90 tablet 2     No facility-administered medications prior to visit.       No opioid medication identified on active medication list. I have reviewed chart for other potential  high risk medication/s and harmful drug interactions in the elderly.          Aspirin is on active medication list. Aspirin use is indicated based on review of current medical condition/s. Pros and cons of this therapy have been discussed today. Benefits of this medication outweigh potential harm.  Patient has been encouraged to continue taking this medication.  .      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  ACP discussion was held with the patient during this visit. Patient does not have an advance directive, information provided.          Objective    Vitals:    22 1446   BP: 120/82   BP Location: Left arm   Patient Position: Sitting   Cuff Size: Adult   Pulse: 67   Temp: 98 °F (36.7 °C)   TempSrc: Temporal   SpO2: 100%   Weight: 65 kg (143 lb 3.2 oz)   Height: 172.7 cm (68\")   PainSc: 0-No pain     Estimated body mass index is 21.77 kg/m² as calculated from the following:    Height as of this encounter: 172.7 cm (68\").    Weight as of this encounter: 65 kg (143 lb 3.2 oz).    BMI is within normal parameters. No other follow-up for BMI required.      Does the patient have evidence of cognitive impairment? No    Physical Exam            HEALTH RISK ASSESSMENT    Smoking Status:  Social History     Tobacco Use   Smoking Status Former Smoker   • Packs/day: 0.50   • Years: 20.00   • Pack years: 10.00   • Types: Cigarettes   • Quit date: 1970   • Years since quittin.5   Smokeless Tobacco Never Used   Tobacco Comment    Caffeine - Yes     Alcohol Consumption:  Social History     Substance and Sexual Activity   Alcohol Use No     Fall Risk Screen:    STEADI Fall Risk Assessment was completed, and patient is at MODERATE risk for falls. Assessment completed on:2022    Depression Screening:  PHQ-2/PHQ-9 Depression Screening 2022   Retired PHQ-9 Total Score -   Retired Total Score -   Little Interest or Pleasure in Doing Things 0-->not at all   Feeling Down, Depressed or Hopeless 0-->not at all   PHQ-9: Brief Depression Severity Measure Score 0       Health Habits and Functional and Cognitive Screening:  Functional & Cognitive Status 2022   Do you have difficulty preparing food and eating? No   Do you have " difficulty bathing yourself, getting dressed or grooming yourself? No   Do you have difficulty using the toilet? No   Do you have difficulty moving around from place to place? No   Do you have trouble with steps or getting out of a bed or a chair? No   Current Diet Well Balanced Diet   Dental Exam Up to date   Eye Exam Up to date   Exercise (times per week) 0 times per week   Current Exercises Include No Regular Exercise   Current Exercise Activities Include -   Do you need help using the phone?  No   Are you deaf or do you have serious difficulty hearing?  Yes   Do you need help with transportation? No   Do you need help shopping? No   Do you need help preparing meals?  No   Do you need help with housework?  No   Do you need help with laundry? No   Do you need help taking your medications? No   Do you need help managing money? No   Do you ever drive or ride in a car without wearing a seat belt? Yes   Have you felt unusual stress, anger or loneliness in the last month? Yes   Who do you live with? Spouse   If you need help, do you have trouble finding someone available to you? No   Have you been bothered in the last four weeks by sexual problems? No   Do you have difficulty concentrating, remembering or making decisions? No       Age-appropriate Screening Schedule:  Refer to the list below for future screening recommendations based on patient's age, sex and/or medical conditions. Orders for these recommended tests are listed in the plan section. The patient has been provided with a written plan.    Health Maintenance   Topic Date Due   • TDAP/TD VACCINES (1 - Tdap) Never done   • ZOSTER VACCINE (1 of 2) Never done   • DIABETIC EYE EXAM  Never done   • URINE MICROALBUMIN  11/24/2021   • LIPID PANEL  06/08/2022   • HEMOGLOBIN A1C  06/08/2022   • INFLUENZA VACCINE  10/01/2022              Assessment & Plan   CMS Preventative Services Quick Reference  Risk Factors Identified During Encounter  Immunizations  Discussed/Encouraged (specific Immunizations; Shingrix  The above risks/problems have been discussed with the patient.  Follow up actions/plans if indicated are seen below in the Assessment/Plan Section.  Pertinent information has been shared with the patient in the After Visit Summary.    Problem List Items Addressed This Visit        Cardiac and Vasculature    Coronary artery disease (Chronic)    Overview     Continues ASA and statin            Relevant Orders    Lipid Panel With LDL / HDL Ratio    Dyslipidemia (Chronic)    Current Assessment & Plan     Lab Results   Component Value Date    CHOL 159 11/24/2020    CHLPL 156 06/08/2021    TRIG 90 06/08/2021    HDL 68 (H) 06/08/2021    LDL 71 06/08/2021     Controlled - continue statin            Relevant Orders    Lipid Panel With LDL / HDL Ratio    Essential hypertension (Chronic)    Current Assessment & Plan     Hypertension is improving with treatment.  Continue current treatment regimen.  Dietary sodium restriction.  Medication changes per orders.  Blood pressure will be reassessed at the next regular appointment.    Will stop his lisinopril and start losartan.   He monitors BP at home and he will continue to check and let me know if BP is not controlled or too low.     Note also given for him to take to his wife.            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   Continue current treatment regimen.  Discussed foot care.  Reminded to get yearly retinal exam.  Diabetes will be reassessed in 6 months.    Your last A1C (3 month average) =   Lab Results   Component Value Date    HGBA1C 7.5 (H)  12/08/2021      Goal: A1C less than 7% - recheck today.     Eye Health:   You need a diabetic eye exam yearly.   Please have a copy of the note faxed to my office.   Fax: [phone]    Foot Health:   You need a diabetic foot exam yearly.   Check your feet routinely for any wounds.   You should always check your shoes for any debris that could cause a wound.              Relevant Orders    Comprehensive Metabolic Panel    Hemoglobin A1c       Gastrointestinal Abdominal     History of colon resection / unresectable colon polyp    Overview     S/p sigmoid colectomy.     -Plan for repeat colonoscopy March 2022 per Dr Adams            Current Assessment & Plan     Will send a note to Dr Adams - patient does recall being told that needed repeat colonoscopy this year.     Denies GI symptoms           Relevant Orders    Ambulatory Referral For Screening Colonoscopy       Genitourinary and Reproductive     Stage 3a chronic kidney disease (HCC) (Chronic)    Current Assessment & Plan     Lab Results   Component Value Date    CREATININE 1.43 (H) 03/16/2022        Avoid nephrotoxic medications - especially nsaids (like Ibuprofen, aleve)   Maintain blood pressure control  Maintain blood sugar control              Relevant Orders    Comprehensive Metabolic Panel       Hematology and Neoplasia    Hx of radiation therapy      Other Visit Diagnoses     Medicare annual wellness visit, subsequent    -  Primary        Follow Up:   Return in about 6 months (around 12/21/2022).     An After Visit Summary and PPPS were made available to the patient.

## 2022-06-21 NOTE — ASSESSMENT & PLAN NOTE
Lab Results   Component Value Date    CREATININE 1.43 (H) 03/16/2022        Avoid nephrotoxic medications - especially nsaids (like Ibuprofen, aleve)   Maintain blood pressure control  Maintain blood sugar control

## 2022-06-21 NOTE — ASSESSMENT & PLAN NOTE
Lab Results   Component Value Date    CHOL 159 11/24/2020    CHLPL 156 06/08/2021    TRIG 90 06/08/2021    HDL 68 (H) 06/08/2021    LDL 71 06/08/2021     Controlled - continue statin

## 2022-06-21 NOTE — ASSESSMENT & PLAN NOTE
Will send a note to Dr Adams - patient does recall being told that needed repeat colonoscopy this year.     Denies GI symptoms

## 2022-06-21 NOTE — ASSESSMENT & PLAN NOTE
Diabetes is unchanged.   Continue current treatment regimen.  Discussed foot care.  Reminded to get yearly retinal exam.  Diabetes will be reassessed in 6 months.    Your last A1C (3 month average) =   Lab Results   Component Value Date    HGBA1C 7.5 (H) 12/08/2021      Goal: A1C less than 7% - recheck today.     Eye Health:   You need a diabetic eye exam yearly.   Please have a copy of the note faxed to my office.   Fax: [phone]    Foot Health:   You need a diabetic foot exam yearly.   Check your feet routinely for any wounds.   You should always check your shoes for any debris that could cause a wound.

## 2022-06-22 LAB
ALBUMIN SERPL-MCNC: 4.3 G/DL (ref 3.6–4.6)
ALBUMIN/GLOB SERPL: 1.8 {RATIO} (ref 1.2–2.2)
ALP SERPL-CCNC: 59 IU/L (ref 44–121)
ALT SERPL-CCNC: 15 IU/L (ref 0–44)
AST SERPL-CCNC: 13 IU/L (ref 0–40)
BILIRUB SERPL-MCNC: <0.2 MG/DL (ref 0–1.2)
BUN SERPL-MCNC: 22 MG/DL (ref 8–27)
BUN/CREAT SERPL: 14 (ref 10–24)
CALCIUM SERPL-MCNC: 9.4 MG/DL (ref 8.6–10.2)
CHLORIDE SERPL-SCNC: 104 MMOL/L (ref 96–106)
CHOLEST SERPL-MCNC: 160 MG/DL (ref 100–199)
CO2 SERPL-SCNC: 27 MMOL/L (ref 20–29)
CREAT SERPL-MCNC: 1.58 MG/DL (ref 0.76–1.27)
EGFRCR SERPLBLD CKD-EPI 2021: 42 ML/MIN/1.73
ERYTHROCYTE [DISTWIDTH] IN BLOOD BY AUTOMATED COUNT: 12 % (ref 11.6–15.4)
GLOBULIN SER CALC-MCNC: 2.4 G/DL (ref 1.5–4.5)
GLUCOSE SERPL-MCNC: 128 MG/DL (ref 65–99)
HBA1C MFR BLD: 6.1 % (ref 4.8–5.6)
HCT VFR BLD AUTO: 37.4 % (ref 37.5–51)
HDLC SERPL-MCNC: 75 MG/DL
HGB BLD-MCNC: 11.5 G/DL (ref 13–17.7)
LDLC SERPL CALC-MCNC: 68 MG/DL (ref 0–99)
LDLC/HDLC SERPL: 0.9 RATIO (ref 0–3.6)
MCH RBC QN AUTO: 24.9 PG (ref 26.6–33)
MCHC RBC AUTO-ENTMCNC: 30.7 G/DL (ref 31.5–35.7)
MCV RBC AUTO: 81 FL (ref 79–97)
PLATELET # BLD AUTO: 297 X10E3/UL (ref 150–450)
POTASSIUM SERPL-SCNC: 4.9 MMOL/L (ref 3.5–5.2)
PROT SERPL-MCNC: 6.7 G/DL (ref 6–8.5)
RBC # BLD AUTO: 4.61 X10E6/UL (ref 4.14–5.8)
SODIUM SERPL-SCNC: 143 MMOL/L (ref 134–144)
TRIGL SERPL-MCNC: 92 MG/DL (ref 0–149)
VLDLC SERPL CALC-MCNC: 17 MG/DL (ref 5–40)
WBC # BLD AUTO: 7.1 X10E3/UL (ref 3.4–10.8)

## 2022-06-28 RX ORDER — PITAVASTATIN CALCIUM 2.09 MG/1
TABLET, FILM COATED ORAL
Qty: 90 TABLET | Refills: 1 | Status: SHIPPED | OUTPATIENT
Start: 2022-06-28 | End: 2023-01-03 | Stop reason: SDUPTHER

## 2022-07-15 ENCOUNTER — TELEPHONE (OUTPATIENT)
Dept: INTERNAL MEDICINE | Facility: CLINIC | Age: 87
End: 2022-07-15

## 2022-07-15 NOTE — TELEPHONE ENCOUNTER
Caller: ERICK    Relationship: DAUGHTER    Best call back number: [phone]    What medication are you requesting: FREESTOctavian BRAYDEN SENSOR/MONITOR    If a prescription is needed, what is your preferred pharmacy and phone number: [id number] - [place], [place] - [address] - [phone] [facility] [phone]      Additional notes: PATIENT'S DAUGHTER IS REQUESTING THIS BECAUSE PATIENT HAS NOT BEEN CHECKING HIS SUGAR SINCE HE DOES NOT LIKE TO PRICK HIS FINGER. PATIENT'S DAUGHTER STATED THAT SHE WANTS TO GET HIM THE ONE THAT GOES ON HIS ARM AND SHE WOULD HELP HIM TO START CHECKING HIS SUGAR. PATIENT'S DAUGHTER STATED THAT PATIENT'S WIFE IS IN THE EMERGENCY ROOM RIGHT NOW FOR LOW BLOOD SUGAR SINCE SHE HAD NOT BEEN CHECKING IT AND SHE WANTS TO PREVENT PATIENT FROM EXPERIENCING THE SAME THING. PLEASE ADVISE.

## 2022-07-15 NOTE — TELEPHONE ENCOUNTER
HUB- please schedule a visit. This is DME and will need an office note to show medical necessity to begin the process. Typically this cannot be approved unless the patient is on Insulin. He may need a medication adjustment.

## 2022-07-18 ENCOUNTER — APPOINTMENT (OUTPATIENT)
Dept: CARDIOLOGY | Facility: HOSPITAL | Age: 87
End: 2022-07-18

## 2022-07-18 ENCOUNTER — APPOINTMENT (OUTPATIENT)
Dept: ULTRASOUND IMAGING | Facility: HOSPITAL | Age: 87
End: 2022-07-18

## 2022-07-18 ENCOUNTER — HOSPITAL ENCOUNTER (OUTPATIENT)
Dept: CARDIOLOGY | Facility: HOSPITAL | Age: 87
End: 2022-07-18

## 2022-08-23 ENCOUNTER — OFFICE VISIT (OUTPATIENT)
Dept: INTERNAL MEDICINE | Facility: CLINIC | Age: 87
End: 2022-08-23

## 2022-08-23 VITALS
BODY MASS INDEX: 21.7 KG/M2 | WEIGHT: 143.2 LBS | HEIGHT: 68 IN | DIASTOLIC BLOOD PRESSURE: 80 MMHG | SYSTOLIC BLOOD PRESSURE: 132 MMHG | OXYGEN SATURATION: 99 % | HEART RATE: 67 BPM | TEMPERATURE: 98.9 F

## 2022-08-23 DIAGNOSIS — R30.0 DYSURIA: Primary | ICD-10-CM

## 2022-08-23 DIAGNOSIS — R82.81 PYURIA: ICD-10-CM

## 2022-08-23 PROBLEM — N40.0 UNSPECIFIED HYPERPLASIA OF PROSTATE WITHOUT URINARY OBSTRUCTION AND OTHER LOWER URINARY TRACT SYMPTOMS (LUTS): Status: ACTIVE | Noted: 2022-08-23

## 2022-08-23 PROCEDURE — 99213 OFFICE O/P EST LOW 20 MIN: CPT | Performed by: NURSE PRACTITIONER

## 2022-08-23 PROCEDURE — 81003 URINALYSIS AUTO W/O SCOPE: CPT | Performed by: NURSE PRACTITIONER

## 2022-08-23 RX ORDER — CIPROFLOXACIN 250 MG/1
250 TABLET, FILM COATED ORAL 2 TIMES DAILY
Qty: 14 TABLET | Refills: 0 | Status: SHIPPED | OUTPATIENT
Start: 2022-08-23 | End: 2022-08-30

## 2022-08-23 NOTE — PROGRESS NOTES
Chief Complaint  Difficulty Urinating     Subjective:      History of Present Illness {CC  Problem List  Visit  Diagnosis   Encounters  Notes  Medications  Labs  Result Review Imaging  Media :23}     Gera Lira presents to [facility] for:        Urinary Tract Infection   This is a new problem. The current episode started in the past 7 days. The problem has been gradually worsening. There has been no fever. Associated symptoms include frequency, hesitancy and urgency. Pertinent negatives include no flank pain, hematuria or vomiting.      Hx: prostate cancer and penile cancer (SCCa CIS)     I have reviewed patient's medical history, any new submitted information provided by patient or medical assistant and updated medical record.      Objective:      Physical Exam  Vitals reviewed.   Constitutional:       Appearance: Normal appearance. He is well-developed.   HENT:      Head:      Comments: Wearing mask due to COVID      Ears:      Comments: [place]  Neck:      Thyroid: No thyromegaly.   Cardiovascular:      Rate and Rhythm: Normal rate and regular rhythm.      Pulses: Normal pulses.      Heart sounds: Normal heart sounds.   Pulmonary:      Effort: Pulmonary effort is normal.      Breath sounds: Normal breath sounds.      Comments: E/U   Abdominal:      Tenderness: There is no abdominal tenderness. There is no right CVA tenderness or left CVA tenderness.      Hernia: No hernia is present.   Neurological:      Mental Status: He is alert and oriented to person, place, and time.   Psychiatric:         Mood and Affect: Mood normal.         Behavior: Behavior normal. Behavior is cooperative.         Thought Content: Thought content normal.         Judgment: Judgment normal.        Result Review  Data Reviewed:{ Labs  Result Review  Imaging  Med Tab  Media :23}     The following data was reviewed by: Gage Pro III, NP-C on 08/23/2022  UA    Urinalysis  "8/24/22   Ketones, UA Negative   Leukocytes, UA Trace (A)   (A) Abnormal value                   Vital Signs:   /80 (BP Location: Left arm, Patient Position: Sitting, Cuff Size: Adult)   Pulse 67   Temp 98.9 °F (37.2 °C) (Temporal)   Ht 172.7 cm (68\")   Wt 65 kg (143 lb 3.2 oz)   SpO2 99%   BMI 21.77 kg/m²         Requested Prescriptions     Signed Prescriptions Disp Refills   • ciprofloxacin (Cipro) 250 MG tablet 14 tablet 0     Sig: Take 1 tablet by mouth 2 (Two) Times a Day for 7 days.       Routine medications provided by this office will also be refilled via pharmacy request.       Current Outpatient Medications:   •  ALBUTEROL SULFATE  (90 Base) MCG/ACT inhaler, INHALE 2 PUFFS BY MOUTH EVERY 4 HOURS AS NEEDED (Patient taking differently: Inhale 2 puffs Every 4 (Four) Hours As Needed.), Disp: 42.5 g, Rfl: 0  •  amLODIPine (NORVASC) 10 MG tablet, TAKE 1 TABLET BY MOUTH DAILY, Disp: 90 tablet, Rfl: 1  •  aspirin (aspirin) 81 MG EC tablet, Take 1 tablet by mouth Daily., Disp: , Rfl:   •  ezetimibe (ZETIA) 10 MG tablet, TAKE 1 TABLET BY MOUTH DAILY, Disp: 90 tablet, Rfl: 3  •  glimepiride (AMARYL) 2 MG tablet, TAKE 1 TABLET BY MOUTH EVERY MORNING BEFORE BREAKFAST, Disp: 90 tablet, Rfl: 1  •  glucose blood test strip, Test once daily, Disp: 30 each, Rfl: 12  •  hydrALAZINE (APRESOLINE) 25 MG tablet, TAKE 1 TABLET BY MOUTH TWICE DAILY, Disp: 180 tablet, Rfl: 2  •  Livalo 2 MG tablet tablet, TAKE 1 TABLET BY MOUTH EVERY NIGHT, Disp: 90 tablet, Rfl: 1  •  losartan (Cozaar) 50 MG tablet, Take 1 tablet by mouth Daily., Disp: 30 tablet, Rfl: 5  •  metoprolol succinate XL (TOPROL-XL) 50 MG 24 hr tablet, TAKE 1 TABLET BY MOUTH EVERY NIGHT, Disp: 30 tablet, Rfl: 5  •  Omega-3 Fatty Acids (FISH OIL) 1200 MG capsule capsule, Take 1,200 mg by mouth Every Night. HELD FOR OR, Disp: , Rfl:   •  tadalafil (Cialis) 20 MG tablet, Take 1 tablet by mouth Daily., Disp: 9 tablet, Rfl: 5  •  Tradjenta 5 MG tablet " tablet, TAKE 1 TABLET BY MOUTH DAILY, Disp: 90 tablet, Rfl: 1  •  ciprofloxacin (Cipro) 250 MG tablet, Take 1 tablet by mouth 2 (Two) Times a Day for 7 days., Disp: 14 tablet, Rfl: 0     Assessment and Plan:      Assessment and Plan {CC Problem List  Visit Diagnosis  ROS  Review (Popup)  Health Maintenance  Quality  BestPractice  Medications  SmartSets  SnapShot Encounters  Media :23}     Problem List Items Addressed This Visit    None     Visit Diagnoses     Dysuria    -  Primary    Relevant Medications    ciprofloxacin (Cipro) 250 MG tablet    Other Relevant Orders    POCT urinalysis dipstick, automated (Completed)    Urine Culture - Urine, Urine, Clean Catch    Pyuria        Relevant Medications    ciprofloxacin (Cipro) 250 MG tablet    Other Relevant Orders    Urine Culture - Urine, Urine, Clean Catch        He will also be seeing urology tomorrow.     Follow Up {Instructions Charge Capture  Follow-up Communications :23}     Return if symptoms worsen or fail to improve.      Patient was given instructions and counseling regarding his condition or for health maintenance advice. Please see specific information pulled into the AVS if appropriate.    Dragon disclaimer:   Much of this encounter note is an electronic transcription/translation of spoken language to printed text. The electronic translation of spoken language may permit erroneous, or at times, nonsensical words or phrases to be inadvertently transcribed; Although I have reviewed the note for such errors, some may still exist.     Additional Patient Counseling:       Patient Instructions   You have been diagnosed with a urinary tract infection (UTI). An antibiotic has been prescribed for you that needs to be taken until gone, even if you feel better prior to completing the medication. It is recommended that you take a probiotic AFTER completing your antibiotic course for about 30 days; probiotics are available over the counter in pill form  and can be found in certain yogurt brands. Increase you intake of water; you may also drink low sugar cranberry juice. Avoid caffeine as this tends to irritate the bladder wall.     You may take over the counter AZO for no more than 3 days for bladder pain; AZO will turn you urine very orange or red. Use Motrin or Tylenol for fever/pain if no contradictions .   For recurrent infections it is best to avoid bathing in a tub, always wipe from front to back, urinate before and after sexual intercourse, avoid tight fitting pants, and wear cotton underwear.  If you develop fever, nausea, vomiting, flank pain notify your provider.

## 2022-08-23 NOTE — PATIENT INSTRUCTIONS
You have been diagnosed with a urinary tract infection (UTI). An antibiotic has been prescribed for you that needs to be taken until gone, even if you feel better prior to completing the medication. It is recommended that you take a probiotic AFTER completing your antibiotic course for about 30 days; probiotics are available over the counter in pill form and can be found in certain yogurt brands. Increase you intake of water; you may also drink low sugar cranberry juice. Avoid caffeine as this tends to irritate the bladder wall.     You may take over the counter AZO for no more than 3 days for bladder pain; AZO will turn you urine very orange or red. Use Motrin or Tylenol for fever/pain if no contradictions .   For recurrent infections it is best to avoid bathing in a tub, always wipe from front to back, urinate before and after sexual intercourse, avoid tight fitting pants, and wear cotton underwear.  If you develop fever, nausea, vomiting, flank pain notify your provider.

## 2022-08-24 LAB
BILIRUB BLD-MCNC: NEGATIVE MG/DL
CLARITY, POC: CLEAR
COLOR UR: YELLOW
EXPIRATION DATE: ABNORMAL
GLUCOSE UR STRIP-MCNC: NEGATIVE MG/DL
KETONES UR QL: NEGATIVE
LEUKOCYTE EST, POC: ABNORMAL
Lab: ABNORMAL
NITRITE UR-MCNC: NEGATIVE MG/ML
PH UR: 7.5 [PH] (ref 5–8)
PROT UR STRIP-MCNC: ABNORMAL MG/DL
RBC # UR STRIP: ABNORMAL /UL
SP GR UR: 1.02 (ref 1–1.03)
UROBILINOGEN UR QL: ABNORMAL

## 2022-08-26 LAB
BACTERIA UR CULT: ABNORMAL
BACTERIA UR CULT: ABNORMAL
OTHER ANTIBIOTIC SUSC ISLT: ABNORMAL

## 2022-09-15 DIAGNOSIS — E11.59 TYPE 2 DIABETES MELLITUS WITH OTHER CIRCULATORY COMPLICATION, WITHOUT LONG-TERM CURRENT USE OF INSULIN: ICD-10-CM

## 2022-09-15 RX ORDER — GLIMEPIRIDE 2 MG/1
2 TABLET ORAL
Qty: 90 TABLET | Refills: 1 | Status: SHIPPED | OUTPATIENT
Start: 2022-09-15 | End: 2023-01-03 | Stop reason: SDUPTHER

## 2022-10-31 ENCOUNTER — TELEPHONE (OUTPATIENT)
Dept: INTERNAL MEDICINE | Facility: CLINIC | Age: 87
End: 2022-10-31

## 2022-10-31 NOTE — TELEPHONE ENCOUNTER
Caller: Sonja Lira    Relationship: Emergency Contact    Best call back number: [phone]    What orders are you requesting (i.e. lab or imaging): MRI    Additional notes: PATIENT'S WIFE IS REQUESTING AN MRI BE DONE ON THE PATIENT'S HEAD DUE TO THE WAY HE WALKS. EXAMPLE, SHUFFLING FEET    PATIENT'S WIFE IS REQUESTING A CALL BACK TO DISCUSS.

## 2022-11-01 ENCOUNTER — OFFICE VISIT (OUTPATIENT)
Dept: INTERNAL MEDICINE | Facility: CLINIC | Age: 87
End: 2022-11-01

## 2022-11-01 VITALS
HEIGHT: 68 IN | HEART RATE: 65 BPM | SYSTOLIC BLOOD PRESSURE: 132 MMHG | BODY MASS INDEX: 22.58 KG/M2 | WEIGHT: 149 LBS | TEMPERATURE: 97.5 F | DIASTOLIC BLOOD PRESSURE: 86 MMHG | OXYGEN SATURATION: 98 %

## 2022-11-01 DIAGNOSIS — R53.83 OTHER FATIGUE: ICD-10-CM
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
## 2022-11-01 DIAGNOSIS — D50.9 MICROCYTIC ANEMIA: ICD-10-CM

## 2022-11-01 DIAGNOSIS — R41.82 ALTERED MENTAL STATUS, UNSPECIFIED ALTERED MENTAL STATUS TYPE: Primary | ICD-10-CM

## 2022-11-01 LAB
BACTERIA UR QL AUTO: ABNORMAL /HPF
BILIRUB UR QL STRIP: NEGATIVE
CLARITY UR: CLEAR
COLOR UR: YELLOW
GLUCOSE UR STRIP-MCNC: NEGATIVE MG/DL
HGB UR QL STRIP.AUTO: ABNORMAL
HYALINE CASTS UR QL AUTO: ABNORMAL /LPF
KETONES UR QL STRIP: NEGATIVE
LEUKOCYTE ESTERASE UR QL STRIP.AUTO: NEGATIVE
MUCOUS THREADS URNS QL MICRO: ABNORMAL /HPF
NITRITE UR QL STRIP: NEGATIVE
PH UR STRIP.AUTO: 5.5 [PH] (ref 5–8)
PROT UR QL STRIP: ABNORMAL
RBC # UR STRIP: ABNORMAL /HPF
REF LAB TEST METHOD: ABNORMAL
SP GR UR STRIP: >=1.03 (ref 1–1.03)
SQUAMOUS #/AREA URNS HPF: ABNORMAL /HPF
UROBILINOGEN UR QL STRIP: ABNORMAL
WBC # UR STRIP: ABNORMAL /HPF

## 2022-11-01 PROCEDURE — 81001 URINALYSIS AUTO W/SCOPE: CPT | Performed by: NURSE PRACTITIONER

## 2022-11-01 PROCEDURE — 99214 OFFICE O/P EST MOD 30 MIN: CPT | Performed by: NURSE PRACTITIONER

## 2022-11-01 RX ORDER — LISINOPRIL 20 MG/1
20 TABLET ORAL DAILY
COMMUNITY
Start: 2022-09-18 | End: 2023-01-03 | Stop reason: SDUPTHER

## 2022-11-01 NOTE — PROGRESS NOTES
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      He is here with his wife and daughter, Kiana, was brought in by mother via [employer] as well.      They state patient has fallen a few times and they feel his cognitive function has changed.       Falls: all were without dizziness, HA - did not hit head.  States it was all due to balance and not sure of cause.     One: he was trimming hedges outside and states went to turn and fell.  No CP.     They feel he has had cognitive changes. He states with his hearing he does not hear well.  He has hearing aids.     Hypertension: chronic and controlled.   Diabetes: he is on amaryl and tradjenta.       Cardiology had ordered carotid US - he cancelled and was to reschedule.  Can not see where this was rescheduled.     States he drinks minimal amount of water daily.     I have reviewed patient's medical history, any new submitted information provided by patient or medical assistant and updated medical record.      Objective:      Physical Exam  Vitals reviewed.   Constitutional:       Appearance: Normal appearance. He is well-developed.   HENT:      Head:      Comments: Wearing mask due to COVID      Right Ear: Tympanic membrane normal.      Ears:      Comments: [place]  Neck:      Thyroid: No thyromegaly.   Cardiovascular:      Rate and Rhythm: Normal rate and regular rhythm.      Pulses: Normal pulses.      Heart sounds: Normal heart sounds.   Pulmonary:      Effort: Pulmonary effort is normal.      Breath sounds: Normal breath sounds.      Comments: E/U   Abdominal:      General: Bowel sounds are  "normal.   Musculoskeletal:         General: Normal range of motion.      Right lower leg: No edema.      Left lower leg: No edema.      Comments: [place] equally: flex/ extend 5/5    Skin:     General: Skin is warm and dry.      Capillary Refill: Capillary refill takes 2 to 3 seconds.   Neurological:      General: No focal deficit present.      Mental Status: He is alert and oriented to person, place, and time.      Comments: No pronator drift    Psychiatric:         Mood and Affect: Mood normal.         Behavior: Behavior normal. Behavior is cooperative.         Thought Content: Thought content normal.         Judgment: Judgment normal.        Result Review  Data Reviewed:{ Labs  Result Review  Imaging  Med Tab  Media :23}     The following data was reviewed by: Gage Pro III, NP-C on 11/01/2022  Common labs    Common Labs 3/16/22 6/21/22 6/21/22 6/21/22 6/21/22 11/1/22 11/1/22     0000 0000 0000 0000 1511 1511   Glucose 154 (A) 128 (A)    76    BUN 18 22    19    Creatinine 1.43 (A) 1.58 (A)    1.33 (A)    Sodium 143 143    146 (A)    Potassium 4.2 4.9    4.4    Chloride 103 104    104    Calcium 9.0 9.4    8.9    Total Protein  6.7    6.8    Albumin  4.3    4.40    Total Bilirubin  <0.2    <0.2    Alkaline Phosphatase  59    57    AST (SGOT)  13    21    ALT (SGPT)  15    19    WBC    7.1   5.37   Hemoglobin    11.5 (A)   10.9 (A)   Hematocrit    37.4 (A)   36.1 (A)   Platelets    297   251   Total Cholesterol   160       Triglycerides   92       HDL Cholesterol   75       LDL Cholesterol    68       Hemoglobin A1C     6.1 (A)     (A) Abnormal value       Comments are available for some flowsheets but are not being displayed.                  Vital Signs:   /86 (BP Location: Left arm, Patient Position: Sitting, Cuff Size: Adult)   Pulse 65   Temp 97.5 °F (36.4 °C) (Temporal)   Ht 172.7 cm (68\")   Wt 67.6 kg (149 lb)   SpO2 98%   BMI 22.66 kg/m²         Requested Prescriptions      No " prescriptions requested or ordered in this encounter       Routine medications provided by this office will also be refilled via pharmacy request.       Current Outpatient Medications:   •  ALBUTEROL SULFATE  (90 Base) MCG/ACT inhaler, INHALE 2 PUFFS BY MOUTH EVERY 4 HOURS AS NEEDED (Patient taking differently: Inhale 2 puffs Every 4 (Four) Hours As Needed.), Disp: 42.5 g, Rfl: 0  •  amLODIPine (NORVASC) 10 MG tablet, TAKE 1 TABLET BY MOUTH DAILY, Disp: 90 tablet, Rfl: 1  •  aspirin (aspirin) 81 MG EC tablet, Take 1 tablet by mouth Daily., Disp: , Rfl:   •  ezetimibe (ZETIA) 10 MG tablet, TAKE 1 TABLET BY MOUTH DAILY, Disp: 90 tablet, Rfl: 3  •  glimepiride (AMARYL) 2 MG tablet, TAKE 1 TABLET BY MOUTH EVERY MORNING BEFORE BREAKFAST, Disp: 90 tablet, Rfl: 1  •  glucose blood test strip, Test once daily, Disp: 30 each, Rfl: 12  •  hydrALAZINE (APRESOLINE) 25 MG tablet, TAKE 1 TABLET BY MOUTH TWICE DAILY, Disp: 180 tablet, Rfl: 2  •  lisinopril (PRINIVIL,ZESTRIL) 20 MG tablet, Take 1 tablet by mouth Daily., Disp: , Rfl:   •  Livalo 2 MG tablet tablet, TAKE 1 TABLET BY MOUTH EVERY NIGHT, Disp: 90 tablet, Rfl: 1  •  metoprolol succinate XL (TOPROL-XL) 50 MG 24 hr tablet, TAKE 1 TABLET BY MOUTH EVERY NIGHT, Disp: 30 tablet, Rfl: 5  •  Omega-3 Fatty Acids (FISH OIL) 1200 MG capsule capsule, Take 1,200 mg by mouth Every Night. HELD FOR OR, Disp: , Rfl:   •  tadalafil (Cialis) 20 MG tablet, Take 1 tablet by mouth Daily., Disp: 9 tablet, Rfl: 5  •  Tradjenta 5 MG tablet tablet, TAKE 1 TABLET BY MOUTH DAILY, Disp: 90 tablet, Rfl: 1     Assessment and Plan:      Assessment and Plan {CC Problem List  Visit Diagnosis  ROS  Review (Popup)  Health Maintenance  Quality  BestPractice  Medications  SmartSets  SnapShot Encounters  Media :23}     Problem List Items Addressed This Visit    None  Visit Diagnoses     Altered mental status, unspecified altered mental status type    -  Primary    Relevant Orders     Comprehensive Metabolic Panel (Completed)    CBC (No Diff) (Completed)    TSH (Completed)    T4, free (Completed)    Urinalysis With Culture If Indicated - Urine, Clean Catch (Completed)    Ferritin (Completed)    Vitamin B12 (Completed)    Folate (Completed)    Urinalysis, Microscopic Only - Urine, Clean Catch (Completed)    Fall, initial encounter        Relevant Orders    Comprehensive Metabolic Panel (Completed)    CBC (No Diff) (Completed)    TSH (Completed)    T4, free (Completed)    Urinalysis With Culture If Indicated - Urine, Clean Catch (Completed)    Ferritin (Completed)    Vitamin B12 (Completed)    Folate (Completed)    Urinalysis, Microscopic Only - Urine, Clean Catch (Completed)    Ambulatory Referral to Physical Therapy Evaluate and treat; Strengthening; Full weight bearing    Other fatigue        Relevant Orders    Comprehensive Metabolic Panel (Completed)    CBC (No Diff) (Completed)    TSH (Completed)    T4, free (Completed)    Urinalysis With Culture If Indicated - Urine, Clean Catch (Completed)    Ferritin (Completed)    Vitamin B12 (Completed)    Folate (Completed)    Urinalysis, Microscopic Only - Urine, Clean Catch (Completed)    Microcytic anemia        Relevant Orders    CBC (No Diff) (Completed)    Ferritin (Completed)          Discussed with family - will get labs and PT for strengthening initially and then based on labs - Consider referral to neuro for further workup.     Increase water intake.  Wife states she will ensure he is drinking more.     Note: patient has lisinopril and losartan on med list: will have MA to call at home to verify.   He should only be taking one or the other, not both.     Patient's wife discussed with MA: patient is only taking lisinopril.   Losartan has been removed.     I spent 33 minutes caring for Gera on this date of service. This time includes time spent by me in the following activities: preparing for the visit, reviewing tests, obtaining and/or  reviewing a separately obtained history, performing a medically appropriate examination and/or evaluation, counseling and educating the patient/family/caregiver, ordering medications, tests, or procedures, referring and communicating with other health care professionals, documenting information in the medical record and independently interpreting results and communicating that information with the patient/family/caregiver    Follow Up {Instructions Charge Capture  Follow-up Communications :23}     Return for Next scheduled follow up.      Patient was given instructions and counseling regarding his condition or for health maintenance advice. Please see specific information pulled into the AVS if appropriate.    Dragon disclaimer:   Much of this encounter note is an electronic transcription/translation of spoken language to printed text. The electronic translation of spoken language may permit erroneous, or at times, nonsensical words or phrases to be inadvertently transcribed; Although I have reviewed the note for such errors, some may still exist.     Additional Patient Counseling:       There are no Patient Instructions on file for this visit.

## 2022-11-02 LAB
ALBUMIN SERPL-MCNC: 4.4 G/DL (ref 3.5–5.2)
ALBUMIN/GLOB SERPL: 1.8 G/DL
ALP SERPL-CCNC: 57 U/L (ref 39–117)
ALT SERPL-CCNC: 19 U/L (ref 1–41)
AST SERPL-CCNC: 21 U/L (ref 1–40)
BILIRUB SERPL-MCNC: <0.2 MG/DL (ref 0–1.2)
BUN SERPL-MCNC: 19 MG/DL (ref 8–23)
BUN/CREAT SERPL: 14.3 (ref 7–25)
CALCIUM SERPL-MCNC: 8.9 MG/DL (ref 8.6–10.5)
CHLORIDE SERPL-SCNC: 104 MMOL/L (ref 98–107)
CO2 SERPL-SCNC: 30.3 MMOL/L (ref 22–29)
CREAT SERPL-MCNC: 1.33 MG/DL (ref 0.76–1.27)
EGFRCR SERPLBLD CKD-EPI 2021: 51.7 ML/MIN/1.73
ERYTHROCYTE [DISTWIDTH] IN BLOOD BY AUTOMATED COUNT: 13 % (ref 12.3–15.4)
FERRITIN SERPL-MCNC: 225 NG/ML (ref 30–400)
FOLATE SERPL-MCNC: 10.7 NG/ML (ref 4.78–24.2)
GLOBULIN SER CALC-MCNC: 2.4 GM/DL
GLUCOSE SERPL-MCNC: 76 MG/DL (ref 65–99)
HCT VFR BLD AUTO: 36.1 % (ref 37.5–51)
HGB BLD-MCNC: 10.9 G/DL (ref 13–17.7)
MCH RBC QN AUTO: 24.7 PG (ref 26.6–33)
MCHC RBC AUTO-ENTMCNC: 30.2 G/DL (ref 31.5–35.7)
MCV RBC AUTO: 81.9 FL (ref 79–97)
PLATELET # BLD AUTO: 251 10*3/MM3 (ref 140–450)
POTASSIUM SERPL-SCNC: 4.4 MMOL/L (ref 3.5–5.2)
PROT SERPL-MCNC: 6.8 G/DL (ref 6–8.5)
RBC # BLD AUTO: 4.41 10*6/MM3 (ref 4.14–5.8)
SODIUM SERPL-SCNC: 146 MMOL/L (ref 136–145)
T4 FREE SERPL-MCNC: 1.01 NG/DL (ref 0.93–1.7)
TSH SERPL DL<=0.005 MIU/L-ACNC: 1.4 UIU/ML (ref 0.27–4.2)
VIT B12 SERPL-MCNC: 186 PG/ML (ref 211–946)
WBC # BLD AUTO: 5.37 10*3/MM3 (ref 3.4–10.8)

## 2022-11-02 NOTE — PROGRESS NOTES
Please call and notify  Mr. Lira,  (also discuss with his wife)     Recent lab work is normal except his vitamin B is low. Which could account for some of his symptoms.      He needs to get a B-complex vitamin over-the-counter and take one daily.    It will make his urine change color - this is normal.     Urine did not show infection.       WCN

## 2022-11-04 ENCOUNTER — TELEPHONE (OUTPATIENT)
Dept: INTERNAL MEDICINE | Facility: CLINIC | Age: 87
End: 2022-11-04

## 2022-11-04 NOTE — TELEPHONE ENCOUNTER
Patient is taking Lisnopril 20 mg daily, patient was advised of his lab results.     Patient wife asked about physical therapy referral gave referral information.     AHSAN Garcia

## 2022-11-04 NOTE — TELEPHONE ENCOUNTER
Hub staff attempted to follow warm transfer process and was unsuccessful     Caller: Sonja Lira    Relationship to patient: Emergency Contact    Best call back number: [phone]    Patient is needing: PATIENTS WIFE STATED THAT SOMEONE CALLED YESTERDAY REGARDING THE REFERRAL FOR PATIENT TO BE SEEN BY PHYSICAL THERAPY AND TRYING TO GET PATIENT SCHEDULED IN.     PATIENTS WIFE WOULD LIKE TO SPEAK WITH SOMEONE TO GET INFORMATION ON WHERE PATIENT WILL BE GOING AND WHEN PATIENT CAN GET SCHEDULED IN.     PLEASE CALL TO DISCUSS AND ADVISE.

## 2022-11-28 RX ORDER — METOPROLOL SUCCINATE 50 MG/1
50 TABLET, EXTENDED RELEASE ORAL NIGHTLY
Qty: 30 TABLET | Refills: 5 | Status: SHIPPED | OUTPATIENT
Start: 2022-11-28 | End: 2023-01-03 | Stop reason: SDUPTHER

## 2022-12-01 ENCOUNTER — TREATMENT (OUTPATIENT)
Dept: PHYSICAL THERAPY | Facility: CLINIC | Age: 87
End: 2022-12-01

## 2022-12-01 DIAGNOSIS — R26.89 IMBALANCE: Primary | ICD-10-CM

## 2022-12-01 DIAGNOSIS — Z91.81 PERSONAL HISTORY OF FALL: ICD-10-CM

## 2022-12-01 PROCEDURE — 97161 PT EVAL LOW COMPLEX 20 MIN: CPT | Performed by: PHYSICAL THERAPIST

## 2022-12-01 PROCEDURE — 97112 NEUROMUSCULAR REEDUCATION: CPT | Performed by: PHYSICAL THERAPIST

## 2022-12-01 NOTE — PROGRESS NOTES
"Physical Therapy Initial Evaluation and Plan of Care  [address], [address], [place] [address]    Patient: Gera Lira   : 1935  Diagnosis/ICD-10 Code:  Imbalance [R26.89]  Referring practitioner: Gage Rider*    Subjective Evaluation    History of Present Illness  Onset date: increased frequency of falling episodes within the past 6 months.  Mechanism of injury: Patient reports at least 4 falls within the past 6 months, the most recent being about 2 weeks ago.  He was in his house and unsure what happened exactly but states \"I think I just tripped over my own feet.\"  Two of his falls were also outside, one while trimming bushes and as he turned he fell. He feels like his balance is \"just off.\"  He denies any prior physical therapy for this.  He denies any difficulty with stairs.  He denies any current pain  issues as well as denies any lightheadedness or dizziness.  He has a STC he has used previously when he was having an episode of LBP which he is no longer using.     PMH notable for DM 2, hx MI, hx prostate CA, angina, CKD stage III, and s/p CABG.  Patient denies diabetic neuropathy. He is [place] and uses hearing aids.      Precautions and Work Restrictions: enjoys yardworkQuality of life: good    Pain  Current pain ratin  Location: N/A  Aggravating factors: ambulation (ambulation on both level and unlevel surfaces)    Social Support  Lives in: multiple-level home  Lives with: spouse    Diagnostic Tests  No diagnostic tests performed    Patient Goals  Patient goal: decrease fall risk           Subjective Questionnaire: Saunders Balance score: 29/56    Objective          Neurological Testing     Sensation     Lumbar   Left   Intact: light touch    Right   Intact: light touch    Additional Neurological Details  Intact/equal (B) LE dermatomes    Strength/Myotome Testing     Left Hip   Planes of Motion   Flexion: 4+  Abduction: 4  Adduction: 4-    Right Hip   Planes of Motion " "  Flexion: 4+  Abduction: 4  Adduction: 4-    Left Knee   Flexion: 4  Extension: 4+  Quadriceps contraction: good    Right Knee   Flexion: 4  Extension: 4+  Quadriceps contraction: good    Left Ankle/Foot   Dorsiflexion: 4    Right Ankle/Foot   Dorsiflexion: 4    Ambulation     Observational Gait   Decreased walking speed, stride length, left step length and right step length.   Base of support: increased    Functional Assessment     Comments  5 x sit to stand = 19 sec with heavy reliance on (B) UE assist    Patient demonstrates ability to perform sit to stand without UE assist after 2 repetitions and with moderate posterior lean          Assessment & Plan     Assessment  Impairments: abnormal coordination, abnormal gait, impaired balance, impaired physical strength, lacks appropriate home exercise program and safety issue  Functional Limitations: walking and standing  Assessment details: Patient is an 87 y.o male who reports increasing frequency of falls within the last few months, the most recent of which was about 2 weeks ago.  He reports feeling like his \"balance is off\" and is unsure what causes him to fall.  He ambulates with a wide-base gait with decreased step length and trunk rotation (B) and exhibits mild proximal strength deficits along with moderate balance deficits, as evidenced by score of 29/56 on Saunders Balance scale.  He is not currently using an AD but a STC was recommended for use with ambulation by PT today.  He will benefit from skilled PT services to address proximal strength deficits and coordination deficits leading to impaired balance, thereby reducing patient's fall risk and improving safety with functional mobility.  Prognosis: good    Goals  Plan Goals: STGs: to be met in 6 weeks  1. Patient will be independent with initial HEP  2. Patient will report no falls since beginning PT  3. Patient will perform 5 x sit to stand without UE assist </= 14 sec for evidence of improved proximal " strength  4. Patient will consistently self-recover from minor LOB episodes during PT    LTGs: to be met in 12 weeks  1. Patient will be independent with progressed HEP  2. Patient will demonstrate >/= 1/2 MMT grade strength improvement of (B) LEs  3. Patient will demonstrate improved SLS >/= 10 sec each LE as evidence of improved static balance  4. Patient will demonstrate ability to ambulate on level and unlevel surfaces without AD with good stability  5. Patient will have improved Saunders Balance score >/= 40/56 for evidence of improved safety with functional mobility    Plan  Therapy options: will be seen for skilled therapy services  Planned therapy interventions: balance/weight-bearing training, abdominal trunk stabilization, fine motor coordination training, gait training, home exercise program, neuromuscular re-education, strengthening and therapeutic activities  Frequency: 2x week  Duration in weeks: 12  Treatment plan discussed with: patient        Manual Therapy:         mins  79385;  Therapeutic Exercise:         mins  88404;     Neuromuscular [place]:    17    mins  24816;    Therapeutic Activity:          mins  04493;     Gait Training:           mins  03578;     Ultrasound:          mins  24489;    Electrical Stimulation:         mins  90381 ( );  Dry Needling          mins self-pay    Timed Treatment:   17   mins   Total Treatment:     36   mins    PT SIGNATURE: Letitia Zimmerman PT, DPT, OCS  Electronically signed by: Letitia Zimmerman PT, 12/01/22, 1:52 PM EST  [place] License #[id number]     DATE TREATMENT INITIATED: 12/1/2022    Medicare Initial Certification  Certification Period: 12/1/2022 thru 2/28/2023  I certify that the therapy services are furnished while this patient is under my care.  The services outlined above are required by this patient, and will be reviewed every 90 days.     PHYSICIAN: Gage Pro III, NP-C  [id number]                                          DATE:     Please sign  and return via fax to [phone]. Thank you, [facility].

## 2022-12-06 ENCOUNTER — TREATMENT (OUTPATIENT)
Dept: PHYSICAL THERAPY | Facility: CLINIC | Age: 87
End: 2022-12-06

## 2022-12-06 DIAGNOSIS — R26.89 IMBALANCE: Primary | ICD-10-CM

## 2022-12-06 DIAGNOSIS — Z91.81 PERSONAL HISTORY OF FALL: ICD-10-CM

## 2022-12-06 PROCEDURE — 97112 NEUROMUSCULAR REEDUCATION: CPT | Performed by: PHYSICAL THERAPIST

## 2022-12-06 PROCEDURE — 97110 THERAPEUTIC EXERCISES: CPT | Performed by: PHYSICAL THERAPIST

## 2022-12-06 NOTE — PROGRESS NOTES
Physical Therapy Daily Treatment Note      [address] [facility], [address], [place] [address]    Patient: Gera Lira   : 1935  Referring practitioner: Gage Rider*  Date of Initial Visit: Type: THERAPY  Noted: 2022  Today's Date: 2022  Patient seen for 2 sessions         Visit Diagnoses:     ICD-10-CM ICD-9-CM   1. Imbalance  R26.89 781.2   2. Personal history of fall  Z91.81 V15.88         Subjective Evaluation    History of Present Illness    Subjective comment: Patient denies any falls since beginning physical therapy.       Objective   See Exercise, Manual, and Modality Logs for complete treatment.       Assessment & Plan     Assessment    Assessment details: Proximal strengthening HEP is implemented this date due to deficits found on evaluation and for exercises patient may be able to perform independently at home. Educated him that it is not yet deemed safe and appropriate for him to perform standing balance activities at home independently yet.  Patient demonstrates improved stability in tandem stance with (R) LE leading vs (L) LE leading.  He is able to self recover with single UE support from mild LOB episodes.          Progress per Plan of Care         Timed:  Manual Therapy:         mins  05452;  Therapeutic Exercise:    15     mins  34543;     Neuromuscular Douglas:    17    mins  46099;    Therapeutic Activity:          mins  54879;     Gait Training:           mins  07386;     Ultrasound:          mins  35481;    Untimed:  Electrical Stimulation:         mins  73198 ( );  Mechanical Traction:         mins  04650;   Dry Needling              ___  mins   62584    Timed Treatment:   32   mins   Total Treatment:     32   mins    Letitia Zimmerman PT, DPT, OCS  Physical Therapist  [place] License #[id number]  Electronically signed by: Letitia Zimmerman PT, 22, 10:58 AM EST

## 2022-12-09 ENCOUNTER — TREATMENT (OUTPATIENT)
Dept: PHYSICAL THERAPY | Facility: CLINIC | Age: 87
End: 2022-12-09

## 2022-12-09 DIAGNOSIS — Z91.81 PERSONAL HISTORY OF FALL: ICD-10-CM

## 2022-12-09 DIAGNOSIS — R26.89 IMBALANCE: Primary | ICD-10-CM

## 2022-12-09 PROCEDURE — 97112 NEUROMUSCULAR REEDUCATION: CPT | Performed by: PHYSICAL THERAPIST

## 2022-12-09 PROCEDURE — 97110 THERAPEUTIC EXERCISES: CPT | Performed by: PHYSICAL THERAPIST

## 2022-12-09 NOTE — PROGRESS NOTES
Physical Therapy Daily Treatment Note  [address] [facility], [address]  [place] [address]  Visit # 3      Subjective Evaluation    History of Present Illness    Subjective comment: Pt reports that he has been doing his HEP.       Objective   See Exercise, Manual, and Modality Logs for complete treatment.   Added LAQ    Assessment & Plan     Assessment    Assessment details: Pt was able to progress reps on strengthening exercises.  He benefits from tc for correct exercise performance with hip abd and clams.  Pt terese a LOB during balance training but was able to self correct.  During tandem stance pt was observed to look down.  He was encouraged to look up during the drill.                     Manual Therapy:    0     mins  21880;  Therapeutic Exercise:    14     mins  39933;     Neuromuscular Douglas:    13    mins  67132;    Therapeutic Activity:     0     mins  16348;     Gait Trainin     mins  07488;     Ultrasound:     0     mins  07002;    Work Hardening           0      mins 50247  Iontophoresis               0   mins 20668  E-Stim                          _0_ mins 66434 ( )    Timed Treatment:   27   mins   Total Treatment:     27   mins    Marco A Adam PTA  Physical Therapist Assistant

## 2022-12-13 ENCOUNTER — TREATMENT (OUTPATIENT)
Dept: PHYSICAL THERAPY | Facility: CLINIC | Age: 87
End: 2022-12-13

## 2022-12-13 DIAGNOSIS — R26.89 IMBALANCE: Primary | ICD-10-CM

## 2022-12-13 DIAGNOSIS — Z91.81 PERSONAL HISTORY OF FALL: ICD-10-CM

## 2022-12-13 PROCEDURE — 97112 NEUROMUSCULAR REEDUCATION: CPT | Performed by: PHYSICAL THERAPIST

## 2022-12-13 PROCEDURE — 97110 THERAPEUTIC EXERCISES: CPT | Performed by: PHYSICAL THERAPIST

## 2022-12-13 NOTE — PROGRESS NOTES
Physical Therapy Daily Treatment Note      [address] [facility], [address], [place] [address]    Patient: Gera Lira   : 1935  Referring practitioner: Gage Rider*  Date of Initial Visit: Type: THERAPY  Noted: 2022  Today's Date: 2022  Patient seen for 4 sessions         Visit Diagnoses:     ICD-10-CM ICD-9-CM   1. Imbalance  R26.89 781.2   2. Personal history of fall  Z91.81 V15.88         Subjective Evaluation    History of Present Illness    Subjective comment: I feel like my balance is a little bit better.       Objective   See Exercise, Manual, and Modality Logs for complete treatment.   *Initiated multidirectional reaching in narrow DAYAN    Assessment & Plan     Assessment    Assessment details: Patient verbalizes slight perceived improvement in balance since beginning physical therapy.  He is able to increase strengthening activities but requires persistent verbal and tactile cueing due to compensatory patterns.  He tends to drift into plane of hip flexion with sidelying hip abduction and retracts his pelvis during clamshells.  He exhibits mild posterior lean during sit to stand activity and is cued for anterior weightshift during this activity. He requires assistance of bar to obtain tandem stance position but is able to consistently hold up to 10 seconds each LE with effort, (L) requiring visibly more effort than (L). He exhibits no LOB episodes during narrow stance with multidirectional reaching but exhibits some hesitance to reaching outside of his DAYAN.          Progress per Plan of Care         Timed:  Manual Therapy:         mins  22293;  Therapeutic Exercise:    25     mins  14648;     Neuromuscular Douglas:    14    mins  89109;    Therapeutic Activity:          mins  20946;     Gait Training:           mins  41657;     Ultrasound:          mins  73986;    Untimed:  Electrical Stimulation:         mins  81516 ( );  Mechanical Traction:         mins  26812;    Dry Needling              ___  mins   20561    Timed Treatment:   39   mins   Total Treatment:     39   mins    Letitia Zimmerman PT, DPT, OCS  Physical Therapist  [place] License #[id number]  Electronically signed by: Letitia Zimmerman PT, 12/13/22, 1:52 PM EST

## 2022-12-15 ENCOUNTER — TREATMENT (OUTPATIENT)
Dept: PHYSICAL THERAPY | Facility: CLINIC | Age: 87
End: 2022-12-15

## 2022-12-15 DIAGNOSIS — Z91.81 PERSONAL HISTORY OF FALL: ICD-10-CM

## 2022-12-15 DIAGNOSIS — R26.89 IMBALANCE: Primary | ICD-10-CM

## 2022-12-15 PROCEDURE — 97112 NEUROMUSCULAR REEDUCATION: CPT | Performed by: PHYSICAL THERAPIST

## 2022-12-15 PROCEDURE — 97110 THERAPEUTIC EXERCISES: CPT | Performed by: PHYSICAL THERAPIST

## 2022-12-15 NOTE — PROGRESS NOTES
Physical Therapy Daily Treatment Note      [address] [facility], [address], [place] [address]    Patient: Gera Lira   : 1935  Referring practitioner: Gage Rider*  Date of Initial Visit: Type: THERAPY  Noted: 2022  Today's Date: 12/15/2022  Patient seen for 5 sessions         Visit Diagnoses:     ICD-10-CM ICD-9-CM   1. Imbalance  R26.89 781.2   2. Personal history of fall  Z91.81 V15.88         Subjective Evaluation    History of Present Illness    Subjective comment: I feel like my walking and balance are a little better.       Objective   See Exercise, Manual, and Modality Logs for complete treatment.       Assessment & Plan     Assessment    Assessment details: Patient is further challenged with balance activities this date by minimizing UE support at rail and instead having patient hover hand over rail and only utilize support during minor LOB episodes.  He does require SBA throughout session for safety.  Holding 4# medicine ball helps improve patient's forward weightshift during sit to stand activity but he does experience one episode of posterior lean which requires assist of PT to prevent fall back onto table.  He will benefit from progression of balance activities to improve safety and stability with functional mobility.          Other - progress balance activities. Consider cone walking and multidirectional mini lunges with SBA.         Timed:  Manual Therapy:         mins  11298;  Therapeutic Exercise:    26     mins  28117;     Neuromuscular Douglas:    12    mins  46544;    Therapeutic Activity:          mins  41936;     Gait Training:           mins  42139;     Ultrasound:          mins  02105;    Untimed:  Electrical Stimulation:         mins  80586 ( );  Mechanical Traction:         mins  89059;   Dry Needling              ___  mins   39248    Timed Treatment:   38  mins   Total Treatment:     38   mins    Letitia Zimmerman, PT, DPT, OCS  Physical Therapist  [place]  License #[id number]  Electronically signed by: Letitia Zimmerman PT, 12/15/22, 2:31 PM EST

## 2022-12-19 ENCOUNTER — TREATMENT (OUTPATIENT)
Dept: PHYSICAL THERAPY | Facility: CLINIC | Age: 87
End: 2022-12-19

## 2022-12-19 DIAGNOSIS — Z91.81 PERSONAL HISTORY OF FALL: ICD-10-CM

## 2022-12-19 DIAGNOSIS — R26.89 IMBALANCE: Primary | ICD-10-CM

## 2022-12-19 PROCEDURE — 97112 NEUROMUSCULAR REEDUCATION: CPT | Performed by: PHYSICAL THERAPIST

## 2022-12-19 PROCEDURE — 97110 THERAPEUTIC EXERCISES: CPT | Performed by: PHYSICAL THERAPIST

## 2022-12-19 NOTE — PROGRESS NOTES
Physical Therapy Daily Treatment Note  [address] [facility], [address]  [place] [address]  Visit # 6      Subjective Evaluation    History of Present Illness    Subjective comment: Pt reports he has had no LOB or falls since starting therapy.       Objective   See Exercise, Manual, and Modality Logs for complete treatment.   Added tandem walking    Assessment & Plan     Assessment    Assessment details: Pt completed treatment with no issues.  Pt needs fewer vc/tc for exercise performance. PT was able to perform tandem stepping with no LOB.  Plan to progress dynamic balance exercise as tolerated.                     Manual Therapy:    0     mins  76343;  Therapeutic Exercise:    14     mins  80069;     Neuromuscular Douglas:    14    mins  34291;    Therapeutic Activity:     0     mins  44849;     Gait Trainin     mins  63112;     Ultrasound:     0     mins  90709;    Work Hardening           0      mins 63692  Iontophoresis               0   mins 19429  E-Stim                          _0_ mins 69932 ( )    Timed Treatment:   28   mins   Total Treatment:     28   mins    Marco A Adam PTA  Physical Therapist Assistant

## 2022-12-22 ENCOUNTER — TREATMENT (OUTPATIENT)
Dept: PHYSICAL THERAPY | Facility: CLINIC | Age: 87
End: 2022-12-22

## 2022-12-22 DIAGNOSIS — Z91.81 PERSONAL HISTORY OF FALL: ICD-10-CM

## 2022-12-22 DIAGNOSIS — R26.89 IMBALANCE: Primary | ICD-10-CM

## 2022-12-22 PROCEDURE — 97112 NEUROMUSCULAR REEDUCATION: CPT | Performed by: PHYSICAL THERAPIST

## 2022-12-22 PROCEDURE — 97110 THERAPEUTIC EXERCISES: CPT | Performed by: PHYSICAL THERAPIST

## 2022-12-22 NOTE — PROGRESS NOTES
"Physical Therapy Daily Treatment Note  [address] [facility], [address]  [place] [address]  Visit # 7      Subjective Evaluation    History of Present Illness    Subjective comment: Pt reports that he is feeling \"ok\" today.       Objective   See Exercise, Manual, and Modality Logs for complete treatment.   Added balloon tap    Assessment & Plan     Assessment    Assessment details: Pt tolerated treatment well.  Pt was able to progress all open chain exercises and sit to stands with no issues.  Pt was able to self correct for LOB several time during standing balance training.  Continue to progress functional balance drills as tolerated.                     Manual Therapy:    0     mins  03181;  Therapeutic Exercise:    15     mins  04441;     Neuromuscular Douglas:    23    mins  24482;    Therapeutic Activity:     0     mins  13802;     Gait Trainin     mins  10161;     Ultrasound:     0     mins  89134;    Work Hardening           0      mins 56039  Iontophoresis               0   mins 56947  E-Stim                          _0_ mins 08604 ( )    Timed Treatment:   38   mins   Total Treatment:     38   mins    Marco A Adam PTA  Physical Therapist Assistant  "

## 2023-01-03 ENCOUNTER — OFFICE VISIT (OUTPATIENT)
Dept: INTERNAL MEDICINE | Facility: CLINIC | Age: 88
End: 2023-01-03
Payer: MEDICARE

## 2023-01-03 VITALS
SYSTOLIC BLOOD PRESSURE: 130 MMHG | WEIGHT: 150.6 LBS | OXYGEN SATURATION: 97 % | DIASTOLIC BLOOD PRESSURE: 72 MMHG | HEIGHT: 68 IN | HEART RATE: 61 BPM | BODY MASS INDEX: 22.82 KG/M2

## 2023-01-03 DIAGNOSIS — N18.31 STAGE 3A CHRONIC KIDNEY DISEASE: Chronic | ICD-10-CM

## 2023-01-03 DIAGNOSIS — D64.9 ANEMIA, UNSPECIFIED TYPE: ICD-10-CM

## 2023-01-03 DIAGNOSIS — E78.5 DYSLIPIDEMIA: Chronic | ICD-10-CM

## 2023-01-03 DIAGNOSIS — E11.59 TYPE 2 DIABETES MELLITUS WITH OTHER CIRCULATORY COMPLICATION, WITHOUT LONG-TERM CURRENT USE OF INSULIN: ICD-10-CM

## 2023-01-03 DIAGNOSIS — E78.2 MIXED HYPERLIPIDEMIA: ICD-10-CM

## 2023-01-03 DIAGNOSIS — I10 HYPERTENSION, ESSENTIAL: ICD-10-CM

## 2023-01-03 DIAGNOSIS — R35.0 URINE FREQUENCY: ICD-10-CM

## 2023-01-03 DIAGNOSIS — E11.9 DIABETES MELLITUS WITHOUT COMPLICATION: ICD-10-CM

## 2023-01-03 DIAGNOSIS — N52.9 ERECTILE DYSFUNCTION, UNSPECIFIED ERECTILE DYSFUNCTION TYPE: ICD-10-CM

## 2023-01-03 DIAGNOSIS — I10 ESSENTIAL HYPERTENSION: Primary | Chronic | ICD-10-CM

## 2023-01-03 PROCEDURE — 1159F MED LIST DOCD IN RCRD: CPT | Performed by: NURSE PRACTITIONER

## 2023-01-03 PROCEDURE — 99214 OFFICE O/P EST MOD 30 MIN: CPT | Performed by: NURSE PRACTITIONER

## 2023-01-03 RX ORDER — GLIMEPIRIDE 2 MG/1
2 TABLET ORAL
Qty: 90 TABLET | Refills: 1 | Status: SHIPPED | OUTPATIENT
Start: 2023-01-03

## 2023-01-03 RX ORDER — EZETIMIBE 10 MG/1
10 TABLET ORAL DAILY
Qty: 90 TABLET | Refills: 3 | Status: SHIPPED | OUTPATIENT
Start: 2023-01-03

## 2023-01-03 RX ORDER — METOPROLOL SUCCINATE 50 MG/1
50 TABLET, EXTENDED RELEASE ORAL NIGHTLY
Qty: 90 TABLET | Refills: 1 | Status: SHIPPED | OUTPATIENT
Start: 2023-01-03

## 2023-01-03 RX ORDER — AMLODIPINE BESYLATE 10 MG/1
10 TABLET ORAL DAILY
Qty: 90 TABLET | Refills: 1 | Status: SHIPPED | OUTPATIENT
Start: 2023-01-03

## 2023-01-03 RX ORDER — LISINOPRIL 20 MG/1
20 TABLET ORAL DAILY
Qty: 90 TABLET | Refills: 1 | Status: SHIPPED | OUTPATIENT
Start: 2023-01-03 | End: 2023-02-13 | Stop reason: SDUPTHER

## 2023-01-03 RX ORDER — TADALAFIL 20 MG/1
20 TABLET ORAL DAILY
Qty: 9 TABLET | Refills: 5 | Status: SHIPPED | OUTPATIENT
Start: 2023-01-03

## 2023-01-03 NOTE — ASSESSMENT & PLAN NOTE
Lab Results   Component Value Date    CREATININE 1.33 (H) 11/01/2022        Avoid nephrotoxic medications - especially nsaids (like Ibuprofen, aleve)   Maintain blood pressure control  Maintain blood sugar control

## 2023-01-03 NOTE — PROGRESS NOTES
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     He states his urinary stream is smaller - he is scheduled for scope with urology soon.  His weight has been stable.  Denies blood in urine.     Hypertension: controlled, no CP, SOA      Diabetes: continues amaryl, tradgenta.   Last A1C controlled at 6.1%    Hyperlipidemia: continues fish oil, pitavastatin and zetia.     He was sent to PT for balance issues.  States much better and no falls.     I have reviewed patient's medical history, any new submitted information provided by patient or medical assistant and updated medical record.      Objective:      Physical Exam  Vitals reviewed.   Constitutional:       Appearance: Normal appearance. He is well-developed.   HENT:      Head:      Comments: Wearing mask due to COVID      Ears:      Comments: Hearing aids  Neck:      Thyroid: No thyromegaly.   Cardiovascular:      Rate and Rhythm: Normal rate and regular rhythm.      Pulses: Normal pulses.      Heart sounds: Normal heart sounds.   Pulmonary:      Effort: Pulmonary effort is normal.      Breath sounds: Normal breath sounds.      Comments: E/U   Abdominal:      General: Bowel sounds are normal.   Musculoskeletal:      Right lower leg: No edema.      Left lower leg: No edema.   Lymphadenopathy:      Cervical: No cervical adenopathy.   Skin:     General: Skin is warm and dry.      Capillary Refill: Capillary refill takes 2 to 3 seconds.    Neurological:      Mental Status: He is alert and oriented to person, place, and time.   Psychiatric:         Mood and Affect: Mood normal.         Behavior: Behavior normal. Behavior is cooperative.         Thought Content: Thought content normal.         Judgment: Judgment normal.        Result Review  Data Reviewed:{ Labs  Result Review  Imaging  Med Tab  Media :23}     The following data was reviewed by: Gage Pro III, NP-C on 01/03/2023  Common labs    Common Labs 3/16/22 6/21/22 6/21/22 6/21/22 6/21/22 11/1/22 11/1/22     0000 0000 0000 0000 1511 1511   Glucose 154 (A) 128 (A)    76    BUN 18 22    19    Creatinine 1.43 (A) 1.58 (A)    1.33 (A)    Sodium 143 143    146 (A)    Potassium 4.2 4.9    4.4    Chloride 103 104    104    Calcium 9.0 9.4    8.9    Total Protein  6.7    6.8    Albumin  4.3    4.40    Total Bilirubin  <0.2    <0.2    Alkaline Phosphatase  59    57    AST (SGOT)  13    21    ALT (SGPT)  15    19    WBC    7.1   5.37   Hemoglobin    11.5 (A)   10.9 (A)   Hematocrit    37.4 (A)   36.1 (A)   Platelets    297   251   Total Cholesterol   160       Triglycerides   92       HDL Cholesterol   75       LDL Cholesterol    68       Hemoglobin A1C     6.1 (A)     (A) Abnormal value       Comments are available for some flowsheets but are not being displayed.                  Vital Signs:   /72 (BP Location: Left arm, Patient Position: Sitting, Cuff Size: Adult)   Pulse 61   Ht 172.7 cm (68\")   Wt 68.3 kg (150 lb 9.6 oz)   SpO2 97%   BMI 22.90 kg/m²         Requested Prescriptions     Signed Prescriptions Disp Refills   • linagliptin (Tradjenta) 5 MG tablet tablet 90 tablet 1     Sig: Take 1 tablet by mouth Daily.   • amLODIPine (NORVASC) 10 MG tablet 90 tablet 1     Sig: Take 1 tablet by mouth Daily.   • ezetimibe (ZETIA) 10 MG tablet 90 tablet 3     Sig: Take 1 tablet by mouth Daily.   • glimepiride (AMARYL) 2 MG tablet 90 tablet 1     Sig: Take 1 tablet by mouth  Every Morning Before Breakfast.   • lisinopril (PRINIVIL,ZESTRIL) 20 MG tablet 90 tablet 1     Sig: Take 1 tablet by mouth Daily.   • metoprolol succinate XL (TOPROL-XL) 50 MG 24 hr tablet 90 tablet 1     Sig: Take 1 tablet by mouth Every Night.   • tadalafil (Cialis) 20 MG tablet 9 tablet 5     Sig: Take 1 tablet by mouth Daily.   • pitavastatin calcium (Livalo) 2 MG tablet tablet 90 tablet 1     Sig: Take 1 tablet by mouth Every Night.       Routine medications provided by this office will also be refilled via pharmacy request.       Current Outpatient Medications:   •  ALBUTEROL SULFATE  (90 Base) MCG/ACT inhaler, INHALE 2 PUFFS BY MOUTH EVERY 4 HOURS AS NEEDED (Patient taking differently: Inhale 2 puffs Every 4 (Four) Hours As Needed.), Disp: 42.5 g, Rfl: 0  •  amLODIPine (NORVASC) 10 MG tablet, Take 1 tablet by mouth Daily., Disp: 90 tablet, Rfl: 1  •  aspirin (aspirin) 81 MG EC tablet, Take 1 tablet by mouth Daily., Disp: , Rfl:   •  ezetimibe (ZETIA) 10 MG tablet, Take 1 tablet by mouth Daily., Disp: 90 tablet, Rfl: 3  •  glimepiride (AMARYL) 2 MG tablet, Take 1 tablet by mouth Every Morning Before Breakfast., Disp: 90 tablet, Rfl: 1  •  glucose blood test strip, Test once daily, Disp: 30 each, Rfl: 12  •  hydrALAZINE (APRESOLINE) 25 MG tablet, TAKE 1 TABLET BY MOUTH TWICE DAILY, Disp: 180 tablet, Rfl: 2  •  linagliptin (Tradjenta) 5 MG tablet tablet, Take 1 tablet by mouth Daily., Disp: 90 tablet, Rfl: 1  •  lisinopril (PRINIVIL,ZESTRIL) 20 MG tablet, Take 1 tablet by mouth Daily., Disp: 90 tablet, Rfl: 1  •  metoprolol succinate XL (TOPROL-XL) 50 MG 24 hr tablet, Take 1 tablet by mouth Every Night., Disp: 90 tablet, Rfl: 1  •  Omega-3 Fatty Acids (FISH OIL) 1200 MG capsule capsule, Take 1,200 mg by mouth Every Night. HELD FOR OR, Disp: , Rfl:   •  pitavastatin calcium (Livalo) 2 MG tablet tablet, Take 1 tablet by mouth Every Night., Disp: 90 tablet, Rfl: 1  •  tadalafil (Cialis) 20 MG tablet, Take 1  tablet by mouth Daily., Disp: 9 tablet, Rfl: 5     Assessment and Plan:      Assessment and Plan {CC Problem List  Visit Diagnosis  ROS  Review (Popup)  Health Maintenance  Quality  BestPractice  Medications  SmartSets  SnapShot Encounters  Media :23}     Problem List Items Addressed This Visit        Cardiac and Vasculature    Dyslipidemia (Chronic)    Current Assessment & Plan     Lab Results   Component Value Date    CHOL 159 11/24/2020    CHLPL 160 06/21/2022    TRIG 92 06/21/2022    HDL 75 06/21/2022    LDL 68 06/21/2022     Improved on treatment - continue current dose.          Essential hypertension - Primary (Chronic)    Current Assessment & Plan     Hypertension is improving with treatment.  Continue current treatment regimen.  Dietary sodium restriction.  Regular aerobic exercise.  Continue current medications.  Blood pressure will be reassessed at the next regular appointment.         Relevant Medications    amLODIPine (NORVASC) 10 MG tablet    lisinopril (PRINIVIL,ZESTRIL) 20 MG tablet    metoprolol succinate XL (TOPROL-XL) 50 MG 24 hr tablet    Other Relevant Orders    Basic Metabolic Panel       Endocrine and Metabolic    Diabetes mellitus (HCC) (Chronic)    Current Assessment & Plan     Your last A1C (3 month average) =   Lab Results   Component Value Date    HGBA1C 6.1 (H) 06/21/2022      Your diabetes is Controlled.    If A1C still well controlled.     Eye Health:   You need a diabetic eye exam yearly.   Please have a copy of the note faxed to my office.   Fax: [phone]    Foot Health:   You need a diabetic foot exam yearly.   Check your feet routinely for any wounds.   You should always check your shoes for any debris that could cause a wound.            Relevant Medications    linagliptin (Tradjenta) 5 MG tablet tablet    glimepiride (AMARYL) 2 MG tablet    Other Relevant Orders    Basic Metabolic Panel    Hemoglobin A1c       Genitourinary and Reproductive     Erectile  dysfunction (Chronic)    Relevant Medications    tadalafil (Cialis) 20 MG tablet    Stage 3a chronic kidney disease (HCC) (Chronic)    Current Assessment & Plan     Lab Results   Component Value Date    CREATININE 1.33 (H) 11/01/2022        Avoid nephrotoxic medications - especially nsaids (like Ibuprofen, aleve)   Maintain blood pressure control  Maintain blood sugar control           Other Visit Diagnoses     Diabetes mellitus without complication (HCC)        Relevant Medications    linagliptin (Tradjenta) 5 MG tablet tablet    amLODIPine (NORVASC) 10 MG tablet    ezetimibe (ZETIA) 10 MG tablet    glimepiride (AMARYL) 2 MG tablet    Hypertension, essential        Relevant Medications    linagliptin (Tradjenta) 5 MG tablet tablet    amLODIPine (NORVASC) 10 MG tablet    ezetimibe (ZETIA) 10 MG tablet    lisinopril (PRINIVIL,ZESTRIL) 20 MG tablet    metoprolol succinate XL (TOPROL-XL) 50 MG 24 hr tablet    Mixed hyperlipidemia        Relevant Medications    linagliptin (Tradjenta) 5 MG tablet tablet    amLODIPine (NORVASC) 10 MG tablet    ezetimibe (ZETIA) 10 MG tablet    pitavastatin calcium (Livalo) 2 MG tablet tablet    Urine frequency        Relevant Medications    linagliptin (Tradjenta) 5 MG tablet tablet    amLODIPine (NORVASC) 10 MG tablet    ezetimibe (ZETIA) 10 MG tablet    Anemia, unspecified type        Relevant Orders    Ferritin    CBC (No Diff)          Follow Up {Instructions Charge Capture  Follow-up Communications :23}     Return in about 6 months (around 7/3/2023) for Medicare Wellness.      Patient was given instructions and counseling regarding his condition or for health maintenance advice. Please see specific information pulled into the AVS if appropriate.    Lilian disclaimer:   Much of this encounter note is an electronic transcription/translation of spoken language to printed text. The electronic translation of spoken language may permit erroneous, or at times, nonsensical words or  phrases to be inadvertently transcribed; Although I have reviewed the note for such errors, some may still exist.     Additional Patient Counseling:       There are no Patient Instructions on file for this visit.

## 2023-01-03 NOTE — ASSESSMENT & PLAN NOTE
Your last A1C (3 month average) =   Lab Results   Component Value Date    HGBA1C 6.1 (H) 06/21/2022      Your diabetes is Controlled.    If A1C still well controlled.     Eye Health:   You need a diabetic eye exam yearly.   Please have a copy of the note faxed to my office.   Fax: [phone]    Foot Health:   You need a diabetic foot exam yearly.   Check your feet routinely for any wounds.   You should always check your shoes for any debris that could cause a wound.

## 2023-01-03 NOTE — ASSESSMENT & PLAN NOTE
Lab Results   Component Value Date    CHOL 159 11/24/2020    CHLPL 160 06/21/2022    TRIG 92 06/21/2022    HDL 75 06/21/2022    LDL 68 06/21/2022     Improved on treatment - continue current dose.

## 2023-01-03 NOTE — ASSESSMENT & PLAN NOTE
Hypertension is improving with treatment.  Continue current treatment regimen.  Dietary sodium restriction.  Regular aerobic exercise.  Continue current medications.  Blood pressure will be reassessed at the next regular appointment.

## 2023-01-04 LAB
BUN SERPL-MCNC: 18 MG/DL (ref 8–23)
BUN/CREAT SERPL: 12.9 (ref 7–25)
CALCIUM SERPL-MCNC: 9 MG/DL (ref 8.6–10.5)
CHLORIDE SERPL-SCNC: 105 MMOL/L (ref 98–107)
CO2 SERPL-SCNC: 27.2 MMOL/L (ref 22–29)
CREAT SERPL-MCNC: 1.39 MG/DL (ref 0.76–1.27)
EGFRCR SERPLBLD CKD-EPI 2021: 49.1 ML/MIN/1.73
ERYTHROCYTE [DISTWIDTH] IN BLOOD BY AUTOMATED COUNT: 13.5 % (ref 12.3–15.4)
FERRITIN SERPL-MCNC: 234 NG/ML (ref 30–400)
GLUCOSE SERPL-MCNC: 102 MG/DL (ref 65–99)
HBA1C MFR BLD: 6.1 % (ref 4.8–5.6)
HCT VFR BLD AUTO: 35.2 % (ref 37.5–51)
HGB BLD-MCNC: 10.5 G/DL (ref 13–17.7)
MCH RBC QN AUTO: 24.2 PG (ref 26.6–33)
MCHC RBC AUTO-ENTMCNC: 29.8 G/DL (ref 31.5–35.7)
MCV RBC AUTO: 81.1 FL (ref 79–97)
PLATELET # BLD AUTO: 243 10*3/MM3 (ref 140–450)
POTASSIUM SERPL-SCNC: 4.7 MMOL/L (ref 3.5–5.2)
RBC # BLD AUTO: 4.34 10*6/MM3 (ref 4.14–5.8)
SODIUM SERPL-SCNC: 143 MMOL/L (ref 136–145)
WBC # BLD AUTO: 6.23 10*3/MM3 (ref 3.4–10.8)

## 2023-01-27 ENCOUNTER — HOSPITAL ENCOUNTER (OUTPATIENT)
Dept: CARDIOLOGY | Facility: HOSPITAL | Age: 88
Discharge: HOME | End: 2023-01-27
Admitting: NURSE PRACTITIONER
Payer: MEDICARE

## 2023-01-27 ENCOUNTER — OFFICE VISIT (OUTPATIENT)
Dept: CARDIOLOGY | Facility: CLINIC | Age: 88
End: 2023-01-27
Payer: MEDICARE

## 2023-01-27 VITALS
SYSTOLIC BLOOD PRESSURE: 208 MMHG | HEIGHT: 68 IN | DIASTOLIC BLOOD PRESSURE: 90 MMHG | BODY MASS INDEX: 22.28 KG/M2 | OXYGEN SATURATION: 92 % | HEART RATE: 82 BPM | WEIGHT: 147 LBS

## 2023-01-27 VITALS — DIASTOLIC BLOOD PRESSURE: 88 MMHG | SYSTOLIC BLOOD PRESSURE: 201 MMHG

## 2023-01-27 DIAGNOSIS — E78.5 DYSLIPIDEMIA: Chronic | ICD-10-CM

## 2023-01-27 DIAGNOSIS — I65.21 STENOSIS OF RIGHT CAROTID ARTERY: ICD-10-CM

## 2023-01-27 DIAGNOSIS — I10 ESSENTIAL HYPERTENSION: Primary | ICD-10-CM

## 2023-01-27 DIAGNOSIS — R09.89 RIGHT CAROTID BRUIT: ICD-10-CM

## 2023-01-27 DIAGNOSIS — Z90.49 HISTORY OF COLON RESECTION: ICD-10-CM

## 2023-01-27 DIAGNOSIS — I25.10 CORONARY ARTERY DISEASE INVOLVING NATIVE CORONARY ARTERY OF NATIVE HEART WITHOUT ANGINA PECTORIS: Chronic | ICD-10-CM

## 2023-01-27 DIAGNOSIS — N18.31 STAGE 3A CHRONIC KIDNEY DISEASE: Chronic | ICD-10-CM

## 2023-01-27 PROCEDURE — 93000 ELECTROCARDIOGRAM COMPLETE: CPT | Performed by: NURSE PRACTITIONER

## 2023-01-27 PROCEDURE — 25010000002 HYDRALAZINE PER 20 MG: Performed by: NURSE PRACTITIONER

## 2023-01-27 PROCEDURE — 96374 THER/PROPH/DIAG INJ IV PUSH: CPT

## 2023-01-27 PROCEDURE — 99214 OFFICE O/P EST MOD 30 MIN: CPT | Performed by: NURSE PRACTITIONER

## 2023-01-27 PROCEDURE — 36415 COLL VENOUS BLD VENIPUNCTURE: CPT

## 2023-01-27 RX ORDER — HYDRALAZINE HYDROCHLORIDE 20 MG/ML
10 INJECTION INTRAMUSCULAR; INTRAVENOUS ONCE
Status: COMPLETED | OUTPATIENT
Start: 2023-01-27 | End: 2023-01-27

## 2023-01-27 RX ADMIN — HYDRALAZINE HYDROCHLORIDE 10 MG: 20 INJECTION INTRAMUSCULAR; INTRAVENOUS at 12:15

## 2023-01-27 NOTE — PROGRESS NOTES
Date of Office Visit: 2023  Encounter Provider: THOMAS Reed  Place of Service: [facility] CARDIOLOGY  Patient Name: Gera Lira  :1935    Chief Complaint   Patient presents with   • Follow-up   • Coronary Artery Disease   • Hypertension   :     HPI: Gera Lira is a 87 y.o. male who is a patient of Dr. Vidales previously and is new to me today.  He has a history of peripheral vascular disease, coronary disease, hypertension and diabetes mellitus.  He had bypass surgery in .  His LV function has been stable.  In 2017 he was noted to have stenosis of the RCA.  Overall he has been fairly stable over the last couple of years.  He has chronic kidney disease.  He has been having some issues with urinary obstruction possibly and is scheduled to undergo cystoscopy and possible biopsy.  He was told to stop his ACE inhibitor but accidentally stopped all of his blood pressure medicines.  His blood pressure is 208/90 today.  His recent cholesterol is at goal.  He denies any chest pain, pressure or tightness.  Previous testing and notes have been reviewed by me.   Past Medical History:   Diagnosis Date   • Adenoma of sigmoid colon    • Angina, class III (HCC)    • CKD (chronic kidney disease), stage III (HCC)    • Coronary artery disease    • Diabetes mellitus (HCC)    • Dyslipidemia    • Essential hypertension    • History of MI (myocardial infarction)    • History of prostate cancer     HX RADIATION    • Hyperlipidemia    • Hypertension    • S/P CABG (coronary artery bypass graft)        Past Surgical History:   Procedure Laterality Date   • CARDIAC CATHETERIZATION     • COLON RESECTION N/A 3/4/2021    Procedure: COLON RESECTION LAPAROSCOPIC SIGMOID WITH DAVINCI ROBOT, FLEXIBLE SIGMOIDOSCOPY;  Surgeon: Darek Adams MD;  Location: [facility];  Service: [facility];  Laterality: N/A;   • COLONOSCOPY     • COLONOSCOPY N/A 2020    Procedure:  COLONOSCOPY to cecum with cold polypectomies and hot snare polypectomy with tattoo;  Surgeon: Darek Adams MD;  Location: [facility] ENDOSCOPY;  Service: General;  Laterality: N/A;  pre - rectal bleeding  post - polyps, distal sigmoid mass   • CORONARY ARTERY BYPASS GRAFT     • LUMBAR DISC SURGERY         Social History     Socioeconomic History   • Marital status:    Tobacco Use   • Smoking status: Former     Packs/day: 0.50     Years: 20.00     Pack years: 10.00     Types: Cigarettes     Quit date: 1970     Years since quittin.1   • Smokeless tobacco: Never   • Tobacco comments:     Caffeine - Yes   Vaping Use   • Vaping Use: Never used   Substance and Sexual Activity   • Alcohol use: No   • Drug use: No   • Sexual activity: Defer       Family History   Problem Relation Age of Onset   • Coronary artery disease Father    • Hypertension Father    • Breast cancer Sister    • Cerebral aneurysm Sister    • Diabetes Sister    • No Known Problems Mother    • Malig Hyperthermia Neg Hx        ROS    No Known Allergies      Current Outpatient Medications:   •  ALBUTEROL SULFATE  (90 Base) MCG/ACT inhaler, INHALE 2 PUFFS BY MOUTH EVERY 4 HOURS AS NEEDED (Patient taking differently: Inhale 2 puffs Every 4 (Four) Hours As Needed.), Disp: 42.5 g, Rfl: 0  •  amLODIPine (NORVASC) 10 MG tablet, Take 1 tablet by mouth Daily., Disp: 90 tablet, Rfl: 1  •  aspirin (aspirin) 81 MG EC tablet, Take 1 tablet by mouth Daily., Disp: , Rfl:   •  ezetimibe (ZETIA) 10 MG tablet, Take 1 tablet by mouth Daily., Disp: 90 tablet, Rfl: 3  •  glimepiride (AMARYL) 2 MG tablet, Take 1 tablet by mouth Every Morning Before Breakfast., Disp: 90 tablet, Rfl: 1  •  glucose blood test strip, Test once daily, Disp: 30 each, Rfl: 12  •  hydrALAZINE (APRESOLINE) 25 MG tablet, TAKE 1 TABLET BY MOUTH TWICE DAILY, Disp: 180 tablet, Rfl: 2  •  linagliptin (Tradjenta) 5 MG tablet tablet, Take 1 tablet by mouth Daily., Disp: 90 tablet,  "Rfl: 1  •  lisinopril (PRINIVIL,ZESTRIL) 20 MG tablet, Take 1 tablet by mouth Daily., Disp: 90 tablet, Rfl: 1  •  metoprolol succinate XL (TOPROL-XL) 50 MG 24 hr tablet, Take 1 tablet by mouth Every Night., Disp: 90 tablet, Rfl: 1  •  Omega-3 Fatty Acids (FISH OIL) 1200 MG capsule capsule, Take 1,200 mg by mouth Every Night. HELD FOR OR, Disp: , Rfl:   •  pitavastatin calcium (Livalo) 2 MG tablet tablet, Take 1 tablet by mouth Every Night., Disp: 90 tablet, Rfl: 1  •  tadalafil (Cialis) 20 MG tablet, Take 1 tablet by mouth Daily., Disp: 9 tablet, Rfl: 5  No current facility-administered medications for this visit.      Objective:     Vitals:    01/27/23 1139   BP: (!) 208/90   Pulse: 82   SpO2: 92%   Weight: 66.7 kg (147 lb)   Height: 172.7 cm (68\")     Body mass index is 22.35 kg/m².    PHYSICAL EXAM:    Physical Exam      ECG 12 Lead    Date/Time: 1/27/2023 12:21 PM  Performed by: Jolynn Ortiz APRN  Authorized by: Jolynn Ortiz APRN   Comparison: compared with previous ECG from 6/14/2022  Similar to previous ECG  Rhythm: sinus rhythm  Rate: normal  ST Depression: II, III and aVF  QRS axis: normal    Clinical impression: non-specific ECG              Assessment:       Diagnosis Plan   1. Essential hypertension  hydrALAZINE (APRESOLINE) injection 10 mg      2. Coronary artery disease involving native coronary artery of native heart without angina pectoris        3. Dyslipidemia        4. Right carotid bruit        5. Stenosis of right carotid artery        6. History of colon resection / unresectable colon polyp        7. Stage 3a chronic kidney disease (HCC)          Orders Placed This Encounter   Procedures   • ECG 12 Lead     This order was created via procedure documentation     Order Specific Question:   Release to patient     Answer:   Routine Release          Plan:       He is cleared for procedure with acceptable risk.  I am going to bring him back in 2 weeks to follow-up.  I told him to go home " and take his blood pressure medications and we are going to give him some hydralazine in the office today.         Your medication list          Accurate as of January 27, 2023 12:23 PM. If you have any questions, ask your nurse or doctor.            CONTINUE taking these medications      Instructions Last Dose Given Next Dose Due   albuterol sulfate  (90 Base) MCG/ACT inhaler  Commonly known as: PROVENTIL HFA;VENTOLIN HFA;PROAIR HFA      INHALE 2 PUFFS BY MOUTH EVERY 4 HOURS AS NEEDED       amLODIPine 10 MG tablet  Commonly known as: NORVASC      Take 1 tablet by mouth Daily.       aspirin 81 MG EC tablet      Take 1 tablet by mouth Daily.       ezetimibe 10 MG tablet  Commonly known as: ZETIA      Take 1 tablet by mouth Daily.       fish oil 1200 MG capsule capsule      Take 1,200 mg by mouth Every Night. HELD FOR OR       glimepiride 2 MG tablet  Commonly known as: AMARYL      Take 1 tablet by mouth Every Morning Before Breakfast.       glucose blood test strip      Test once daily       hydrALAZINE 25 MG tablet  Commonly known as: APRESOLINE      TAKE 1 TABLET BY MOUTH TWICE DAILY       linagliptin 5 MG tablet tablet  Commonly known as: Tradjenta      Take 1 tablet by mouth Daily.       lisinopril 20 MG tablet  Commonly known as: PRINIVIL,ZESTRIL      Take 1 tablet by mouth Daily.       metoprolol succinate XL 50 MG 24 hr tablet  Commonly known as: TOPROL-XL      Take 1 tablet by mouth Every Night.       pitavastatin calcium 2 MG tablet tablet  Commonly known as: Livalo      Take 1 tablet by mouth Every Night.       tadalafil 20 MG tablet  Commonly known as: Cialis      Take 1 tablet by mouth Daily.                As always, it has been a pleasure to participate in your patient's care.      Sincerely,     Jolynn GUZMAN

## 2023-02-13 ENCOUNTER — OFFICE VISIT (OUTPATIENT)
Dept: CARDIOLOGY | Facility: CLINIC | Age: 88
End: 2023-02-13
Payer: MEDICARE

## 2023-02-13 VITALS
DIASTOLIC BLOOD PRESSURE: 96 MMHG | WEIGHT: 150 LBS | HEIGHT: 68 IN | SYSTOLIC BLOOD PRESSURE: 179 MMHG | HEART RATE: 44 BPM | BODY MASS INDEX: 22.73 KG/M2 | OXYGEN SATURATION: 93 %

## 2023-02-13 DIAGNOSIS — E78.5 DYSLIPIDEMIA: Chronic | ICD-10-CM

## 2023-02-13 DIAGNOSIS — I65.21 STENOSIS OF RIGHT CAROTID ARTERY: ICD-10-CM

## 2023-02-13 DIAGNOSIS — E11.59 TYPE 2 DIABETES MELLITUS WITH OTHER CIRCULATORY COMPLICATION, WITHOUT LONG-TERM CURRENT USE OF INSULIN: Chronic | ICD-10-CM

## 2023-02-13 DIAGNOSIS — I10 ESSENTIAL HYPERTENSION: Chronic | ICD-10-CM

## 2023-02-13 DIAGNOSIS — N18.31 STAGE 3A CHRONIC KIDNEY DISEASE: Chronic | ICD-10-CM

## 2023-02-13 DIAGNOSIS — I25.10 CORONARY ARTERY DISEASE INVOLVING NATIVE CORONARY ARTERY OF NATIVE HEART WITHOUT ANGINA PECTORIS: Primary | Chronic | ICD-10-CM

## 2023-02-13 PROCEDURE — 93000 ELECTROCARDIOGRAM COMPLETE: CPT | Performed by: NURSE PRACTITIONER

## 2023-02-13 PROCEDURE — 99214 OFFICE O/P EST MOD 30 MIN: CPT | Performed by: NURSE PRACTITIONER

## 2023-02-13 RX ORDER — LISINOPRIL 40 MG/1
40 TABLET ORAL DAILY
Qty: 90 TABLET | Refills: 3 | Status: SHIPPED | OUTPATIENT
Start: 2023-02-13

## 2023-02-13 NOTE — PROGRESS NOTES
Date of Office Visit: 2023  Encounter Provider: THOMAS Reed  Place of Service: [facility] CARDIOLOGY  Patient Name: Gera Lira  :1935    Chief Complaint   Patient presents with   • Follow-up   • Coronary Artery Disease   • Hypertension   :     HPI: Gera Lira is a 87 y.o. male who is a patient of Dr. Vidales previously and is new to me today.  He has a history of peripheral vascular disease, coronary disease, hypertension and diabetes mellitus.  He had bypass surgery in .  His LV function has been stable.  In 2017 he was noted to have stenosis of the RCA.  Overall he has been fairly stable over the last couple of years.  He has chronic kidney disease.  He has been having some issues with urinary obstruction possibly and is scheduled to undergo cystoscopy and possible biopsy.  He was told to stop his ACE inhibitor but accidentally stopped all of his blood pressure medicines.  His recent cholesterol was at goal.  I saw him in the office on  and his blood pressure was elevated at 208/90.  He had not taken any of his blood pressure medications.  I had him go home and take them and gave him some hydralazine in the office.  He comes in today for follow-up.    So his blood pressure is still high.  He comes in today stating he has not taken his medicines.  Turns out he takes his medicines at night.  He did take them last night.  So technically his blood pressure should be controlled this morning.  He denies any chest pain, headache or shortness of breath.  Previous testing and notes have been reviewed by me.   Past Medical History:   Diagnosis Date   • Adenoma of sigmoid colon    • Angina, class III (HCC)    • CKD (chronic kidney disease), stage III (HCC)    • Coronary artery disease    • Diabetes mellitus (HCC)    • Dyslipidemia    • Essential hypertension    • History of MI (myocardial infarction)    • History of prostate cancer     HX  RADIATION    • Hyperlipidemia    • Hypertension    • S/P CABG (coronary artery bypass graft)        Past Surgical History:   Procedure Laterality Date   • CARDIAC CATHETERIZATION     • COLON RESECTION N/A 3/4/2021    Procedure: COLON RESECTION LAPAROSCOPIC SIGMOID WITH DAVINCI ROBOT, FLEXIBLE SIGMOIDOSCOPY;  Surgeon: Darek Adams MD;  Location: [facility] MAIN OR;  Service: DaVinci;  Laterality: N/A;   • COLONOSCOPY     • COLONOSCOPY N/A 2020    Procedure: COLONOSCOPY to cecum with cold polypectomies and hot snare polypectomy with tattoo;  Surgeon: Darek Adams MD;  Location: [facility] ENDOSCOPY;  Service: General;  Laterality: N/A;  pre - rectal bleeding  post - polyps, distal sigmoid mass   • CORONARY ARTERY BYPASS GRAFT     • LUMBAR DISC SURGERY         Social History     Socioeconomic History   • Marital status:    Tobacco Use   • Smoking status: Former     Packs/day: 0.50     Years: 20.00     Pack years: 10.00     Types: Cigarettes     Quit date: 1970     Years since quittin.1   • Smokeless tobacco: Never   • Tobacco comments:     Caffeine - Yes   Vaping Use   • Vaping Use: Never used   Substance and Sexual Activity   • Alcohol use: No   • Drug use: No   • Sexual activity: Defer       Family History   Problem Relation Age of Onset   • Coronary artery disease Father    • Hypertension Father    • Breast cancer Sister    • Cerebral aneurysm Sister    • Diabetes Sister    • No Known Problems Mother    • Malig Hyperthermia Neg Hx        Review of Systems   Constitutional: Negative for diaphoresis and malaise/fatigue.   Cardiovascular: Negative for chest pain, claudication, dyspnea on exertion, irregular heartbeat, leg swelling, near-syncope, orthopnea, palpitations, paroxysmal nocturnal dyspnea and syncope.   Respiratory: Negative for cough, shortness of breath and sleep disturbances due to breathing.    Musculoskeletal: Negative for falls.   Neurological: Negative for dizziness and  "weakness.   Psychiatric/Behavioral: Negative for altered mental status and substance abuse.       No Known Allergies      Current Outpatient Medications:   •  ALBUTEROL SULFATE  (90 Base) MCG/ACT inhaler, INHALE 2 PUFFS BY MOUTH EVERY 4 HOURS AS NEEDED (Patient taking differently: Inhale 2 puffs Every 4 (Four) Hours As Needed.), Disp: 42.5 g, Rfl: 0  •  amLODIPine (NORVASC) 10 MG tablet, Take 1 tablet by mouth Daily., Disp: 90 tablet, Rfl: 1  •  aspirin (aspirin) 81 MG EC tablet, Take 1 tablet by mouth Daily., Disp: , Rfl:   •  ezetimibe (ZETIA) 10 MG tablet, Take 1 tablet by mouth Daily., Disp: 90 tablet, Rfl: 3  •  glimepiride (AMARYL) 2 MG tablet, Take 1 tablet by mouth Every Morning Before Breakfast., Disp: 90 tablet, Rfl: 1  •  glucose blood test strip, Test once daily, Disp: 30 each, Rfl: 12  •  hydrALAZINE (APRESOLINE) 25 MG tablet, TAKE 1 TABLET BY MOUTH TWICE DAILY, Disp: 180 tablet, Rfl: 2  •  linagliptin (Tradjenta) 5 MG tablet tablet, Take 1 tablet by mouth Daily., Disp: 90 tablet, Rfl: 1  •  lisinopril (PRINIVIL,ZESTRIL) 20 MG tablet, Take 1 tablet by mouth Daily., Disp: 90 tablet, Rfl: 1  •  metoprolol succinate XL (TOPROL-XL) 50 MG 24 hr tablet, Take 1 tablet by mouth Every Night., Disp: 90 tablet, Rfl: 1  •  Omega-3 Fatty Acids (FISH OIL) 1200 MG capsule capsule, Take 1,200 mg by mouth Every Night. HELD FOR OR, Disp: , Rfl:   •  pitavastatin calcium (Livalo) 2 MG tablet tablet, Take 1 tablet by mouth Every Night., Disp: 90 tablet, Rfl: 1  •  tadalafil (Cialis) 20 MG tablet, Take 1 tablet by mouth Daily., Disp: 9 tablet, Rfl: 5      Objective:     Vitals:    02/13/23 1331   BP: 179/96   Pulse: (!) 44   SpO2: 93%   Weight: 68 kg (150 lb)   Height: 172.7 cm (68\")     Body mass index is 22.81 kg/m².    PHYSICAL EXAM:    Constitutional:       General: Not in acute distress.     Appearance: Normal appearance. Well-developed.   Eyes:      Pupils: Pupils are equal, round, and reactive to light. "   HENT:      Head: Normocephalic.   Neck:      Vascular: No carotid bruit or JVD.   Pulmonary:      Effort: Pulmonary effort is normal. No tachypnea.      Breath sounds: Normal breath sounds. No wheezing. No rales.   Cardiovascular:      Normal rate. Regular rhythm.      No gallop.   Pulses:     Intact distal pulses.   Edema:     Peripheral edema absent.   Abdominal:      General: Bowel sounds are normal.      Palpations: Abdomen is soft.      Tenderness: There is no abdominal tenderness.   Musculoskeletal: Normal range of motion.      Cervical back: Normal range of motion and neck supple. No edema. Skin:     General: Skin is warm and dry.   Neurological:      Mental Status: Alert and oriented to person, place, and time.           ECG 12 Lead    Date/Time: 2/13/2023 1:44 PM  Performed by: Jolynn Ortiz APRN  Authorized by: Jolynn Ortiz APRN   Comparison: compared with previous ECG from 1/27/2023  Similar to previous ECG  Rhythm: sinus rhythm  Rate: normal  ST Depression: II, III and aVF  T inversion: III and aVF  QRS axis: right  Other findings: non-specific ST-T wave changes    Clinical impression: non-specific ECG              Assessment:       Diagnosis Plan   1. Coronary artery disease involving native coronary artery of native heart without angina pectoris        2. Dyslipidemia        3. Essential hypertension        4. Stenosis of right carotid artery        5. Stage 3a chronic kidney disease (HCC)          No orders of the defined types were placed in this encounter.         Plan:       1.  History of coronary disease-on statin, beta-blocker and ACE.  No anginal symptoms  2.  Essential hypertension-we will increase lisinopril to 40 mg daily.  I would like to make it twice a day however I do not think he would be compliant with the twice a day medication.    I had a long discussion with him about regulating his blood pressure.  I recommended that he take them at the same time every day.  I want him  to bring in his pill bottles with his next visit so we can verify what he is actually taking.  We will bring him back in a month.         Your medication list          Accurate as of February 13, 2023  1:42 PM. If you have any questions, ask your nurse or doctor.            CONTINUE taking these medications      Instructions Last Dose Given Next Dose Due   albuterol sulfate  (90 Base) MCG/ACT inhaler  Commonly known as: PROVENTIL HFA;VENTOLIN HFA;PROAIR HFA      INHALE 2 PUFFS BY MOUTH EVERY 4 HOURS AS NEEDED       amLODIPine 10 MG tablet  Commonly known as: NORVASC      Take 1 tablet by mouth Daily.       aspirin 81 MG EC tablet      Take 1 tablet by mouth Daily.       ezetimibe 10 MG tablet  Commonly known as: ZETIA      Take 1 tablet by mouth Daily.       fish oil 1200 MG capsule capsule      Take 1,200 mg by mouth Every Night. HELD FOR OR       glimepiride 2 MG tablet  Commonly known as: AMARYL      Take 1 tablet by mouth Every Morning Before Breakfast.       glucose blood test strip      Test once daily       hydrALAZINE 25 MG tablet  Commonly known as: APRESOLINE      TAKE 1 TABLET BY MOUTH TWICE DAILY       linagliptin 5 MG tablet tablet  Commonly known as: Tradjenta      Take 1 tablet by mouth Daily.       lisinopril 20 MG tablet  Commonly known as: PRINIVIL,ZESTRIL      Take 1 tablet by mouth Daily.       metoprolol succinate XL 50 MG 24 hr tablet  Commonly known as: TOPROL-XL      Take 1 tablet by mouth Every Night.       pitavastatin calcium 2 MG tablet tablet  Commonly known as: Livalo      Take 1 tablet by mouth Every Night.       tadalafil 20 MG tablet  Commonly known as: Cialis      Take 1 tablet by mouth Daily.                As always, it has been a pleasure to participate in your patient's care.      Sincerely,     Jolynn GUZMAN

## 2023-04-18 ENCOUNTER — TELEPHONE (OUTPATIENT)
Dept: INTERNAL MEDICINE | Facility: CLINIC | Age: 88
End: 2023-04-18
Payer: MEDICARE

## 2023-04-18 ENCOUNTER — APPOINTMENT (OUTPATIENT)
Dept: CT IMAGING | Facility: HOSPITAL | Age: 88
End: 2023-04-18
Payer: MEDICARE

## 2023-04-18 ENCOUNTER — HOSPITAL ENCOUNTER (EMERGENCY)
Facility: HOSPITAL | Age: 88
Discharge: HOME | End: 2023-04-18
Attending: EMERGENCY MEDICINE | Admitting: EMERGENCY MEDICINE
Payer: MEDICARE

## 2023-04-18 VITALS
HEART RATE: 64 BPM | DIASTOLIC BLOOD PRESSURE: 80 MMHG | HEIGHT: 68 IN | WEIGHT: 145 LBS | SYSTOLIC BLOOD PRESSURE: 168 MMHG | TEMPERATURE: 98.6 F | RESPIRATION RATE: 16 BRPM | OXYGEN SATURATION: 98 % | BODY MASS INDEX: 21.98 KG/M2

## 2023-04-18 DIAGNOSIS — E11.9 DIABETES MELLITUS WITHOUT COMPLICATION: ICD-10-CM

## 2023-04-18 DIAGNOSIS — I10 POORLY-CONTROLLED HYPERTENSION: Primary | ICD-10-CM

## 2023-04-18 DIAGNOSIS — E78.2 MIXED HYPERLIPIDEMIA: ICD-10-CM

## 2023-04-18 DIAGNOSIS — R51.9 ACUTE NONINTRACTABLE HEADACHE, UNSPECIFIED HEADACHE TYPE: ICD-10-CM

## 2023-04-18 DIAGNOSIS — I10 HYPERTENSION, ESSENTIAL: ICD-10-CM

## 2023-04-18 DIAGNOSIS — R35.0 URINE FREQUENCY: ICD-10-CM

## 2023-04-18 LAB
ALBUMIN SERPL-MCNC: 4.1 G/DL (ref 3.5–5.2)
ALBUMIN/GLOB SERPL: 1.5 G/DL
ALP SERPL-CCNC: 52 U/L (ref 39–117)
ALT SERPL W P-5'-P-CCNC: 16 U/L (ref 1–41)
ANION GAP SERPL CALCULATED.3IONS-SCNC: 10.2 MMOL/L (ref 5–15)
AST SERPL-CCNC: 13 U/L (ref 1–40)
BASOPHILS # BLD AUTO: 0.02 10*3/MM3 (ref 0–0.2)
BASOPHILS NFR BLD AUTO: 0.3 % (ref 0–1.5)
BILIRUB SERPL-MCNC: 0.4 MG/DL (ref 0–1.2)
BUN SERPL-MCNC: 15 MG/DL (ref 8–23)
BUN/CREAT SERPL: 11.3 (ref 7–25)
CALCIUM SPEC-SCNC: 9.2 MG/DL (ref 8.6–10.5)
CHLORIDE SERPL-SCNC: 104 MMOL/L (ref 98–107)
CO2 SERPL-SCNC: 27.8 MMOL/L (ref 22–29)
CREAT SERPL-MCNC: 1.33 MG/DL (ref 0.76–1.27)
DEPRECATED RDW RBC AUTO: 36.8 FL (ref 37–54)
EGFRCR SERPLBLD CKD-EPI 2021: 51.4 ML/MIN/1.73
EOSINOPHIL # BLD AUTO: 0.04 10*3/MM3 (ref 0–0.4)
EOSINOPHIL NFR BLD AUTO: 0.6 % (ref 0.3–6.2)
ERYTHROCYTE [DISTWIDTH] IN BLOOD BY AUTOMATED COUNT: 12.4 % (ref 12.3–15.4)
GLOBULIN UR ELPH-MCNC: 2.8 GM/DL
GLUCOSE SERPL-MCNC: 151 MG/DL (ref 65–99)
HCT VFR BLD AUTO: 35.3 % (ref 37.5–51)
HGB BLD-MCNC: 10.8 G/DL (ref 13–17.7)
IMM GRANULOCYTES # BLD AUTO: 0.02 10*3/MM3 (ref 0–0.05)
IMM GRANULOCYTES NFR BLD AUTO: 0.3 % (ref 0–0.5)
LYMPHOCYTES # BLD AUTO: 0.97 10*3/MM3 (ref 0.7–3.1)
LYMPHOCYTES NFR BLD AUTO: 14.6 % (ref 19.6–45.3)
MCH RBC QN AUTO: 25.1 PG (ref 26.6–33)
MCHC RBC AUTO-ENTMCNC: 30.6 G/DL (ref 31.5–35.7)
MCV RBC AUTO: 82.1 FL (ref 79–97)
MONOCYTES # BLD AUTO: 0.55 10*3/MM3 (ref 0.1–0.9)
MONOCYTES NFR BLD AUTO: 8.3 % (ref 5–12)
NEUTROPHILS NFR BLD AUTO: 5.05 10*3/MM3 (ref 1.7–7)
NEUTROPHILS NFR BLD AUTO: 75.9 % (ref 42.7–76)
NRBC BLD AUTO-RTO: 0 /100 WBC (ref 0–0.2)
PLATELET # BLD AUTO: 260 10*3/MM3 (ref 140–450)
PMV BLD AUTO: 11.3 FL (ref 6–12)
POTASSIUM SERPL-SCNC: 3.9 MMOL/L (ref 3.5–5.2)
PROT SERPL-MCNC: 6.9 G/DL (ref 6–8.5)
RBC # BLD AUTO: 4.3 10*6/MM3 (ref 4.14–5.8)
SODIUM SERPL-SCNC: 142 MMOL/L (ref 136–145)
WBC NRBC COR # BLD: 6.65 10*3/MM3 (ref 3.4–10.8)

## 2023-04-18 PROCEDURE — 25010000002 MORPHINE PER 10 MG: Performed by: EMERGENCY MEDICINE

## 2023-04-18 PROCEDURE — 70450 CT HEAD/BRAIN W/O DYE: CPT

## 2023-04-18 PROCEDURE — 96375 TX/PRO/DX INJ NEW DRUG ADDON: CPT

## 2023-04-18 PROCEDURE — 99284 EMERGENCY DEPT VISIT MOD MDM: CPT

## 2023-04-18 PROCEDURE — 85025 COMPLETE CBC W/AUTO DIFF WBC: CPT | Performed by: PHYSICIAN ASSISTANT

## 2023-04-18 PROCEDURE — 80053 COMPREHEN METABOLIC PANEL: CPT | Performed by: PHYSICIAN ASSISTANT

## 2023-04-18 PROCEDURE — 96374 THER/PROPH/DIAG INJ IV PUSH: CPT

## 2023-04-18 RX ORDER — AMLODIPINE BESYLATE 10 MG/1
10 TABLET ORAL DAILY
Qty: 30 TABLET | Refills: 0 | Status: SHIPPED | OUTPATIENT
Start: 2023-04-18 | End: 2023-04-21 | Stop reason: SDUPTHER

## 2023-04-18 RX ORDER — LABETALOL HYDROCHLORIDE 5 MG/ML
10 INJECTION, SOLUTION INTRAVENOUS ONCE
Status: COMPLETED | OUTPATIENT
Start: 2023-04-18 | End: 2023-04-18

## 2023-04-18 RX ORDER — LISINOPRIL 20 MG/1
20 TABLET ORAL
Status: DISCONTINUED | OUTPATIENT
Start: 2023-04-18 | End: 2023-04-18 | Stop reason: HOSPADM

## 2023-04-18 RX ORDER — MORPHINE SULFATE 2 MG/ML
2 INJECTION, SOLUTION INTRAMUSCULAR; INTRAVENOUS ONCE
Status: COMPLETED | OUTPATIENT
Start: 2023-04-18 | End: 2023-04-18

## 2023-04-18 RX ORDER — AMLODIPINE BESYLATE 5 MG/1
10 TABLET ORAL
Status: DISCONTINUED | OUTPATIENT
Start: 2023-04-18 | End: 2023-04-18 | Stop reason: HOSPADM

## 2023-04-18 RX ADMIN — MORPHINE SULFATE 2 MG: 2 INJECTION, SOLUTION INTRAMUSCULAR; INTRAVENOUS at 15:26

## 2023-04-18 RX ADMIN — LABETALOL HYDROCHLORIDE 10 MG: 5 INJECTION, SOLUTION INTRAVENOUS at 15:24

## 2023-04-18 RX ADMIN — SODIUM CHLORIDE 500 ML: 9 INJECTION, SOLUTION INTRAVENOUS at 12:09

## 2023-04-18 RX ADMIN — METOPROLOL TARTRATE 25 MG: 25 TABLET, FILM COATED ORAL at 12:07

## 2023-04-18 RX ADMIN — AMLODIPINE BESYLATE 10 MG: 5 TABLET ORAL at 12:07

## 2023-04-18 RX ADMIN — LISINOPRIL 20 MG: 20 TABLET ORAL at 12:08

## 2023-04-18 NOTE — ED PROVIDER NOTES
MD ATTESTATION NOTE    The GUSTAVO and I have discussed this patient's history, physical exam, and treatment plan.  I have reviewed the documentation and personally had a face to face interaction with the patient. I affirm the documentation and agree with the treatment and plan.  The attached note describes my personal findings.      I provided a substantive portion of the care of the patient.  I personally performed the physical exam in its entirety, and below are my findings.  For this patient encounter, the patient wore surgical mask, I wore full protective PPE including N95 and eye protection.      Brief HPI: Patient complains of generalized headache for the past 2 days.  Nothing makes the pain better or worse.  Denies recent head injury.  Wife reports that his blood pressure has been elevated for the past 2 days.  Wife reports that he ran out of his amlodipine but she is unsure when.  Denies fever, chills, neck pain, photophobia, nausea, vomiting, dizziness, vision changes, or numbness/tingling/weakness in his extremities.  Denies history of migraines.  He has not taken any of his blood pressure medications this morning.    PHYSICAL EXAM  ED Triage Vitals   Temp Heart Rate Resp BP SpO2   04/18/23 1103 04/18/23 1103 04/18/23 1103 04/18/23 1129 04/18/23 1103   98.6 °F (37 °C) 68 18 (!) 228/108 100 %      Temp src Heart Rate Source Patient Position BP Location FiO2 (%)   -- 04/18/23 1129 04/18/23 1129 04/18/23 1129 --    Monitor Lying Left arm          GENERAL: Awake, alert, oriented x3.  Well-developed, well-nourished elderly male.  He is hard of hearing.  Resting comfortably in no acute distress  HENT: nares patent, NCAT, no temporal artery tenderness, no sinus tenderness  EYES: PERRL, EOMI  CV: regular rhythm, normal rate  RESPIRATORY: normal effort  ABDOMEN: soft, nontender  MUSCULOSKELETAL: Neck is supple.  Extremities are nontender with full range of motion  NEURO: Speech is clear and fluent.  No aphasia.  No  facial droop.  Tongue protrudes midline.  Normal strength and light touch sensation in all extremities  PSYCH:  calm, cooperative  SKIN: warm, dry    Vital signs and nursing notes reviewed.        Plan: Obtain labs and head CT.  Patient is hypertensive and will be given his normal blood pressure medications.    Head CT is negative acute.  Labs are stable.    ED Course as of 04/18/23 1854   Tue Apr 18, 2023   1225 WBC: 6.65 []   1225 Hemoglobin(!): 10.8  Stable [WH]   1234 Creatinine(!): 1.33  Stable [WH]   1303 CT scan of the head independently interpreted by me is no intracranial hemorrhage, mild-to-moderate atrophy [MP]   1315 BP(!): 209/102 [WH]   1328 Head CT is negative acute.  Patient remains hypertensive after being given his normal doses of metoprolol, lisinopril, and Norvasc.  He will be given IV labetalol and morphine. []   1651 BP(!): 182/91  Headache has resolved.  Test results were discussed with the patient and his wife.  He will be discharged with a prescription for Norvasc.  He was encouraged to take all of his blood pressure medications as prescribed.  It was also recommended he follow-up with his PCP as soon as possible to have his blood pressure rechecked. []      ED Course User Index  [MP] Patrizia Fisher PA-C  [] Kurtis Ly MD Holland, William D, MD  04/18/23 1854

## 2023-04-18 NOTE — TELEPHONE ENCOUNTER
Wife called stating patients BP was 192/110 and 222/115 yesterday. This morning his BP was 214/110 and patient complains of headaches     Wife advised to take patient to ER to be evaluated

## 2023-04-18 NOTE — ED PROVIDER NOTES
EMERGENCY DEPARTMENT ENCOUNTER    Room Number:  18/18  Date seen:  4/18/2023  PCP: Gage Pro III, NP-C  Discussed/ obtained information from independent historians: Wife at bedside      HPI:  Chief Complaint: Headache    Context: Gera Lira is a 88 y.o. male who presents to the ED c/o headache x2 days.  Patient reports a global headache.  No preceding head injury or trauma.  Wife has been checking his blood pressure and noted it to be significantly elevated.  Patient on amlodipine, metoprolol, lisinopril.  Has reportedly been out of his amlodipine.  Wife checked blood pressure this morning and it was 224/115.  Patient denies vision change or chest pain.  No other systemic complaints at this time.  Patient denies tobacco use      External (non-ED) record review:   · Patient had stat CT head performed on 3/12/2016 for headache and blurred vision.  No acute brain abnormalities at that time      PAST MEDICAL HISTORY  Active Ambulatory Problems     Diagnosis Date Noted   • Right carotid bruit 02/07/2017   • Coronary artery disease    • Diabetes mellitus    • Dyslipidemia    • Essential hypertension    • Stenosis of right carotid artery 02/27/2017   • Hx of radiation therapy 02/02/2021   • Stage 3a chronic kidney disease 02/02/2021   • Colonic mass 02/10/2021   • Mixed conductive and sensorineural hearing loss of both ears 06/08/2021   • Erectile dysfunction 06/08/2021   • History of colon resection / unresectable colon polyp 06/21/2022   • Unspecified hyperplasia of prostate without urinary obstruction and other lower urinary tract symptoms (LUTS) 08/23/2022     Resolved Ambulatory Problems     Diagnosis Date Noted   • Rectal bleeding 12/07/2020   • Mass of colon 02/02/2021     Past Medical History:   Diagnosis Date   • Adenoma of sigmoid colon    • Angina, class III    • CKD (chronic kidney disease), stage III    • History of MI (myocardial infarction)    • History of prostate cancer    •  "Hyperlipidemia    • Hypertension    • S/P CABG (coronary artery bypass graft)          PAST SURGICAL HISTORY  Past Surgical History:   Procedure Laterality Date   • CARDIAC CATHETERIZATION     • COLON RESECTION N/A 3/4/2021    Procedure: COLON RESECTION LAPAROSCOPIC SIGMOID WITH DAVINCI ROBOT, FLEXIBLE SIGMOIDOSCOPY;  Surgeon: Darek Adams MD;  Location: [facility] MAIN OR;  Service: DaVinci;  Laterality: N/A;   • COLONOSCOPY     • COLONOSCOPY N/A 2020    Procedure: COLONOSCOPY to cecum with cold polypectomies and hot snare polypectomy with tattoo;  Surgeon: Darek Adams MD;  Location: [facility] ENDOSCOPY;  Service: General;  Laterality: N/A;  pre - rectal bleeding  post - polyps, distal sigmoid mass   • CORONARY ARTERY BYPASS GRAFT     • LUMBAR DISC SURGERY           FAMILY HISTORY  Family History   Problem Relation Age of Onset   • Coronary artery disease Father    • Hypertension Father    • Breast cancer Sister    • Cerebral aneurysm Sister    • Diabetes Sister    • No Known Problems Mother    • Malig Hyperthermia Neg Hx          SOCIAL HISTORY  Social History     Socioeconomic History   • Marital status:    Tobacco Use   • Smoking status: Former     Packs/day: 0.50     Years: 20.00     Pack years: 10.00     Types: Cigarettes     Quit date: 1970     Years since quittin.3   • Smokeless tobacco: Never   • Tobacco comments:     Caffeine - Yes   Vaping Use   • Vaping Use: Never used   Substance and Sexual Activity   • Alcohol use: Not Currently     Comment: \"very little\" on occasion   • Drug use: No   • Sexual activity: Defer         ALLERGIES  Patient has no known allergies.        REVIEW OF SYSTEMS  Review of Systems   Constitutional: Negative for chills and fever.   HENT: Negative for ear pain and sore throat.    Respiratory: Negative for cough and shortness of breath.    Cardiovascular: Negative for chest pain and palpitations.   Gastrointestinal: Negative for abdominal pain and " vomiting.   Genitourinary: Negative for dysuria and hematuria.   Musculoskeletal: Negative for arthralgias and joint swelling.   Skin: Negative for pallor and rash.   Neurological: Positive for headaches. Negative for syncope.   Psychiatric/Behavioral: Negative for confusion and hallucinations.            PHYSICAL EXAM  ED Triage Vitals [04/18/23 1103]   Temp Heart Rate Resp BP SpO2   98.6 °F (37 °C) 68 18 -- 100 %      Temp src Heart Rate Source Patient Position BP Location FiO2 (%)   -- -- -- -- --       Physical Exam  Constitutional:       General: He is not in acute distress.     Appearance: Normal appearance.   HENT:      Head: Normocephalic and atraumatic.      Nose: Nose normal.      Mouth/Throat:      Mouth: Mucous membranes are moist.   Eyes:      Extraocular Movements: Extraocular movements intact.      Conjunctiva/sclera: Conjunctivae normal.      Pupils: Pupils are equal, round, and reactive to light.   Cardiovascular:      Rate and Rhythm: Normal rate and regular rhythm.      Pulses: Normal pulses.      Heart sounds: Normal heart sounds.      Comments: Distal pulses intact  Pulmonary:      Effort: Pulmonary effort is normal.      Breath sounds: Normal breath sounds.   Abdominal:      General: There is no distension.   Musculoskeletal:         General: Normal range of motion.      Cervical back: Normal range of motion and neck supple.   Skin:     General: Skin is warm.      Capillary Refill: Capillary refill takes less than 2 seconds.   Neurological:      General: No focal deficit present.      Mental Status: He is alert and oriented to person, place, and time.   Psychiatric:         Mood and Affect: Mood normal.         Vital signs and nursing notes reviewed.          LAB RESULTS  Recent Results (from the past 24 hour(s))   Comprehensive Metabolic Panel    Collection Time: 04/18/23 11:43 AM    Specimen: Blood   Result Value Ref Range    Glucose 151 (H) 65 - 99 mg/dL    BUN 15 8 - 23 mg/dL    Creatinine  1.33 (H) 0.76 - 1.27 mg/dL    Sodium 142 136 - 145 mmol/L    Potassium 3.9 3.5 - 5.2 mmol/L    Chloride 104 98 - 107 mmol/L    CO2 27.8 22.0 - 29.0 mmol/L    Calcium 9.2 8.6 - 10.5 mg/dL    Total Protein 6.9 6.0 - 8.5 g/dL    Albumin 4.1 3.5 - 5.2 g/dL    ALT (SGPT) 16 1 - 41 U/L    AST (SGOT) 13 1 - 40 U/L    Alkaline Phosphatase 52 39 - 117 U/L    Total Bilirubin 0.4 0.0 - 1.2 mg/dL    Globulin 2.8 gm/dL    A/G Ratio 1.5 g/dL    BUN/Creatinine Ratio 11.3 7.0 - 25.0    Anion Gap 10.2 5.0 - 15.0 mmol/L    eGFR 51.4 (L) >60.0 mL/min/1.73   CBC Auto Differential    Collection Time: 04/18/23 11:43 AM    Specimen: Blood   Result Value Ref Range    WBC 6.65 3.40 - 10.80 10*3/mm3    RBC 4.30 4.14 - 5.80 10*6/mm3    Hemoglobin 10.8 (L) 13.0 - 17.7 g/dL    Hematocrit 35.3 (L) 37.5 - 51.0 %    MCV 82.1 79.0 - 97.0 fL    MCH 25.1 (L) 26.6 - 33.0 pg    MCHC 30.6 (L) 31.5 - 35.7 g/dL    RDW 12.4 12.3 - 15.4 %    RDW-SD 36.8 (L) 37.0 - 54.0 fl    MPV 11.3 6.0 - 12.0 fL    Platelets 260 140 - 450 10*3/mm3    Neutrophil % 75.9 42.7 - 76.0 %    Lymphocyte % 14.6 (L) 19.6 - 45.3 %    Monocyte % 8.3 5.0 - 12.0 %    Eosinophil % 0.6 0.3 - 6.2 %    Basophil % 0.3 0.0 - 1.5 %    Immature Grans % 0.3 0.0 - 0.5 %    Neutrophils, Absolute 5.05 1.70 - 7.00 10*3/mm3    Lymphocytes, Absolute 0.97 0.70 - 3.10 10*3/mm3    Monocytes, Absolute 0.55 0.10 - 0.90 10*3/mm3    Eosinophils, Absolute 0.04 0.00 - 0.40 10*3/mm3    Basophils, Absolute 0.02 0.00 - 0.20 10*3/mm3    Immature Grans, Absolute 0.02 0.00 - 0.05 10*3/mm3    nRBC 0.0 0.0 - 0.2 /100 WBC       Ordered the above labs and reviewed the results.        RADIOLOGY  CT Head Without Contrast    Result Date: 4/18/2023  CT HEAD WITHOUT CONTRAST  HISTORY: Headache, hypertension.  COMPARISON: CT head 03/12/2016.  FINDINGS: There is moderate diffuse atrophy. Moderate vascular calcification is noted. Areas of decreased attenuation involving the white matter of cerebral hemispheres are noted  bilaterally consistent with moderate small vessel ischemic disease. No focal area of decreased attenuation to suggest acute infarction is identified.      There is no evidence of hemorrhage or of acute infarction. Atrophy, small vessel ischemic disease and vascular calcification is noted all of which is more prominent as compared to the CT examination of 03/12/2016. Further evaluation could be performed with a MRI examination of brain as indicated.    Radiation dose reduction techniques were utilized, including automated exposure control and exposure modulation based on body size.  This report was finalized on 4/18/2023 2:49 PM by Dr. Leobardo Brown M.D.        Ordered the above noted radiological studies. Reviewed by me in PACS.              MEDICATIONS GIVEN IN ER  Medications   amLODIPine (NORVASC) tablet 10 mg (10 mg Oral Given 4/18/23 1207)   lisinopril (PRINIVIL,ZESTRIL) tablet 20 mg (20 mg Oral Given 4/18/23 1208)   metoprolol tartrate (LOPRESSOR) tablet 25 mg (25 mg Oral Given 4/18/23 1207)   sodium chloride 0.9 % bolus 500 mL (0 mL Intravenous Stopped 4/18/23 1239)   labetalol (NORMODYNE,TRANDATE) injection 10 mg (10 mg Intravenous Given 4/18/23 1524)   morphine injection 2 mg (2 mg Intravenous Given 4/18/23 1526)                   MEDICAL DECISION MAKING, PROGRESS, and CONSULTS    All labs have been independently reviewed by me.  All radiology studies have been reviewed by me and I have also reviewed the radiology report.   EKG's independently viewed and interpreted by me.  Discussion below represents my analysis of pertinent findings related to patient's condition, differential diagnosis, treatment plan and final disposition.      Additional sources:    - Chronic or social conditions impacting care: Advanced age        Orders placed during this visit:  Orders Placed This Encounter   Procedures   • CT Head Without Contrast   • Comprehensive Metabolic Panel   • CBC Auto Differential   • CBC & Differential          Additional orders considered but not ordered:  MRI of the brain    Differential diagnosis:  Hypertensive urgency, tension headache, electrolyte abnormality      Independent interpretation of labs, radiology studies, and discussions with consultants:  ED Course as of 04/18/23 2018 Tue Apr 18, 2023   1225 WBC: 6.65 []   1225 Hemoglobin(!): 10.8  Stable [WH]   1234 Creatinine(!): 1.33  Stable [WH]   1303 CT scan of the head independently interpreted by me is no intracranial hemorrhage, mild-to-moderate atrophy [MP]   1315 BP(!): 209/102 [WH]   1328 Head CT is negative acute.  Patient remains hypertensive after being given his normal doses of metoprolol, lisinopril, and Norvasc.  He will be given IV labetalol and morphine. []   1651 BP(!): 182/91  Headache has resolved.  Test results were discussed with the patient and his wife.  He will be discharged with a prescription for Norvasc.  He was encouraged to take all of his blood pressure medications as prescribed.  It was also recommended he follow-up with his PCP as soon as possible to have his blood pressure rechecked. []      ED Course User Index  [MP] Patrizia Fisher PA-C  [] Kurtis Ly MD             Patient was wearing a face mask when I entered the room and they continued to wear a mask throughout their stay in the ED.  I wore PPE, including  gloves, face mask with shield or face mask with goggles whenever I was in the room with patient.     DIAGNOSIS  Final diagnoses:   Poorly-controlled hypertension   Acute nonintractable headache, unspecified headache type           Follow Up:  Gage Pro III, NP-C  [address] [id number] [phone]    Schedule an appointment as soon as possible for a visit         RX:     Where to Get Your Medications      These medications were sent to [id number] - [facility] [address] [facility] AT [address] [place] [address] - [phone] [facility] [phone] FX   [address] [facility] [address]    Phone: [phone]   · amLODIPine 10 MG tablet        Medication List      No changes were made to your prescriptions during this visit.         Latest Documented Vital Signs:  As of 20:18 EDT  BP- 168/80 HR- 64 Temp- 98.6 °F (37 °C) O2 sat- 98%              --    Please note that portions of this were completed with a voice recognition program.       Note Disclaimer: At [facility], we believe that sharing information builds trust and better relationships. You are receiving this note because you are receiving care at [facility] or recently visited. It is possible you will see health information before a provider has talked with you about it. This kind of information can be easy to misunderstand. To help you fully understand what it means for your health, we urge you to discuss this note with your provider.           Patrizia Fisher PA-C  04/18/23 2018

## 2023-04-20 ENCOUNTER — TELEPHONE (OUTPATIENT)
Dept: INTERNAL MEDICINE | Facility: CLINIC | Age: 88
End: 2023-04-20
Payer: MEDICARE

## 2023-04-20 DIAGNOSIS — I10 ESSENTIAL HYPERTENSION: Chronic | ICD-10-CM

## 2023-04-20 RX ORDER — HYDRALAZINE HYDROCHLORIDE 25 MG/1
25 TABLET, FILM COATED ORAL 2 TIMES DAILY
Qty: 180 TABLET | Refills: 0 | Status: SHIPPED | OUTPATIENT
Start: 2023-04-20

## 2023-04-20 NOTE — TELEPHONE ENCOUNTER
I found the problem. He has not taken hydralazine. I sent a refill in and it will be ready in one hour. Ms Lira will get him started on it at 25 mg and see you tomorrow

## 2023-04-20 NOTE — TELEPHONE ENCOUNTER
Patient's wife called to report his b/p at 220/107 at 2:30 am today. The reason was that he woke up with a headache. He has already been evaluated in the ED. Please advise.    I did schedule visit for tomorrow to hold the slot. Wife was agreeable to bring him

## 2023-04-20 NOTE — TELEPHONE ENCOUNTER
I can see that gave him a script for norvasc 10 mg daily - he was already on that.   Was he out of his medication?     He is on hydralazine 25 mg twice a day.   Take 2 tablets (50 mg) twice a day - if he has already taken one this morning.  He can take one extra one now (25 mg) and then in the evening - take the 2 tablets.       Take 2 tablets in the morning as well before coming to the office.     RADHA

## 2023-04-21 ENCOUNTER — OFFICE VISIT (OUTPATIENT)
Dept: INTERNAL MEDICINE | Facility: CLINIC | Age: 88
End: 2023-04-21
Payer: MEDICARE

## 2023-04-21 VITALS
DIASTOLIC BLOOD PRESSURE: 92 MMHG | WEIGHT: 146.2 LBS | HEART RATE: 80 BPM | BODY MASS INDEX: 22.16 KG/M2 | HEIGHT: 68 IN | SYSTOLIC BLOOD PRESSURE: 144 MMHG | OXYGEN SATURATION: 100 %

## 2023-04-21 DIAGNOSIS — I25.10 CORONARY ARTERY DISEASE INVOLVING NATIVE CORONARY ARTERY OF NATIVE HEART WITHOUT ANGINA PECTORIS: Chronic | ICD-10-CM

## 2023-04-21 DIAGNOSIS — N18.31 STAGE 3A CHRONIC KIDNEY DISEASE: Chronic | ICD-10-CM

## 2023-04-21 DIAGNOSIS — I10 HYPERTENSION, ESSENTIAL: ICD-10-CM

## 2023-04-21 DIAGNOSIS — E11.9 DIABETES MELLITUS WITHOUT COMPLICATION: ICD-10-CM

## 2023-04-21 DIAGNOSIS — I10 ESSENTIAL HYPERTENSION: Primary | Chronic | ICD-10-CM

## 2023-04-21 DIAGNOSIS — E78.2 MIXED HYPERLIPIDEMIA: ICD-10-CM

## 2023-04-21 RX ORDER — AMLODIPINE BESYLATE 10 MG/1
10 TABLET ORAL DAILY
Qty: 90 TABLET | Refills: 1 | Status: SHIPPED | OUTPATIENT
Start: 2023-04-21

## 2023-04-21 NOTE — ASSESSMENT & PLAN NOTE
Hypertension is improving with treatment.  Continue current treatment regimen.  Dietary sodium restriction.  Continue current medications.  Blood pressure will be reassessed at the next regular appointment.    We discussed if his SBP does not normalize over the next week: 's - he can increase hydralazine 25 mg to three times a day.     They will let me know if not improved or worsens.

## 2023-04-21 NOTE — ASSESSMENT & PLAN NOTE
Lab Results   Component Value Date    CREATININE 1.33 (H) 04/18/2023        Avoid nephrotoxic medications - especially nsaids (like Ibuprofen, aleve)   Maintain blood pressure control  Maintain blood sugar control

## 2023-04-21 NOTE — PROGRESS NOTES
Chief Complaint  Hypertension and Headache     Subjective:      History of Present Illness {CC  Problem List  Visit  Diagnosis   Encounters  Notes  Medications  Labs  Result Review Imaging  Media :23}     Gera Lira presents to [facility] for:   hospital follow up     Hypertension: patient's blood pressure has been elevated.  He went to ER on April 18: Blood pressure was 220/108.  He complained of headache.    Glucose 151, creatinine 1.33 (improved from prior 1.39)  CT head: No evidence of hemorrhage or acute infarction.  Atrophy with small vessel ischemic disease and vascular calcification.    Per notes he had been out of his Norvasc 10 mg daily.   ER refilled Norvasc.    Other blood pressure medications metoprolol, lisinopril.  Hydralazine was also on his list but not discussed in the ER.  Has been on hydralazine 25 mg twice a day.    Yesterday his wife called reported blood pressure 220/107.  I had staff to review his medication list with him and he had not been taking hydralazine.    Today: his bp has improved and HA resolved.     His weight is stable.   Wt Readings from Last 3 Encounters:   04/21/23 66.3 kg (146 lb 3.2 oz)   04/18/23 65.8 kg (145 lb)   02/13/23 68 kg (150 lb)       His wife is in the room today and we have reviewed all medications.  He states that the pharmacy had discarded his medication?         I have reviewed patient's medical history, any new submitted information provided by patient or medical assistant and updated medical record.      Objective:      Physical Exam  Vitals reviewed.   Constitutional:       Appearance: Normal appearance. He is well-developed.   Neck:      Thyroid: No thyromegaly.   Cardiovascular:      Rate and Rhythm: Normal rate and regular rhythm.      Pulses: Normal pulses.      Heart sounds: Normal heart sounds.   Pulmonary:      Effort: Pulmonary effort is normal.      Breath sounds: Normal breath sounds.       "Comments: E/U   Musculoskeletal:      Right lower leg: No edema.      Left lower leg: No edema.   Lymphadenopathy:      Cervical: No cervical adenopathy.   Neurological:      Mental Status: He is alert and oriented to person, place, and time.   Psychiatric:         Behavior: Behavior is cooperative.        Result Review  Data Reviewed:{ Labs  Result Review  Imaging  Med Tab  Media :23}     The following data was reviewed by: Gage Pro III, NP-C on 04/21/2023  Common labs        11/1/2022    15:11 1/3/2023    15:21 4/18/2023    11:43   Common Labs   Glucose 76   102   151     BUN 19   18   15     Creatinine 1.33   1.39   1.33     Sodium 146   143   142     Potassium 4.4   4.7   3.9     Chloride 104   105   104     Calcium 8.9   9.0   9.2     Total Protein 6.8       Albumin 4.40    4.1     Total Bilirubin <0.2    0.4     Alkaline Phosphatase 57    52     AST (SGOT) 21    13     ALT (SGPT) 19    16     WBC 5.37   6.23   6.65     Hemoglobin 10.9   10.5   10.8     Hematocrit 36.1   35.2   35.3     Platelets 251   243   260     Hemoglobin A1C  6.10               Vital Signs:   /92 (BP Location: Left arm, Patient Position: Sitting, Cuff Size: Adult)   Pulse 80   Ht 172.7 cm (68\")   Wt 66.3 kg (146 lb 3.2 oz)   SpO2 100%   BMI 22.23 kg/m²         Requested Prescriptions     Signed Prescriptions Disp Refills   • amLODIPine (NORVASC) 10 MG tablet 90 tablet 1     Sig: Take 1 tablet by mouth Daily.       Routine medications provided by this office will also be refilled via pharmacy request.       Current Outpatient Medications:   •  amLODIPine (NORVASC) 10 MG tablet, Take 1 tablet by mouth Daily., Disp: 90 tablet, Rfl: 1  •  aspirin (aspirin) 81 MG EC tablet, Take 1 tablet by mouth Daily., Disp: , Rfl:   •  ezetimibe (ZETIA) 10 MG tablet, Take 1 tablet by mouth Daily., Disp: 90 tablet, Rfl: 3  •  glimepiride (AMARYL) 2 MG tablet, Take 1 tablet by mouth Every Morning Before Breakfast., Disp: 90 " tablet, Rfl: 1  •  glucose blood test strip, Test once daily, Disp: 30 each, Rfl: 12  •  hydrALAZINE (APRESOLINE) 25 MG tablet, Take 1 tablet by mouth 2 (Two) Times a Day., Disp: 180 tablet, Rfl: 0  •  linagliptin (Tradjenta) 5 MG tablet tablet, Take 1 tablet by mouth Daily., Disp: 90 tablet, Rfl: 1  •  lisinopril (PRINIVIL,ZESTRIL) 40 MG tablet, Take 1 tablet by mouth Daily., Disp: 90 tablet, Rfl: 3  •  metoprolol succinate XL (TOPROL-XL) 50 MG 24 hr tablet, Take 1 tablet by mouth Every Night., Disp: 90 tablet, Rfl: 1  •  Omega-3 Fatty Acids (FISH OIL) 1200 MG capsule capsule, Take 1 capsule by mouth Every Night. HELD FOR OR, Disp: , Rfl:   •  pitavastatin calcium (Livalo) 2 MG tablet tablet, Take 1 tablet by mouth Every Night., Disp: 90 tablet, Rfl: 1  •  tadalafil (Cialis) 20 MG tablet, Take 1 tablet by mouth Daily., Disp: 9 tablet, Rfl: 5  •  ALBUTEROL SULFATE  (90 Base) MCG/ACT inhaler, INHALE 2 PUFFS BY MOUTH EVERY 4 HOURS AS NEEDED (Patient not taking: Reported on 4/21/2023), Disp: 42.5 g, Rfl: 0     Assessment and Plan:      Assessment and Plan {CC Problem List  Visit Diagnosis  ROS  Review (Popup)  Health Maintenance  Quality  BestPractice  Medications  SmartSets  SnapShot Encounters  Media :23}     Problem List Items Addressed This Visit        Cardiac and Vasculature    Coronary artery disease (Chronic)    Overview     Continues ASA and statin          Current Assessment & Plan     No CP          Relevant Medications    amLODIPine (NORVASC) 10 MG tablet    Essential hypertension - Primary (Chronic)    Current Assessment & Plan     Hypertension is improving with treatment.  Continue current treatment regimen.  Dietary sodium restriction.  Continue current medications.  Blood pressure will be reassessed at the next regular appointment.    We discussed if his SBP does not normalize over the next week: 's - he can increase hydralazine 25 mg to three times a day.     They will let me  know if not improved or worsens.          Relevant Medications    amLODIPine (NORVASC) 10 MG tablet       Genitourinary and Reproductive     Stage 3a chronic kidney disease (Chronic)    Current Assessment & Plan     Lab Results   Component Value Date    CREATININE 1.33 (H) 04/18/2023        Avoid nephrotoxic medications - especially nsaids (like Ibuprofen, aleve)   Maintain blood pressure control  Maintain blood sugar control           Other Visit Diagnoses     Mixed hyperlipidemia        Relevant Medications    amLODIPine (NORVASC) 10 MG tablet    Diabetes mellitus without complication        Relevant Medications    amLODIPine (NORVASC) 10 MG tablet    Hypertension, essential        Relevant Medications    amLODIPine (NORVASC) 10 MG tablet          Follow Up {Instructions Charge Capture  Follow-up Communications :23}     Return if symptoms worsen or fail to improve, for Next scheduled follow up.      Patient was given instructions and counseling regarding his condition or for health maintenance advice. Please see specific information pulled into the AVS if appropriate.    Dragon disclaimer:   Much of this encounter note is an electronic transcription/translation of spoken language to printed text. The electronic translation of spoken language may permit erroneous, or at times, nonsensical words or phrases to be inadvertently transcribed; Although I have reviewed the note for such errors, some may still exist.     Additional Patient Counseling:       There are no Patient Instructions on file for this visit.

## 2023-05-10 DIAGNOSIS — E78.2 MIXED HYPERLIPIDEMIA: ICD-10-CM

## 2023-05-10 DIAGNOSIS — E11.9 DIABETES MELLITUS WITHOUT COMPLICATION: ICD-10-CM

## 2023-05-10 DIAGNOSIS — I10 HYPERTENSION, ESSENTIAL: ICD-10-CM

## 2023-05-10 RX ORDER — AMLODIPINE BESYLATE 10 MG/1
10 TABLET ORAL DAILY
Qty: 90 TABLET | Refills: 1 | OUTPATIENT
Start: 2023-05-10

## 2023-05-10 NOTE — TELEPHONE ENCOUNTER
Caller: Sonja Lira    Relationship: Emergency Contact    Best call back number: [phone]    Requested Prescriptions:   Requested Prescriptions     Pending Prescriptions Disp Refills   • amLODIPine (NORVASC) 10 MG tablet 90 tablet 1     Sig: Take 1 tablet by mouth Daily.        Pharmacy where request should be sent: [id number] [address] [facility] AT [address] [place] [address] - [phone] [facility] [phone] FX     Last office visit with prescribing clinician: 4/21/2023   Last telemedicine visit with prescribing clinician: 4/21/2023   Next office visit with prescribing clinician: 7/11/2023       Does the patient have less than a 3 day supply:  [x] Yes  [] No    Would you like a call back once the refill request has been completed: [] Yes [] No    If the office needs to give you a call back, can they leave a voicemail: [] Yes [] No    Pj Petersen Rep   05/10/23 15:26 EDT

## 2023-05-11 ENCOUNTER — NURSE TRIAGE (OUTPATIENT)
Dept: CALL CENTER | Facility: HOSPITAL | Age: 88
End: 2023-05-11
Payer: MEDICARE

## 2023-05-11 NOTE — TELEPHONE ENCOUNTER
"Patient's wife reports patient's /108 despite taking blood pressure medications this morning. Patient endorses headache but denies chest pain or weakness. Reviewed protocol with patient's wife. Advised to take patient to the ER now. Patient's wife verbalizes agreement with plan.    Reason for Disposition  • [1] Systolic BP  >= 160 OR Diastolic >= 100 AND [2] cardiac (e.g., breathing difficulty, chest pain) or neurologic symptoms (e.g., new-onset blurred or double vision, unsteady gait)    Additional Information  • Negative: Difficult to awaken or acting confused (e.g., disoriented, slurred speech)  • Negative: SEVERE difficulty breathing (e.g., struggling for each breath, speaks in single words)  • Negative: [1] Weakness of the face, arm or leg on one side of the body AND [2] new-onset  • Negative: [1] Numbness (i.e., loss of sensation) of the face, arm or leg on one side of the body AND [2] new-onset  • Negative: [1] Chest pain lasts > 5 minutes AND [2] history of heart disease (i.e., heart attack, bypass surgery, angina, angioplasty, CHF)  • Negative: [1] Chest pain AND [2] took nitrogylcerin AND [3] pain was not relieved  • Negative: Sounds like a life-threatening emergency to the triager  • Negative: Symptom is main concern (e.g., headache, chest pain)  • Negative: Low blood pressure is main concern    Answer Assessment - Initial Assessment Questions  1. BLOOD PRESSURE: \"What is the blood pressure?\" \"Did you take at least two measurements 5 minutes apart?\"      227/108  2. ONSET: \"When did you take your blood pressure?\"      This morning  3. HOW: \"How did you take your blood pressure?\" (e.g., automatic home BP monitor, visiting nurse)      Home BP monitor  4. HISTORY: \"Do you have a history of high blood pressure?\"      Yes   5. MEDICINES: \"Are you taking any medicines for blood pressure?\" \"Have you missed any doses recently?\"      Yes, no missed doses  6. OTHER SYMPTOMS: \"Do you have any symptoms?\" (e.g., " "blurred vision, chest pain, difficulty breathing, headache, weakness)      Headache, shortness of breath  7. PREGNANCY: \"Is there any chance you are pregnant?\" \"When was your last menstrual period?\"      NA    Protocols used: BLOOD PRESSURE - HIGH-ADULT-AH    "

## 2023-05-12 ENCOUNTER — TELEPHONE (OUTPATIENT)
Dept: INTERNAL MEDICINE | Facility: CLINIC | Age: 88
End: 2023-05-12
Payer: MEDICARE

## 2023-05-24 ENCOUNTER — TELEPHONE (OUTPATIENT)
Dept: INTERNAL MEDICINE | Facility: CLINIC | Age: 88
End: 2023-05-24
Payer: MEDICARE

## 2023-05-24 NOTE — TELEPHONE ENCOUNTER
Caller: ERICK    Relationship: PATIENTS DAUGHTER    Best call back number:     What is the best time to reach you:     Who are you requesting to speak with (clinical staff, provider,  specific staff member):     Do you know the name of the person who called:     What was the call regarding: PATIENTS AMERICA SUAREZ IS CALLING IN TO GET A LIST OF THE MEDICATIONS THAT THE PATIENT IS TO BE TAKING.  SHE WANTS THIS LIST FAXED TO HER AT  [phone]    Do you require a callback: YES

## 2023-05-24 NOTE — TELEPHONE ENCOUNTER
Left pt a voicemail regarding message, cannot fax med list , advised patients daughter I will mail a copy of his current medications to his home.

## 2023-08-23 ENCOUNTER — TELEPHONE (OUTPATIENT)
Dept: INTERNAL MEDICINE | Facility: CLINIC | Age: 88
End: 2023-08-23
Payer: MEDICARE

## 2023-08-23 NOTE — TELEPHONE ENCOUNTER
Radha:   I have printed his last renal note I have from 2019:   He did have renal US and results documented in note.   Please fax.     WCN

## 2023-08-23 NOTE — TELEPHONE ENCOUNTER
Javier RN Geriatric Care Manager [facility] trying to figure out if he's had a kidney ultrasound , renal ultrasound . Says they are increasing his BP Meds(Locartan) from 50mgs to 75mgs to help with his BP. Says that if you all can't call back can you just fax the renal ultrasound records over to him at [phone] .

## 2023-08-28 ENCOUNTER — OFFICE VISIT (OUTPATIENT)
Dept: INTERNAL MEDICINE | Facility: CLINIC | Age: 88
End: 2023-08-28
Payer: MEDICARE

## 2023-08-28 VITALS
HEART RATE: 62 BPM | WEIGHT: 143.6 LBS | HEIGHT: 68 IN | OXYGEN SATURATION: 99 % | SYSTOLIC BLOOD PRESSURE: 120 MMHG | BODY MASS INDEX: 21.76 KG/M2 | DIASTOLIC BLOOD PRESSURE: 71 MMHG

## 2023-08-28 DIAGNOSIS — N18.31 STAGE 3A CHRONIC KIDNEY DISEASE: Chronic | ICD-10-CM

## 2023-08-28 DIAGNOSIS — I10 ESSENTIAL HYPERTENSION: Primary | Chronic | ICD-10-CM

## 2023-08-28 PROBLEM — H35.3131 NONEXUDATIVE AGE-RELATED MACULAR DEGENERATION, BILATERAL, EARLY DRY STAGE: Status: ACTIVE | Noted: 2023-07-26

## 2023-08-28 PROBLEM — H90.2 CONDUCTIVE HEARING LOSS, UNSPECIFIED: Status: ACTIVE | Noted: 2023-07-26

## 2023-08-28 PROBLEM — R33.8 OTHER RETENTION OF URINE: Status: ACTIVE | Noted: 2023-07-26

## 2023-08-28 PROBLEM — Z98.42 HISTORY OF BILATERAL CATARACT EXTRACTION: Status: ACTIVE | Noted: 2023-07-26

## 2023-08-28 PROBLEM — E78.5 HYPERLIPIDEMIA: Status: ACTIVE | Noted: 2023-07-26

## 2023-08-28 PROBLEM — Z46.1 ENCOUNTER FOR FITTING AND ADJUSTMENT OF HEARING AID: Status: ACTIVE | Noted: 2023-07-26

## 2023-08-28 PROBLEM — Z98.41 HISTORY OF BILATERAL CATARACT EXTRACTION: Status: ACTIVE | Noted: 2023-07-26

## 2023-08-28 PROBLEM — D64.9 ANEMIA: Status: ACTIVE | Noted: 2023-07-26

## 2023-08-28 PROBLEM — L84 CORN OR CALLUS: Status: ACTIVE | Noted: 2023-07-26

## 2023-08-28 LAB
ALBUMIN SERPL-MCNC: 5 G/DL (ref 3.5–5.2)
BUN SERPL-MCNC: 24 MG/DL (ref 8–23)
BUN/CREAT SERPL: 18.3 (ref 7–25)
CALCIUM SERPL-MCNC: 9.4 MG/DL (ref 8.6–10.5)
CHLORIDE SERPL-SCNC: 103 MMOL/L (ref 98–107)
CO2 SERPL-SCNC: 29 MMOL/L (ref 22–29)
CREAT SERPL-MCNC: 1.31 MG/DL (ref 0.76–1.27)
EGFRCR SERPLBLD CKD-EPI 2021: 52.4 ML/MIN/1.73
GLUCOSE SERPL-MCNC: 196 MG/DL (ref 65–99)
PHOSPHATE SERPL-MCNC: 3.2 MG/DL (ref 2.5–4.5)
POTASSIUM SERPL-SCNC: 4.1 MMOL/L (ref 3.5–5.2)
SODIUM SERPL-SCNC: 142 MMOL/L (ref 136–145)

## 2023-08-28 RX ORDER — LOSARTAN POTASSIUM 50 MG/1
1 TABLET ORAL DAILY
COMMUNITY
Start: 2023-07-31 | End: 2023-08-28 | Stop reason: SDUPTHER

## 2023-08-28 RX ORDER — LOSARTAN POTASSIUM 50 MG/1
75 TABLET ORAL DAILY
Qty: 45 TABLET | Refills: 5 | Status: SHIPPED | OUTPATIENT
Start: 2023-08-28

## 2023-08-28 NOTE — ASSESSMENT & PLAN NOTE
Lab Results   Component Value Date    CREATININE 1.38 (H) 07/11/2023        Avoid nephrotoxic medications - especially nsaids (like Ibuprofen, aleve)   Maintain blood pressure control  Maintain blood sugar control

## 2023-08-28 NOTE — PROGRESS NOTES
"        Chief Complaint  Hypertension     Subjective:      History of Present Illness {CC  Problem List  Visit  Diagnosis   Encounters  Notes  Medications  Labs  Result Review Imaging  Media :23}     Gera Lira presents to [facility] for:      Patient with CKD    [facility] (Dandre Gonzalez MD): per note on 11/30/2018 (media) he had renal US showed \"no hydro with 10.6 and 10.9 cm kidneys\".     Looking at renal notes: cr up to 1.5 March 2012  Last cr in this office 1.38 on 7/11/2023    [facility] office.  States BP was elevated and they increased losartan to 75 mg. BP controlled today.   Requested US report.    Note from nephrology was faxed.     Diabetes: improved   LV stopped amaryl.  Continues trajenta.     Had memorty testing: vascular dementia    Today spoke with wife in room and daughter on video: patient agreed.     Patient also followed by urology: hx straight cath  States at times feels difficultly voiding but voiding routinely. Hx radiation treatment.  Advised follow up with urology.       I have reviewed patient's medical history, any new submitted information provided by patient or medical assistant and updated medical record.      Objective:      Physical Exam  Cardiovascular:      Rate and Rhythm: Normal rate.      Pulses: Normal pulses.      Heart sounds: Normal heart sounds.   Pulmonary:      Effort: Pulmonary effort is normal.      Breath sounds: Normal breath sounds.      Comments: No c/w   Musculoskeletal:      Right lower leg: No edema.      Left lower leg: No edema.      Result Review  Data Reviewed:{ Labs  Result Review  Imaging  Med Tab  Media :23}     The following data was reviewed by: Gage Pro III, NP-C on 08/28/2023  Common labs          4/18/2023    11:43 7/11/2023    00:00 8/28/2023    12:03   Common Labs   Glucose 151  91  196    BUN 15  20  " "24    Creatinine 1.33  1.38  1.31    Sodium 142  146  142    Potassium 3.9  4.3  4.1    Chloride 104  106  103    Calcium 9.2  9.4  9.4    Total Protein  7.1     Albumin 4.1  4.8  5.0    Total Bilirubin 0.4  0.3     Alkaline Phosphatase 52  61     AST (SGOT) 13  18     ALT (SGPT) 16  18     WBC 6.65  6.8     Hemoglobin 10.8  10.7     Hematocrit 35.3  34.5     Platelets 260  260     Total Cholesterol  145     Triglycerides  73     HDL Cholesterol  72     LDL Cholesterol   59     Hemoglobin A1C  5.5     Microalbumin, Urine  CANCELED              Vital Signs:   /71 (BP Location: Left arm, Patient Position: Sitting, Cuff Size: Adult)   Pulse 62   Ht 172.7 cm (68\")   Wt 65.1 kg (143 lb 9.6 oz)   SpO2 99%   BMI 21.83 kg/mý         Requested Prescriptions     Signed Prescriptions Disp Refills    losartan (COZAAR) 50 MG tablet 45 tablet 5     Sig: Take 1.5 tablets by mouth Daily.       Routine medications provided by this office will also be refilled via pharmacy request.       Current Outpatient Medications:     ALBUTEROL SULFATE  (90 Base) MCG/ACT inhaler, INHALE 2 PUFFS BY MOUTH EVERY 4 HOURS AS NEEDED, Disp: 42.5 g, Rfl: 0    amLODIPine (NORVASC) 10 MG tablet, Take 1 tablet by mouth Daily., Disp: 90 tablet, Rfl: 1    aspirin (aspirin) 81 MG EC tablet, Take 1 tablet by mouth Daily., Disp: , Rfl:     ezetimibe (ZETIA) 10 MG tablet, Take 1 tablet by mouth Daily., Disp: 90 tablet, Rfl: 3    glucose blood test strip, Test once daily, Disp: 30 each, Rfl: 12    hydrALAZINE (APRESOLINE) 25 MG tablet, Take 1 tablet by mouth 2 (Two) Times a Day., Disp: 180 tablet, Rfl: 0    linagliptin (Tradjenta) 5 MG tablet tablet, Take 1 tablet by mouth Daily., Disp: 90 tablet, Rfl: 1    losartan (COZAAR) 50 MG tablet, Take 1.5 tablets by mouth Daily., Disp: 45 tablet, Rfl: 5    metoprolol succinate XL (TOPROL-XL) 50 MG 24 hr tablet, Take 1 tablet by mouth Every Night., Disp: 90 tablet, Rfl: 1    Omega-3 Fatty Acids (FISH " OIL) 1200 MG capsule capsule, Take 1 capsule by mouth Every Night. HELD FOR OR, Disp: , Rfl:     pitavastatin calcium (Livalo) 2 MG tablet tablet, Take 1 tablet by mouth Every Night., Disp: 90 tablet, Rfl: 1    tadalafil (Cialis) 20 MG tablet, Take 1 tablet by mouth Daily., Disp: 9 tablet, Rfl: 5     Assessment and Plan:      Assessment and Plan {CC Problem List  Visit Diagnosis  ROS  Review (Popup)  Health Maintenance  Quality  BestPractice  Medications  SmartSets  SnapShot Encounters  Media :23}     Problem List Items Addressed This Visit          Cardiac and Vasculature    Essential hypertension - Primary (Chronic)    Current Assessment & Plan     Hypertension is improving with treatment.  Continue current treatment regimen.  Continue current medications.  Blood pressure will be reassessed at the next regular appointment.           Relevant Medications    losartan (COZAAR) 50 MG tablet       Genitourinary and Reproductive     Stage 3a chronic kidney disease (Chronic)    Current Assessment & Plan     Lab Results   Component Value Date    CREATININE 1.38 (H) 07/11/2023        Avoid nephrotoxic medications - especially nsaids (like Ibuprofen, aleve)   Maintain blood pressure control  Maintain blood sugar control             Relevant Orders    Renal Function Panel (Completed)       Patient has chronic ckd: evaluated by nephrology in the past.   His cr has been stable.  Will recheck today.   [facility] (if higher than his baseline [facility]) could have been due to urinary retention.  Advised follow up with renal.     Will notify them of lab findings today.     Follow Up {Instructions Charge Capture  Follow-up Communications :23}     Return for Next scheduled follow up.      Patient was given instructions and counseling regarding his condition or for health maintenance advice. Please see specific information pulled into the AVS if appropriate.    Lilian disclaimer:   Much of this encounter note  is an electronic transcription/translation of spoken language to printed text. The electronic translation of spoken language may permit erroneous, or at times, nonsensical words or phrases to be inadvertently transcribed; Although I have reviewed the note for such errors, some may still exist.     Additional Patient Counseling:       There are no Patient Instructions on file for this visit.

## 2023-08-29 NOTE — PROGRESS NOTES
Please call and notify  Mr. Lira,  (speak with his wife)     Recent lab work is normal: kidney function has improved since he was last here.   He does need to increase his water intake.     Continue the current medications we discussed yesterday.     RADHA

## 2023-08-30 ENCOUNTER — TELEPHONE (OUTPATIENT)
Dept: INTERNAL MEDICINE | Facility: CLINIC | Age: 88
End: 2023-08-30
Payer: MEDICARE

## 2023-08-30 NOTE — TELEPHONE ENCOUNTER
Caller: CIERA    Relationship: [facility]    Best call back number: [phone]    What form or medical record are you requesting: COPY OF LAB RESULTS    Who is requesting this form or medical record from you: [facility]    How would you like to receive the form or medical records (pick-up, mail, fax): FAX  If fax, what is the fax number: [phone]    Timeframe paperwork needed: AS SOON AS AVAILABLE    Additional notes: CIERA STATES THAT THEY HAVE THE PATIENT SCHEDULED FOR [facility]S WITH [facility] LATER THIS MONTH, BUT NOTED THAT HE HAD A RECENT APPOINTMENT WITH THE OFFICE. REQUESTING A COPY OF ANY LAB RESULTS WITHIN THE LAST MONTH SO THEY CAN SEE IF THEY STILL NEED TO RUN THEIR LABS. CALL IF ADDITIONAL FOLLOW UP NECESSARY

## 2023-08-30 NOTE — ASSESSMENT & PLAN NOTE
Hypertension is improving with treatment.  Continue current treatment regimen.  Continue current medications.  Blood pressure will be reassessed at the next regular appointment.

## 2023-09-29 DIAGNOSIS — I10 ESSENTIAL HYPERTENSION: Chronic | ICD-10-CM

## 2023-09-29 RX ORDER — LOSARTAN POTASSIUM 50 MG/1
75 TABLET ORAL DAILY
Qty: 45 TABLET | Refills: 5 | Status: SHIPPED | OUTPATIENT
Start: 2023-09-29

## 2023-09-29 RX ORDER — METOPROLOL SUCCINATE 50 MG/1
50 TABLET, EXTENDED RELEASE ORAL NIGHTLY
Qty: 90 TABLET | Refills: 1 | Status: SHIPPED | OUTPATIENT
Start: 2023-09-29

## 2023-10-06 NOTE — TELEPHONE ENCOUNTER
??    
Define what this is  Suspect it appropriate  
NEEDS AN INCONTINENT CLAMP SENT TO [facility] HE NEEDS A PRESCRIPTION FOR IT, THEY TOLD HIM... THE FAX  [phone]  
Please advise Dr. Burrows out until next week.  
Please advise ok to do  
I have a hernia

## 2023-10-18 ENCOUNTER — DOCUMENTATION (OUTPATIENT)
Dept: PHYSICAL THERAPY | Facility: CLINIC | Age: 88
End: 2023-10-18
Payer: MEDICARE

## 2023-10-18 DIAGNOSIS — Z91.81 PERSONAL HISTORY OF FALL: ICD-10-CM

## 2023-10-18 DIAGNOSIS — R26.89 IMBALANCE: Primary | ICD-10-CM

## 2023-10-18 NOTE — PROGRESS NOTES
Discharge Summary  Discharge Summary from Physical Therapy Report  [address], [address], [place] [address]    Patient Information  Gera Lira  1935    Dates  PT visit: 12/01/2022 - 12/22/2022  Number of Visits: 7     Discharge Status of Patient: See final note dated 12/22/2022    Goals: Partially Met    Visit Diagnoses:    ICD-10-CM ICD-9-CM   1. Imbalance  R26.89 781.2   2. Personal history of fall  Z91.81 V15.88       Discharge Plan: Continue with current home exercise program as instructed    Date of Discharge 10/18/2023        Letitia Zimmerman PT, DPT, OCS  Physical Therapist  [place] #[id number]  Electronically Signed by: Letitia Zimmerman PT, 10/18/23, 2:49 PM EDT

## 2023-10-20 DIAGNOSIS — I10 HYPERTENSION, ESSENTIAL: ICD-10-CM

## 2023-10-20 DIAGNOSIS — E11.9 DIABETES MELLITUS WITHOUT COMPLICATION: ICD-10-CM

## 2023-10-20 DIAGNOSIS — R35.0 URINE FREQUENCY: ICD-10-CM

## 2023-10-20 DIAGNOSIS — E78.2 MIXED HYPERLIPIDEMIA: ICD-10-CM

## 2023-10-20 RX ORDER — LINAGLIPTIN 5 MG/1
5 TABLET, FILM COATED ORAL DAILY
Qty: 90 TABLET | Refills: 0 | Status: SHIPPED | OUTPATIENT
Start: 2023-10-20

## 2023-11-03 DIAGNOSIS — I10 ESSENTIAL HYPERTENSION: Chronic | ICD-10-CM

## 2023-11-03 RX ORDER — HYDRALAZINE HYDROCHLORIDE 25 MG/1
25 TABLET, FILM COATED ORAL 2 TIMES DAILY
Qty: 180 TABLET | Refills: 0 | Status: SHIPPED | OUTPATIENT
Start: 2023-11-03

## 2024-01-06 DIAGNOSIS — N52.9 ERECTILE DYSFUNCTION, UNSPECIFIED ERECTILE DYSFUNCTION TYPE: ICD-10-CM

## 2024-01-08 RX ORDER — TADALAFIL 20 MG/1
20 TABLET ORAL DAILY
Qty: 9 TABLET | Refills: 5 | Status: SHIPPED | OUTPATIENT
Start: 2024-01-08

## 2024-01-16 ENCOUNTER — OFFICE VISIT (OUTPATIENT)
Dept: INTERNAL MEDICINE | Facility: CLINIC | Age: 89
End: 2024-01-16
Payer: MEDICARE

## 2024-01-16 ENCOUNTER — HOSPITAL ENCOUNTER (OUTPATIENT)
Facility: HOSPITAL | Age: 89
Discharge: HOME | End: 2024-01-16
Admitting: NURSE PRACTITIONER
Payer: MEDICARE

## 2024-01-16 ENCOUNTER — TELEPHONE (OUTPATIENT)
Dept: INTERNAL MEDICINE | Facility: CLINIC | Age: 89
End: 2024-01-16

## 2024-01-16 VITALS
BODY MASS INDEX: 22.37 KG/M2 | SYSTOLIC BLOOD PRESSURE: 126 MMHG | HEIGHT: 68 IN | DIASTOLIC BLOOD PRESSURE: 80 MMHG | WEIGHT: 147.6 LBS

## 2024-01-16 DIAGNOSIS — I25.10 CORONARY ARTERY DISEASE INVOLVING NATIVE CORONARY ARTERY OF NATIVE HEART WITHOUT ANGINA PECTORIS: Chronic | ICD-10-CM

## 2024-01-16 DIAGNOSIS — R05.1 ACUTE COUGH: ICD-10-CM

## 2024-01-16 DIAGNOSIS — R06.09 DOE (DYSPNEA ON EXERTION): ICD-10-CM

## 2024-01-16 DIAGNOSIS — E11.59 TYPE 2 DIABETES MELLITUS WITH OTHER CIRCULATORY COMPLICATION, WITHOUT LONG-TERM CURRENT USE OF INSULIN: Chronic | ICD-10-CM

## 2024-01-16 DIAGNOSIS — I10 ESSENTIAL HYPERTENSION: Primary | Chronic | ICD-10-CM

## 2024-01-16 LAB
BUN SERPL-MCNC: 17 MG/DL (ref 8–23)
BUN/CREAT SERPL: 11.6 (ref 7–25)
CALCIUM SERPL-MCNC: 8.9 MG/DL (ref 8.6–10.5)
CHLORIDE SERPL-SCNC: 104 MMOL/L (ref 98–107)
CO2 SERPL-SCNC: 27.7 MMOL/L (ref 22–29)
CREAT SERPL-MCNC: 1.46 MG/DL (ref 0.76–1.27)
EGFRCR SERPLBLD CKD-EPI 2021: 46 ML/MIN/1.73
ERYTHROCYTE [DISTWIDTH] IN BLOOD BY AUTOMATED COUNT: 12.6 % (ref 12.3–15.4)
GLUCOSE SERPL-MCNC: 200 MG/DL (ref 65–99)
HBA1C MFR BLD: 8.1 % (ref 4.8–5.6)
HCT VFR BLD AUTO: 34.6 % (ref 37.5–51)
HGB BLD-MCNC: 10.5 G/DL (ref 13–17.7)
MCH RBC QN AUTO: 24.8 PG (ref 26.6–33)
MCHC RBC AUTO-ENTMCNC: 30.3 G/DL (ref 31.5–35.7)
MCV RBC AUTO: 81.8 FL (ref 79–97)
PLATELET # BLD AUTO: 283 10*3/MM3 (ref 140–450)
POTASSIUM SERPL-SCNC: 4.3 MMOL/L (ref 3.5–5.2)
RBC # BLD AUTO: 4.23 10*6/MM3 (ref 4.14–5.8)
SODIUM SERPL-SCNC: 143 MMOL/L (ref 136–145)
WBC # BLD AUTO: 7.37 10*3/MM3 (ref 3.4–10.8)

## 2024-01-16 PROCEDURE — 71046 X-RAY EXAM CHEST 2 VIEWS: CPT

## 2024-01-16 PROCEDURE — 1160F RVW MEDS BY RX/DR IN RCRD: CPT | Performed by: NURSE PRACTITIONER

## 2024-01-16 PROCEDURE — 99214 OFFICE O/P EST MOD 30 MIN: CPT | Performed by: NURSE PRACTITIONER

## 2024-01-16 PROCEDURE — 1159F MED LIST DOCD IN RCRD: CPT | Performed by: NURSE PRACTITIONER

## 2024-01-16 NOTE — PROGRESS NOTES
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      LV: note reviewed.   Progress Notes by Gage Pro III, NP-C (08/28/2023 11:00 AM)     His wife is here with him today.     Diabetes: controlled, have stopped amaryl    Hypertension: chronic: last year he went to cardiology and medications adjusted.  Can't see that he has follow up scheduled.     He states for the last 1-2 weeks some SOA with activity.   He was putting items onto a trailer last week with a friend. States he got fatigue and had to rest.  No CP.   No PND, no edema.     He had some cough also in the last wk: no fever or chills.  None today.     Wife states he is not active in the winter. \"No activity\"   Yesterday: he stayed in the bed due to temp.     [phone]  For glasses, hearing aid       I have reviewed patient's medical history, any new submitted information provided by patient or medical assistant and updated medical record.      Objective:      Physical Exam  HENT:      Ears:      Comments: Hearing aids   Cardiovascular:      Rate and Rhythm: Normal rate and regular rhythm.      Pulses: Normal pulses.      Heart sounds: Normal heart sounds.   Pulmonary:      Effort: Pulmonary effort is normal.      Breath sounds: Normal breath sounds.   Abdominal:      General: Bowel sounds are normal. " "  Musculoskeletal:      Right lower leg: No edema.      Left lower leg: No edema.        Result Review  Data Reviewed:{ Labs  Result Review  Imaging  Med Tab  Media :23}     The following data was reviewed by: Gage Pro III, NP-C on 01/16/2024  Common labs          7/11/2023    00:00 8/28/2023    12:03 1/16/2024    11:15   Common Labs   Glucose 91  196  200    BUN 20  24  17    Creatinine 1.38  1.31  1.46    Sodium 146  142  143    Potassium 4.3  4.1  4.3    Chloride 106  103  104    Calcium 9.4  9.4  8.9    Total Protein 7.1      Albumin 4.8  5.0     Total Bilirubin 0.3      Alkaline Phosphatase 61      AST (SGOT) 18      ALT (SGPT) 18      WBC 6.8   7.37    Hemoglobin 10.7   10.5    Hematocrit 34.5   34.6    Platelets 260   283    Total Cholesterol 145      Triglycerides 73      HDL Cholesterol 72      LDL Cholesterol  59      Hemoglobin A1C 5.5   8.10    Microalbumin, Urine CANCELED               Vital Signs:   /80 (BP Location: Left arm, Patient Position: Sitting, Cuff Size: Adult)   Ht 172.7 cm (68\")   Wt 67 kg (147 lb 9.6 oz)   BMI 22.44 kg/m²         Requested Prescriptions      No prescriptions requested or ordered in this encounter       Routine medications provided by this office will also be refilled via pharmacy request.       Current Outpatient Medications:     amLODIPine (NORVASC) 10 MG tablet, Take 1 tablet by mouth Daily., Disp: 90 tablet, Rfl: 1    aspirin (aspirin) 81 MG EC tablet, Take 1 tablet by mouth Daily., Disp: , Rfl:     ezetimibe (ZETIA) 10 MG tablet, Take 1 tablet by mouth Daily., Disp: 90 tablet, Rfl: 3    hydrALAZINE (APRESOLINE) 25 MG tablet, TAKE 1 TABLET BY MOUTH TWICE DAILY, Disp: 180 tablet, Rfl: 0    linagliptin (Tradjenta) 5 MG tablet tablet, TAKE 1 TABLET BY MOUTH DAILY, Disp: 90 tablet, Rfl: 0    losartan (COZAAR) 50 MG tablet, Take 1.5 tablets by mouth Daily., Disp: 45 tablet, Rfl: 5    metoprolol succinate XL (TOPROL-XL) 50 MG 24 hr tablet, Take " 1 tablet by mouth Every Night., Disp: 90 tablet, Rfl: 1    Omega-3 Fatty Acids (FISH OIL) 1200 MG capsule capsule, Take 1 capsule by mouth Every Night. HELD FOR OR, Disp: , Rfl:     pitavastatin calcium (Livalo) 2 MG tablet tablet, Take 1 tablet by mouth Every Night., Disp: 90 tablet, Rfl: 1    ALBUTEROL SULFATE  (90 Base) MCG/ACT inhaler, INHALE 2 PUFFS BY MOUTH EVERY 4 HOURS AS NEEDED (Patient not taking: Reported on 1/16/2024), Disp: 42.5 g, Rfl: 0     Assessment and Plan:      Assessment and Plan {CC Problem List  Visit Diagnosis  ROS  Review (Popup)  Health Maintenance  Quality  BestPractice  Medications  SmartSets  SnapShot Encounters  Media :23}     Problem List Items Addressed This Visit          Cardiac and Vasculature    Coronary artery disease (Chronic)    Overview     Continues ASA and statin          Essential hypertension - Primary (Chronic)    Current Assessment & Plan     Hypertension is improving with treatment.  Continue current treatment regimen.  Continue current medications.  Blood pressure will be reassessed at the next regular appointment.    He is due for follow up with cardiology.  He hasn't been seen since 2/2023 when medications for hypertension was adjusted.            Relevant Orders    Basic Metabolic Panel (Completed)    CBC (No Diff) (Completed)       Endocrine and Metabolic    Diabetes mellitus (Chronic)    Current Assessment & Plan     Your last A1C (3 month average) =   Lab Results   Component Value Date    HGBA1C 5.5 07/11/2023      Your diabetes is Controlled.    The American Diabetes Association recommends an A1C of less than 7%.  A1C Average Levels Blood Sugar:   6%  126 mg/dL  7%  154 mg/dL  8%  183 mg/dL  9%  212 mg/dL  10%  240 mg/dL  11%  269 mg/dL  12%  298 mg/dL    Glucose goals for many adults with diabetes  Blood sugar before meals  mg/dL  Blood sugar 1-2 hours after the start of a meal Less than 180 mg/dL A1C Less than 7%    Eye Health:   You  need a diabetic eye exam yearly.   Please have a copy of the note faxed to my office.   Fax: [phone]    Foot Health:   You need a diabetic foot exam yearly.   Check your feet routinely for any wounds.   You should always check your shoes for any debris that could cause a wound.             Relevant Orders    Basic Metabolic Panel (Completed)    Hemoglobin A1c (Completed)    CBC (No Diff) (Completed)     Other Visit Diagnoses       PRADHAN (dyspnea on exertion)        Relevant Orders    XR Chest PA & Lateral (Completed)    Acute cough        Relevant Orders    XR Chest PA & Lateral (Completed)            It seems like his PRADHAN is really more deconditioning as he has not been active in the last few weeks due to temp.   Will get cxr.    Have him to follow up with cardiology.     Will get labs today.     Follow Up {Instructions Charge Capture  Follow-up Communications :23}     Return in about 6 months (around 7/16/2024) for Medicare Wellness.      Patient was given instructions and counseling regarding his condition or for health maintenance advice. Please see specific information pulled into the AVS if appropriate.    Dragon disclaimer:   Much of this encounter note is an electronic transcription/translation of spoken language to printed text. The electronic translation of spoken language may permit erroneous, or at times, nonsensical words or phrases to be inadvertently transcribed; Although I have reviewed the note for such errors, some may still exist.     Additional Patient Counseling:       There are no Patient Instructions on file for this visit.

## 2024-01-16 NOTE — ASSESSMENT & PLAN NOTE
Your last A1C (3 month average) =   Lab Results   Component Value Date    HGBA1C 5.5 07/11/2023      Your diabetes is Controlled.    The American Diabetes Association recommends an A1C of less than 7%.  A1C Average Levels Blood Sugar:   6%  126 mg/dL  7%  154 mg/dL  8%  183 mg/dL  9%  212 mg/dL  10%  240 mg/dL  11%  269 mg/dL  12%  298 mg/dL    Glucose goals for many adults with diabetes  Blood sugar before meals  mg/dL  Blood sugar 1-2 hours after the start of a meal Less than 180 mg/dL A1C Less than 7%    Eye Health:   You need a diabetic eye exam yearly.   Please have a copy of the note faxed to my office.   Fax: [phone]    Foot Health:   You need a diabetic foot exam yearly.   Check your feet routinely for any wounds.   You should always check your shoes for any debris that could cause a wound.

## 2024-01-16 NOTE — ASSESSMENT & PLAN NOTE
Hypertension is improving with treatment.  Continue current treatment regimen.  Continue current medications.  Blood pressure will be reassessed at the next regular appointment.    He is due for follow up with cardiology.  He hasn't been seen since 2/2023 when medications for hypertension was adjusted.

## 2024-01-16 NOTE — TELEPHONE ENCOUNTER
TRISHA GONZALEZ TO READ     ----- Message from BETHEL Lozano III sent at 1/16/2024 12:51 PM EST -----  Please call and let him know that his chest xray was normal.     Ensure he follows up with cardiology: ensure he has number at scheduling to call.     ANGYN

## 2024-01-18 DIAGNOSIS — E11.59 TYPE 2 DIABETES MELLITUS WITH OTHER CIRCULATORY COMPLICATION, WITHOUT LONG-TERM CURRENT USE OF INSULIN: Primary | ICD-10-CM

## 2024-01-18 RX ORDER — GLIMEPIRIDE 2 MG/1
2 TABLET ORAL
Qty: 90 TABLET | Refills: 1 | Status: SHIPPED | OUTPATIENT
Start: 2024-01-18

## 2024-01-18 NOTE — PROGRESS NOTES
Please call and notify  Mr. Lira,     Recent lab work is normal except his diabetes is much worse and may explain some of his fatigue.   His A1C went from 5.5% to 8.1%    He needs to cut out sugars/carbs if he has increased and he needs to restart his amaryl (I have sent it to his pharmacy).     ANGYN

## 2024-01-21 DIAGNOSIS — E78.2 MIXED HYPERLIPIDEMIA: ICD-10-CM

## 2024-01-21 DIAGNOSIS — R35.0 URINE FREQUENCY: ICD-10-CM

## 2024-01-21 DIAGNOSIS — E11.9 DIABETES MELLITUS WITHOUT COMPLICATION: ICD-10-CM

## 2024-01-21 DIAGNOSIS — I10 HYPERTENSION, ESSENTIAL: ICD-10-CM

## 2024-01-22 RX ORDER — AMLODIPINE BESYLATE 10 MG/1
10 TABLET ORAL DAILY
Qty: 90 TABLET | Refills: 2 | Status: SHIPPED | OUTPATIENT
Start: 2024-01-22

## 2024-01-22 RX ORDER — LINAGLIPTIN 5 MG/1
5 TABLET, FILM COATED ORAL DAILY
Qty: 90 TABLET | Refills: 2 | Status: SHIPPED | OUTPATIENT
Start: 2024-01-22

## 2024-01-30 DIAGNOSIS — E78.5 DYSLIPIDEMIA: Chronic | ICD-10-CM

## 2024-01-30 DIAGNOSIS — I25.10 CORONARY ARTERY DISEASE INVOLVING NATIVE CORONARY ARTERY OF NATIVE HEART WITHOUT ANGINA PECTORIS: Chronic | ICD-10-CM

## 2024-01-30 RX ORDER — PITAVASTATIN CALCIUM 2.09 MG/1
2 TABLET, FILM COATED ORAL NIGHTLY
Qty: 90 TABLET | Refills: 1 | Status: SHIPPED | OUTPATIENT
Start: 2024-01-30

## 2024-01-31 ENCOUNTER — TELEPHONE (OUTPATIENT)
Dept: INTERNAL MEDICINE | Facility: CLINIC | Age: 89
End: 2024-01-31
Payer: MEDICARE

## 2024-01-31 NOTE — TELEPHONE ENCOUNTER
Hub staff attempted to follow warm transfer process and was unsuccessful     Caller: Sonja Lira    Relationship to patient: Emergency Contact    Best call back number: [phone]     Patient is needing: PATIENT'S WIFE RETURNING A CALL STATING THAT OFFICE HAD NOT FOUND HIS WALLET, HOWEVER IT WAS NOT HIS WALLET THAT MISSING, BUT JUST HIS INSURANCE CARDS FOR MEDICARE AND [employer] THAT THEY THINK HE LEFT IN THE LABS.

## 2024-02-01 NOTE — TELEPHONE ENCOUNTER
I spoke to Mrs. Lira and let her know that I will call her if the cards are found, but no one has seen them in our office.

## 2024-02-07 DIAGNOSIS — I10 ESSENTIAL HYPERTENSION: Chronic | ICD-10-CM

## 2024-02-07 RX ORDER — HYDRALAZINE HYDROCHLORIDE 25 MG/1
25 TABLET, FILM COATED ORAL 2 TIMES DAILY
Qty: 180 TABLET | Refills: 1 | Status: SHIPPED | OUTPATIENT
Start: 2024-02-07

## 2024-02-26 ENCOUNTER — OFFICE VISIT (OUTPATIENT)
Dept: INTERNAL MEDICINE | Facility: CLINIC | Age: 89
End: 2024-02-26
Payer: MEDICARE

## 2024-02-26 VITALS
DIASTOLIC BLOOD PRESSURE: 78 MMHG | HEIGHT: 68 IN | OXYGEN SATURATION: 99 % | WEIGHT: 149.2 LBS | BODY MASS INDEX: 22.61 KG/M2 | SYSTOLIC BLOOD PRESSURE: 122 MMHG | HEART RATE: 67 BPM

## 2024-02-26 DIAGNOSIS — I10 ESSENTIAL HYPERTENSION: Chronic | ICD-10-CM

## 2024-02-26 DIAGNOSIS — N39.9 URINARY PROBLEM IN MALE: Primary | ICD-10-CM

## 2024-02-26 DIAGNOSIS — E11.59 TYPE 2 DIABETES MELLITUS WITH OTHER CIRCULATORY COMPLICATION, WITHOUT LONG-TERM CURRENT USE OF INSULIN: Chronic | ICD-10-CM

## 2024-02-26 DIAGNOSIS — R82.90 ABNORMAL URINE FINDINGS: ICD-10-CM

## 2024-02-26 DIAGNOSIS — E11.9 DIABETES MELLITUS WITHOUT COMPLICATION: ICD-10-CM

## 2024-02-26 LAB
BACTERIA UR QL AUTO: ABNORMAL /HPF
BILIRUB BLD-MCNC: NEGATIVE MG/DL
BUN SERPL-MCNC: 17 MG/DL (ref 8–23)
BUN/CREAT SERPL: 12.6 (ref 7–25)
CALCIUM SERPL-MCNC: 8.9 MG/DL (ref 8.6–10.5)
CHLORIDE SERPL-SCNC: 104 MMOL/L (ref 98–107)
CLARITY, POC: CLEAR
CO2 SERPL-SCNC: 26.3 MMOL/L (ref 22–29)
COLOR UR: YELLOW
CREAT SERPL-MCNC: 1.35 MG/DL (ref 0.76–1.27)
EGFRCR SERPLBLD CKD-EPI 2021: 50.5 ML/MIN/1.73
ERYTHROCYTE [DISTWIDTH] IN BLOOD BY AUTOMATED COUNT: 12.5 % (ref 12.3–15.4)
EXPIRATION DATE: ABNORMAL
GLUCOSE SERPL-MCNC: 123 MG/DL (ref 65–99)
GLUCOSE UR STRIP-MCNC: NEGATIVE MG/DL
HBA1C MFR BLD: 6.4 % (ref 4.8–5.6)
HCT VFR BLD AUTO: 34.9 % (ref 37.5–51)
HGB BLD-MCNC: 10.5 G/DL (ref 13–17.7)
KETONES UR QL: NEGATIVE
LEUKOCYTE EST, POC: NEGATIVE
Lab: ABNORMAL
MCH RBC QN AUTO: 24.6 PG (ref 26.6–33)
MCHC RBC AUTO-ENTMCNC: 30.1 G/DL (ref 31.5–35.7)
MCV RBC AUTO: 81.9 FL (ref 79–97)
MUCUS, POC: ABNORMAL
NITRITE UR-MCNC: NEGATIVE MG/ML
PH UR: 6.5 [PH] (ref 5–8)
PLATELET # BLD AUTO: 275 10*3/MM3 (ref 140–450)
POTASSIUM SERPL-SCNC: 4.3 MMOL/L (ref 3.5–5.2)
PROT UR STRIP-MCNC: ABNORMAL MG/DL
RBC # BLD AUTO: 4.26 10*6/MM3 (ref 4.14–5.8)
RBC # UR STRIP: ABNORMAL /HPF
RBC # UR STRIP: NEGATIVE /UL
SODIUM SERPL-SCNC: 142 MMOL/L (ref 136–145)
SP GR UR: 1.02 (ref 1–1.03)
SQUAMOUS EPITHELIAL, POC: ABNORMAL
UROBILINOGEN UR QL: ABNORMAL
WBC # BLD AUTO: 6.81 10*3/MM3 (ref 3.4–10.8)
WBC # UR STRIP: ABNORMAL /HPF

## 2024-02-26 PROCEDURE — 81001 URINALYSIS AUTO W/SCOPE: CPT | Performed by: NURSE PRACTITIONER

## 2024-02-26 NOTE — PROGRESS NOTES
Chief Complaint  Urinary Tract Infection (Patient had a scope done and thinks he has a infection. Says it looked like plastic and urine has a odor to it. ) and Diabetes     Subjective:      History of Present Illness {CC  Problem List  Visit  Diagnosis   Encounters  Notes  Medications  Labs  Result Review Imaging  Media :23}     Gera Lira presents to [facility] for:        Diabetes: chronic   LV: A1C worsened  He was on tradjenta and states compliant.    Added amaryl - states tolerating well.   Will get lab work rechecked today.     Prostate cancer   1/31/24: urology: cytso   Findings:  Bladder neck: normal and patent  Bladder: normal bladder without suspicious abnormalities  Ureteral Orifices:   - Right: normal caliber, orthotopic  - Left: normal caliber, orthotopic  Prostate: no enlargement, no abnormalities  Urethra: meatal stenosis, able to pass scope with significant amount of force, slight 18-Fr soft stricture in bulbar urethra, otherwise, urethra looks okay     Today: he states since then he states urine has strong odor.   States drinks coffee but not a lot of water. No fever or chills. No flank pain.   Wears depends     Hypertension: continues BB, ARB, hydralazine, norvasc  No CP, SOA     I have reviewed patient's medical history, any new submitted information provided by patient or medical assistant and updated medical record.      Objective:      Physical Exam  Cardiovascular:      Rate and Rhythm: Normal rate.      Pulses: Normal pulses.      Heart sounds: Normal heart sounds.   Pulmonary:      Effort: Pulmonary effort is normal.   Abdominal:      Tenderness: There is no right CVA tenderness or left CVA tenderness.   Neurological:      Mental Status: He is oriented to person, place, and time.        Result Review  Data Reviewed:{ Labs  Result Review  Imaging  Med Tab  Media :23}     The following data was reviewed by: Gage Pro  "III, NP-C on 02/26/2024  Common labs          7/11/2023    00:00 8/28/2023    12:03 1/16/2024    11:15   Common Labs   Glucose 91  196  200    BUN 20  24  17    Creatinine 1.38  1.31  1.46    Sodium 146  142  143    Potassium 4.3  4.1  4.3    Chloride 106  103  104    Calcium 9.4  9.4  8.9    Total Protein 7.1      Albumin 4.8  5.0     Total Bilirubin 0.3      Alkaline Phosphatase 61      AST (SGOT) 18      ALT (SGPT) 18      WBC 6.8   7.37    Hemoglobin 10.7   10.5    Hematocrit 34.5   34.6    Platelets 260   283    Total Cholesterol 145      Triglycerides 73      HDL Cholesterol 72      LDL Cholesterol  59      Hemoglobin A1C 5.5   8.10    Microalbumin, Urine CANCELED        URINE DIPSTICK:  Lab Results - Last 18 Months   Lab Units 02/26/24  1000   COLOR UA  Yellow   CLARITY UA  Clear   SPECIFIC GRAVITY, URINE  1.020   PH, URINE  6.5   LEUKOCYTES UA  Negative   NITRITE UA  Negative   PROTEIN UA mg/dL 2+*   UROBILINOGEN UA  0.2 E.U./dL   BILIRUBIN UA  Negative   BLOOD UA  Negative     POC Micro Urine (02/26/2024 10:00 AM)   WBC 11-20 hpf          Vital Signs:   /78 (BP Location: Left arm, Patient Position: Sitting, Cuff Size: Adult)   Pulse 67   Ht 172.7 cm (68\")   Wt 67.7 kg (149 lb 3.2 oz)   SpO2 99%   BMI 22.69 kg/m²         Requested Prescriptions      No prescriptions requested or ordered in this encounter       Routine medications provided by this office will also be refilled via pharmacy request.       Current Outpatient Medications:     amLODIPine (NORVASC) 10 MG tablet, TAKE 1 TABLET BY MOUTH DAILY, Disp: 90 tablet, Rfl: 2    aspirin (aspirin) 81 MG EC tablet, Take 1 tablet by mouth Daily., Disp: , Rfl:     ezetimibe (ZETIA) 10 MG tablet, Take 1 tablet by mouth Daily., Disp: 90 tablet, Rfl: 3    glimepiride (AMARYL) 2 MG tablet, Take 1 tablet by mouth Every Morning Before Breakfast., Disp: 90 tablet, Rfl: 1    hydrALAZINE (APRESOLINE) 25 MG tablet, TAKE 1 TABLET BY MOUTH TWICE DAILY, Disp: 180 " tablet, Rfl: 1    linagliptin (Tradjenta) 5 MG tablet tablet, TAKE 1 TABLET BY MOUTH DAILY, Disp: 90 tablet, Rfl: 2    Livalo 2 MG tablet tablet, TAKE 1 TABLET BY MOUTH EVERY NIGHT, Disp: 90 tablet, Rfl: 1    losartan (COZAAR) 50 MG tablet, Take 1.5 tablets by mouth Daily., Disp: 45 tablet, Rfl: 5    metoprolol succinate XL (TOPROL-XL) 50 MG 24 hr tablet, Take 1 tablet by mouth Every Night., Disp: 90 tablet, Rfl: 1    Omega-3 Fatty Acids (FISH OIL) 1200 MG capsule capsule, Take 1 capsule by mouth Every Night. HELD FOR OR, Disp: , Rfl:     ALBUTEROL SULFATE  (90 Base) MCG/ACT inhaler, INHALE 2 PUFFS BY MOUTH EVERY 4 HOURS AS NEEDED (Patient not taking: Reported on 2/26/2024), Disp: 42.5 g, Rfl: 0     Assessment and Plan:      Assessment and Plan {CC Problem List  Visit Diagnosis  ROS  Review (Popup)  Health Maintenance  Quality  BestPractice  Medications  SmartSets  SnapShot Encounters  Media :23}     Problem List Items Addressed This Visit          Cardiac and Vasculature    Essential hypertension (Chronic)    Current Assessment & Plan     Hypertension is improving with treatment.  Continue current treatment regimen.  Continue current medications.  Blood pressure will be reassessed at the next regular appointment.                  Endocrine and Metabolic    Diabetes mellitus (Chronic)    Current Assessment & Plan     Your last A1C (3 month average) =   Lab Results   Component Value Date    HGBA1C 8.10 (H) 01/16/2024      Your diabetes is Uncontrolled.  Since last visit - started amaryl     The American Diabetes Association recommends an A1C of less than 7%.  A1C Average Levels Blood Sugar:   6%  126 mg/dL  7%  154 mg/dL  8%  183 mg/dL  9%  212 mg/dL  10%  240 mg/dL  11%  269 mg/dL  12%  298 mg/dL    Glucose goals for many adults with diabetes  Blood sugar before meals  mg/dL  Blood sugar 1-2 hours after the start of a meal Less than 180 mg/dL A1C Less than 7%    Eye Health:   You need a  diabetic eye exam yearly.   Please have a copy of the note faxed to my office.   Fax: [phone]    Foot Health:   You need a diabetic foot exam yearly.   Check your feet routinely for any wounds.   You should always check your shoes for any debris that could cause a wound.              Other Visit Diagnoses       Urinary problem in male    -  Primary    Relevant Orders    POC Urinalysis Dipstick, Automated (Completed)    CBC (No Diff)    Diabetes mellitus without complication        Relevant Orders    Hemoglobin A1c    Basic Metabolic Panel          Urine sent for culture - will notify him when results are back.       Follow Up {Instructions Charge Capture  Follow-up Communications :23}     Return for Next scheduled follow up.      Patient was given instructions and counseling regarding his condition or for health maintenance advice. Please see specific information pulled into the AVS if appropriate.    Dragon disclaimer:   Much of this encounter note is an electronic transcription/translation of spoken language to printed text. The electronic translation of spoken language may permit erroneous, or at times, nonsensical words or phrases to be inadvertently transcribed; Although I have reviewed the note for such errors, some may still exist.     Additional Patient Counseling:       There are no Patient Instructions on file for this visit.

## 2024-02-26 NOTE — ASSESSMENT & PLAN NOTE
Your last A1C (3 month average) =   Lab Results   Component Value Date    HGBA1C 8.10 (H) 01/16/2024      Your diabetes is Uncontrolled.  Since last visit - started amaryl     The American Diabetes Association recommends an A1C of less than 7%.  A1C Average Levels Blood Sugar:   6%  126 mg/dL  7%  154 mg/dL  8%  183 mg/dL  9%  212 mg/dL  10%  240 mg/dL  11%  269 mg/dL  12%  298 mg/dL    Glucose goals for many adults with diabetes  Blood sugar before meals  mg/dL  Blood sugar 1-2 hours after the start of a meal Less than 180 mg/dL A1C Less than 7%    Eye Health:   You need a diabetic eye exam yearly.   Please have a copy of the note faxed to my office.   Fax: [phone]    Foot Health:   You need a diabetic foot exam yearly.   Check your feet routinely for any wounds.   You should always check your shoes for any debris that could cause a wound.

## 2024-02-29 LAB
BACTERIA UR CULT: ABNORMAL
BACTERIA UR CULT: ABNORMAL
OTHER ANTIBIOTIC SUSC ISLT: ABNORMAL

## 2024-02-29 NOTE — PROGRESS NOTES
Please call and notify  Mr. Lira,     Recent lab work     Diabetes is much better controlled with A1C down to 6.4%    No medication changes needed.       WCN

## 2024-03-01 ENCOUNTER — TELEPHONE (OUTPATIENT)
Dept: INTERNAL MEDICINE | Facility: CLINIC | Age: 89
End: 2024-03-01
Payer: MEDICARE

## 2024-03-01 DIAGNOSIS — N30.00 ACUTE CYSTITIS WITHOUT HEMATURIA: Primary | ICD-10-CM

## 2024-03-01 RX ORDER — CEPHALEXIN 250 MG/1
250 CAPSULE ORAL 3 TIMES DAILY
Qty: 21 CAPSULE | Refills: 0 | Status: SHIPPED | OUTPATIENT
Start: 2024-03-01 | End: 2024-03-08

## 2024-03-01 NOTE — TELEPHONE ENCOUNTER
TRISHA GONZALEZ TO READ     ----- Message from BETHEL Lozano III sent at 3/1/2024  7:37 AM EST -----  Please call and notify  Mr. Lira,     Recent urine culture came back with bacteria.   I have sent abx to his pharmacy.  [facility].    Take 3 times a day - for 7 days.   Notify me if symptoms worsen or doesn't improve.       WCN

## 2024-03-01 NOTE — PROGRESS NOTES
Please call and notify  Mr. Lira,     Recent urine culture came back with bacteria.   I have sent abx to his pharmacy.  [facility].    Take 3 times a day - for 7 days.   Notify me if symptoms worsen or doesn't improve.       WCN

## 2024-03-04 NOTE — TELEPHONE ENCOUNTER
Name: Gera Lira    Relationship: Self    Best Callback Number: [phone]    HUB PROVIDED THE RELAY MESSAGE FROM THE OFFICE   PATIENT VOICED UNDERSTANDING AND HAS NO FURTHER QUESTIONS AT THIS TIME    ADDITIONAL INFORMATION:

## 2024-03-15 ENCOUNTER — TELEPHONE (OUTPATIENT)
Dept: INTERNAL MEDICINE | Facility: CLINIC | Age: 89
End: 2024-03-15
Payer: MEDICARE

## 2024-03-15 NOTE — TELEPHONE ENCOUNTER
Patient seen arian on 02/26/24 for urinary issues, complains of having mucus in his urine. Patient was prescribed antibiotic but is still having mucus in urine. Made patient an appointment with arian for Monday.     Please advise

## 2024-03-15 NOTE — TELEPHONE ENCOUNTER
Caller: Gera Lira    Relationship to patient: Self    Best call back number: [phone]    Patient is needing: HE FINISHED THE MEDICATION BUT IT IS STILL A PROBLEM.  DO YOU NEED TO SEE HIM AGAIN OR CAN YOU PRESCRIBE SOMETHING ELSE?

## 2024-03-18 ENCOUNTER — OFFICE VISIT (OUTPATIENT)
Dept: INTERNAL MEDICINE | Facility: CLINIC | Age: 89
End: 2024-03-18
Payer: MEDICARE

## 2024-03-18 VITALS
SYSTOLIC BLOOD PRESSURE: 124 MMHG | HEART RATE: 62 BPM | DIASTOLIC BLOOD PRESSURE: 78 MMHG | OXYGEN SATURATION: 98 % | BODY MASS INDEX: 22.61 KG/M2 | WEIGHT: 149.2 LBS | HEIGHT: 68 IN

## 2024-03-18 DIAGNOSIS — C61 CARCINOMA OF PROSTATE: Chronic | ICD-10-CM

## 2024-03-18 DIAGNOSIS — R82.90 ABNORMAL URINE FINDINGS: ICD-10-CM

## 2024-03-18 DIAGNOSIS — N39.9 URINARY PROBLEM IN MALE: Primary | ICD-10-CM

## 2024-03-18 DIAGNOSIS — I10 ESSENTIAL HYPERTENSION: Chronic | ICD-10-CM

## 2024-03-18 LAB
BACTERIA UR QL AUTO: ABNORMAL /HPF
BILIRUB BLD-MCNC: NEGATIVE MG/DL
CLARITY, POC: ABNORMAL
COLOR UR: YELLOW
EXPIRATION DATE: ABNORMAL
GLUCOSE UR STRIP-MCNC: NEGATIVE MG/DL
KETONES UR QL: NEGATIVE
LEUKOCYTE EST, POC: ABNORMAL
Lab: ABNORMAL
NITRITE UR-MCNC: NEGATIVE MG/ML
PH UR: 6.5 [PH] (ref 5–8)
PROT UR STRIP-MCNC: ABNORMAL MG/DL
RBC # UR STRIP: ABNORMAL /HPF
RBC # UR STRIP: ABNORMAL /UL
SP GR UR: 1.01 (ref 1–1.03)
UROBILINOGEN UR QL: ABNORMAL
WBC # UR STRIP: ABNORMAL /HPF
WBC CLUMPS # UR AUTO: ABNORMAL /HPF

## 2024-03-18 PROCEDURE — 1159F MED LIST DOCD IN RCRD: CPT | Performed by: NURSE PRACTITIONER

## 2024-03-18 PROCEDURE — 99214 OFFICE O/P EST MOD 30 MIN: CPT | Performed by: NURSE PRACTITIONER

## 2024-03-18 PROCEDURE — 1160F RVW MEDS BY RX/DR IN RCRD: CPT | Performed by: NURSE PRACTITIONER

## 2024-03-18 PROCEDURE — 81003 URINALYSIS AUTO W/O SCOPE: CPT | Performed by: NURSE PRACTITIONER

## 2024-03-18 PROCEDURE — 81001 URINALYSIS AUTO W/SCOPE: CPT | Performed by: NURSE PRACTITIONER

## 2024-03-18 RX ORDER — TADALAFIL 20 MG/1
1 TABLET ORAL DAILY
COMMUNITY
Start: 2024-03-06

## 2024-03-18 RX ORDER — CIPROFLOXACIN 500 MG/1
500 TABLET, FILM COATED ORAL 2 TIMES DAILY
Qty: 14 TABLET | Refills: 0 | Status: SHIPPED | OUTPATIENT
Start: 2024-03-18 | End: 2024-03-25

## 2024-03-18 NOTE — PROGRESS NOTES
"        Chief Complaint  Urine Problem      Subjective:      History of Present Illness {CC  Problem List  Visit  Diagnosis   Encounters  Notes  Medications  Labs  Result Review Imaging  Media :23}     Gera Lira presents to [facility] for:        LV: 2/6/24  Urine culture proteus mirabilis 25-50k  Treated with keflex. States symptoms improved. Now has worsened.     Urinary Tract Infection   This is a recurrent problem. The current episode started in the past 7 days. The problem occurs intermittently. The quality of the pain is described as aching. Associated symptoms include frequency and urgency. Pertinent negatives include no flank pain. His past medical history is significant for a urological procedure.     He states his urine will \"look like it has plastic in it\".   No blood. No hx of stones.      Relevant history:    Prostate cancer   1/31/24: urology: cytso   Findings:  Bladder neck: normal and patent  Bladder: normal bladder without suspicious abnormalities  Ureteral Orifices:   - Right: normal caliber, orthotopic  - Left: normal caliber, orthotopic  Prostate: no enlargement, no abnormalities  Urethra: meatal stenosis, able to pass scope with significant amount of force, slight 18-Fr soft stricture in bulbar urethra, otherwise, urethra looks okay       His wife is with him today: there is also concern about BP.   States at home always reads high.  Continues norvasc, hydralazine, losartan, BB.  No cardiac symptoms.   He has brought his cuff here today to compare.     I have reviewed patient's medical history, any new submitted information provided by patient or medical assistant and updated medical record.      Objective:      Physical Exam   Result Review  Data Reviewed:{ Labs  Result Review  Imaging  Med Tab  Media :23}     The following data was reviewed by: Gage Pro III, JANINE-C on 03/18/2024  UA          2/26/2024    10:00 3/18/2024    " "14:05   Urinalysis   Ketones, UA Negative  Negative    Leukocytes, UA Negative  Large (3+)    RBC, UA 0-2  0-2    WBC, UA 11-20  Too Numerous to Count    Bacteria, UA None Seen  Trace      URINE DIPSTICK:  Lab Results - Last 18 Months   Lab Units 03/18/24  1405   COLOR UA  Yellow   CLARITY UA  Cloudy*   SPECIFIC GRAVITY, URINE  1.015   PH, URINE  6.5   LEUKOCYTES UA  Large (3+)*   NITRITE UA  Negative   PROTEIN UA mg/dL 2+*   UROBILINOGEN UA  0.2 E.U./dL   BILIRUBIN UA  Negative   BLOOD UA  Trace*          Vital Signs:   /78 (BP Location: Left arm, Patient Position: Sitting, Cuff Size: Adult)   Pulse 62   Ht 172.7 cm (68\")   Wt 67.7 kg (149 lb 3.2 oz)   SpO2 98%   BMI 22.69 kg/m²         Requested Prescriptions     Signed Prescriptions Disp Refills    ciprofloxacin (Cipro) 500 MG tablet 14 tablet 0     Sig: Take 1 tablet by mouth 2 (Two) Times a Day for 7 days.       Routine medications provided by this office will also be refilled via pharmacy request.       Current Outpatient Medications:     ALBUTEROL SULFATE  (90 Base) MCG/ACT inhaler, INHALE 2 PUFFS BY MOUTH EVERY 4 HOURS AS NEEDED, Disp: 42.5 g, Rfl: 0    amLODIPine (NORVASC) 10 MG tablet, TAKE 1 TABLET BY MOUTH DAILY, Disp: 90 tablet, Rfl: 2    aspirin (aspirin) 81 MG EC tablet, Take 1 tablet by mouth Daily., Disp: , Rfl:     ezetimibe (ZETIA) 10 MG tablet, Take 1 tablet by mouth Daily., Disp: 90 tablet, Rfl: 3    glimepiride (AMARYL) 2 MG tablet, Take 1 tablet by mouth Every Morning Before Breakfast., Disp: 90 tablet, Rfl: 1    hydrALAZINE (APRESOLINE) 25 MG tablet, TAKE 1 TABLET BY MOUTH TWICE DAILY, Disp: 180 tablet, Rfl: 1    linagliptin (Tradjenta) 5 MG tablet tablet, TAKE 1 TABLET BY MOUTH DAILY, Disp: 90 tablet, Rfl: 2    Livalo 2 MG tablet tablet, TAKE 1 TABLET BY MOUTH EVERY NIGHT, Disp: 90 tablet, Rfl: 1    losartan (COZAAR) 50 MG tablet, Take 1.5 tablets by mouth Daily., Disp: 45 tablet, Rfl: 5    metoprolol succinate XL " (TOPROL-XL) 50 MG 24 hr tablet, Take 1 tablet by mouth Every Night., Disp: 90 tablet, Rfl: 1    Omega-3 Fatty Acids (FISH OIL) 1200 MG capsule capsule, Take 1 capsule by mouth Every Night. HELD FOR OR, Disp: , Rfl:     tadalafil (CIALIS) 20 MG tablet, Take 1 tablet by mouth Daily., Disp: , Rfl:     ciprofloxacin (Cipro) 500 MG tablet, Take 1 tablet by mouth 2 (Two) Times a Day for 7 days., Disp: 14 tablet, Rfl: 0     Assessment and Plan:      Assessment and Plan {CC Problem List  Visit Diagnosis  ROS  Review (Popup)  Health Maintenance  Quality  BestPractice  Medications  SmartSets  SnapShot Encounters  Media :23}     Problem List Items Addressed This Visit          Cardiac and Vasculature    Essential hypertension (Chronic)    Current Assessment & Plan     Hypertension is improving with treatment.  Continue current treatment regimen.  Continue current medications.  Blood pressure will be reassessed at the next regular appointment.    Spent considerable time manual BP and with his cuff.   Manual was comparable to MA reading.  Personal cuff showed systolic in 190s.                   Hematology and Neoplasia    Carcinoma of prostate (Chronic)    Overview     Formatting of this note might be different from the original. Aug 08, 2019 Entered By: DAVID FONTANA Comment: Proton Therapy          Other Visit Diagnoses       Urinary problem in male    -  Primary    Relevant Medications    ciprofloxacin (Cipro) 500 MG tablet    Other Relevant Orders    POC Urinalysis Dipstick, Automated (Completed)          Advised to check glucose twice a day will on abx.     Advised to get new BP cuff: one he brought with him today read 40 points higher.     I even check his wife with him today and her pressure had about a 40 point difference.     Gave him a strainer for urine and showed him and wife how to use.  I personally went and looked at urine - nothing looked abnormal to me in the way of sediment.     I spent 33  minutes caring for Gera on this date of service. This time includes time spent by me in the following activities: preparing for the visit, reviewing tests, performing a medically appropriate examination and/or evaluation, counseling and educating the patient/family/caregiver, ordering medications, tests, or procedures, referring and communicating with other health care professionals, documenting information in the medical record, and independently interpreting results and communicating that information with the patient/family/caregiver     Follow Up {Instructions Charge Capture  Follow-up Communications :23}     Return if symptoms worsen or fail to improve.  If doesn't improve or worsens - will need to follow up with urology.   If acute change: fever, chills, flank pain, not voiding - ER.     Patient was given instructions and counseling regarding his condition or for health maintenance advice. Please see specific information pulled into the AVS if appropriate.    Lilian disclaimer:   Much of this encounter note is an electronic transcription/translation of spoken language to printed text. The electronic translation of spoken language may permit erroneous, or at times, nonsensical words or phrases to be inadvertently transcribed; Although I have reviewed the note for such errors, some may still exist.     Additional Patient Counseling:       There are no Patient Instructions on file for this visit.

## 2024-03-19 NOTE — ASSESSMENT & PLAN NOTE
Hypertension is improving with treatment.  Continue current treatment regimen.  Continue current medications.  Blood pressure will be reassessed at the next regular appointment.    Spent considerable time manual BP and with his cuff.   Manual was comparable to MA reading.  Personal cuff showed systolic in 190s.

## 2024-03-20 LAB
BACTERIA UR CULT: ABNORMAL
BACTERIA UR CULT: ABNORMAL
OTHER ANTIBIOTIC SUSC ISLT: ABNORMAL

## 2024-04-27 DIAGNOSIS — E78.2 MIXED HYPERLIPIDEMIA: ICD-10-CM

## 2024-04-27 DIAGNOSIS — I10 HYPERTENSION, ESSENTIAL: ICD-10-CM

## 2024-04-27 DIAGNOSIS — E11.9 DIABETES MELLITUS WITHOUT COMPLICATION: ICD-10-CM

## 2024-04-29 RX ORDER — AMLODIPINE BESYLATE 10 MG/1
10 TABLET ORAL DAILY
Qty: 90 TABLET | Refills: 2 | Status: SHIPPED | OUTPATIENT
Start: 2024-04-29

## 2024-06-07 ENCOUNTER — OFFICE VISIT (OUTPATIENT)
Dept: INTERNAL MEDICINE | Facility: CLINIC | Age: 89
End: 2024-06-07
Payer: MEDICARE

## 2024-06-07 VITALS
HEART RATE: 74 BPM | OXYGEN SATURATION: 98 % | WEIGHT: 150.2 LBS | BODY MASS INDEX: 22.76 KG/M2 | DIASTOLIC BLOOD PRESSURE: 82 MMHG | HEIGHT: 68 IN | SYSTOLIC BLOOD PRESSURE: 122 MMHG

## 2024-06-07 DIAGNOSIS — I87.303 STASIS EDEMA OF BOTH LOWER EXTREMITIES: Primary | ICD-10-CM

## 2024-06-07 PROCEDURE — 99213 OFFICE O/P EST LOW 20 MIN: CPT | Performed by: NURSE PRACTITIONER

## 2024-06-07 PROCEDURE — 1159F MED LIST DOCD IN RCRD: CPT | Performed by: NURSE PRACTITIONER

## 2024-06-07 PROCEDURE — 1126F AMNT PAIN NOTED NONE PRSNT: CPT | Performed by: NURSE PRACTITIONER

## 2024-06-07 PROCEDURE — 1160F RVW MEDS BY RX/DR IN RCRD: CPT | Performed by: NURSE PRACTITIONER

## 2024-06-07 PROCEDURE — G2211 COMPLEX E/M VISIT ADD ON: HCPCS | Performed by: NURSE PRACTITIONER

## 2024-06-07 RX ORDER — POTASSIUM CHLORIDE 750 MG/1
10 TABLET, FILM COATED, EXTENDED RELEASE ORAL EVERY MORNING
Qty: 4 TABLET | Refills: 0 | Status: SHIPPED | OUTPATIENT
Start: 2024-06-07

## 2024-06-07 RX ORDER — FUROSEMIDE 20 MG/1
20 TABLET ORAL EVERY MORNING
Qty: 4 TABLET | Refills: 0 | Status: SHIPPED | OUTPATIENT
Start: 2024-06-07

## 2024-06-07 NOTE — PATIENT INSTRUCTIONS
Limit / avoid salt. This is contributing to swelling in feet/ lower extremities.     Elevate feet at night on 2 pillows.     Take water pill and potassium once every morning for 4 days.   Start tomorrow.

## 2024-06-07 NOTE — PROGRESS NOTES
"        Chief Complaint  Lower Extremity Issue (Swollen ankles 2 weeks. )     Subjective:      History of Present Illness {CC  Problem List  Visit  Diagnosis   Encounters  Notes  Medications  Labs  Result Review Imaging  Media :23}     Established patient with hypertension, CKD, diabetes    Gera Lira presents to [facility] for:      Leg Swelling  This is a new problem. The current episode started 1 to 4 weeks ago. The problem occurs daily. The problem has been waxing and waning. Associated symptoms comments: No soa, CP.   States wife adds lots of salt to food and she is currently having the same issue.     Denies leg pain.  No injury.       I have reviewed patient's medical history, any new submitted information provided by patient or medical assistant and updated medical record.      Objective:      Physical Exam  Neck:      Comments: - JVD   Cardiovascular:      Pulses: Normal pulses.      Heart sounds: Normal heart sounds.   Pulmonary:      Effort: Pulmonary effort is normal.      Breath sounds: Normal breath sounds.      Comments: No C/W  Musculoskeletal:      Comments: Trace bl pedal edema: equal, no redness or wounds.         Result Review  Data Reviewed:{ Labs  Result Review  Imaging  Med Tab  Media :23}     The following data was reviewed by: Gage Pro III, NP-C on 06/07/2024  Common labs          8/28/2023    12:03 1/16/2024    11:15 2/26/2024    09:59   Common Labs   Glucose 196  200  123    BUN 24  17  17    Creatinine 1.31  1.46  1.35    Sodium 142  143  142    Potassium 4.1  4.3  4.3    Chloride 103  104  104    Calcium 9.4  8.9  8.9    Albumin 5.0      WBC  7.37  6.81    Hemoglobin  10.5  10.5    Hematocrit  34.6  34.9    Platelets  283  275    Hemoglobin A1C  8.10  6.40             Vital Signs:   /82 (BP Location: Left arm, Patient Position: Sitting, Cuff Size: Adult)   Pulse 74   Ht 172.7 cm (68\")   Wt 68.1 kg (150 lb 3.2 " "oz)   SpO2 98%   BMI 22.84 kg/m²   Estimated body mass index is 22.84 kg/m² as calculated from the following:    Height as of this encounter: 172.7 cm (68\").    Weight as of this encounter: 68.1 kg (150 lb 3.2 oz).        Requested Prescriptions     Signed Prescriptions Disp Refills    furosemide (Lasix) 20 MG tablet 4 tablet 0     Sig: Take 1 tablet by mouth Every Morning.    potassium chloride 10 MEQ CR tablet 4 tablet 0     Sig: Take 1 tablet by mouth Every Morning.       Routine medications provided by this office will also be refilled via pharmacy request.       Current Outpatient Medications:     ALBUTEROL SULFATE  (90 Base) MCG/ACT inhaler, INHALE 2 PUFFS BY MOUTH EVERY 4 HOURS AS NEEDED, Disp: 42.5 g, Rfl: 0    amLODIPine (NORVASC) 10 MG tablet, TAKE 1 TABLET BY MOUTH DAILY, Disp: 90 tablet, Rfl: 2    aspirin (aspirin) 81 MG EC tablet, Take 1 tablet by mouth Daily., Disp: , Rfl:     ezetimibe (ZETIA) 10 MG tablet, Take 1 tablet by mouth Daily., Disp: 90 tablet, Rfl: 3    glimepiride (AMARYL) 2 MG tablet, Take 1 tablet by mouth Every Morning Before Breakfast., Disp: 90 tablet, Rfl: 1    hydrALAZINE (APRESOLINE) 25 MG tablet, TAKE 1 TABLET BY MOUTH TWICE DAILY, Disp: 180 tablet, Rfl: 1    linagliptin (Tradjenta) 5 MG tablet tablet, TAKE 1 TABLET BY MOUTH DAILY, Disp: 90 tablet, Rfl: 2    Livalo 2 MG tablet tablet, TAKE 1 TABLET BY MOUTH EVERY NIGHT, Disp: 90 tablet, Rfl: 1    losartan (COZAAR) 50 MG tablet, Take 1.5 tablets by mouth Daily., Disp: 45 tablet, Rfl: 5    metoprolol succinate XL (TOPROL-XL) 50 MG 24 hr tablet, Take 1 tablet by mouth Every Night., Disp: 90 tablet, Rfl: 1    Omega-3 Fatty Acids (FISH OIL) 1200 MG capsule capsule, Take 1 capsule by mouth Every Night. HELD FOR OR, Disp: , Rfl:     tadalafil (CIALIS) 20 MG tablet, Take 1 tablet by mouth Daily., Disp: , Rfl:     furosemide (Lasix) 20 MG tablet, Take 1 tablet by mouth Every Morning., Disp: 4 tablet, Rfl: 0    potassium chloride " 10 MEQ CR tablet, Take 1 tablet by mouth Every Morning., Disp: 4 tablet, Rfl: 0     Assessment and Plan:      Assessment and Plan {CC Problem List  Visit Diagnosis  ROS  Review (Popup)  Health Maintenance  Quality  BestPractice  Medications  SmartSets  SnapShot Encounters  Media :23}     Diagnoses and all orders for this visit:    1. 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  Dispense: 4 tablet; Refill: 0  -     potassium chloride 10 MEQ CR tablet; Take 1 tablet by mouth Every Morning.  Dispense: 4 tablet; Refill: 0             New Medications Ordered This Visit   Medications    furosemide (Lasix) 20 MG tablet     Sig: Take 1 tablet by mouth Every Morning.     Dispense:  4 tablet     Refill:  0    potassium chloride 10 MEQ CR tablet     Sig: Take 1 tablet by mouth Every Morning.     Dispense:  4 tablet     Refill:  0       I discussed medication use, dosing and possible side effects.   Patient was given opportunity to ask any questions.    See note below:     Diff: related to norvasc but has been on chronically.   As wife has similar: likely related to sodium intake.     Follow Up {Instructions Charge Capture  Follow-up Communications :23}     Return if symptoms worsen or fail to improve.      Patient was given instructions and counseling regarding his condition or for health maintenance advice. Please see specific information pulled into the AVS if appropriate.    Lilian disclaimer:   Much of this encounter note is an electronic transcription/translation of spoken language to printed text. The electronic translation of spoken language may permit erroneous, or at times, nonsensical words or phrases to be inadvertently transcribed; Although I have reviewed the note for such errors, some may still exist.     Additional Patient Counseling:       Patient Instructions     Limit / avoid salt. This is contributing to  swelling in feet/ lower extremities.     Elevate feet at night on 2 pillows.     Take water pill and potassium once every morning for 4 days.   Start tomorrow.

## 2024-07-16 ENCOUNTER — TELEPHONE (OUTPATIENT)
Dept: INTERNAL MEDICINE | Facility: CLINIC | Age: 89
End: 2024-07-16
Payer: MEDICARE

## 2024-07-16 NOTE — TELEPHONE ENCOUNTER
Hub staff attempted to follow warm transfer process and was unsuccessful     Caller: Jazmín Lira    Relationship to patient: Emergency Contact    Best call back number:  OR   EDNAS CELL    Patient is needing: PATIENTS WIFE JAZMÍN IS RETURNING A CALL TO THE OFFICE AND WANTS TO BE CALLED BACK.

## 2024-07-16 NOTE — TELEPHONE ENCOUNTER
Patient was calling to see if she missed a phone call from our office.     Advised no one has called her or her  and has a new pt appointment with Anthony.

## 2024-07-18 ENCOUNTER — OFFICE VISIT (OUTPATIENT)
Dept: INTERNAL MEDICINE | Facility: CLINIC | Age: 89
End: 2024-07-18
Payer: MEDICARE

## 2024-07-18 VITALS
WEIGHT: 148.5 LBS | HEIGHT: 68 IN | HEART RATE: 85 BPM | OXYGEN SATURATION: 98 % | BODY MASS INDEX: 22.51 KG/M2 | SYSTOLIC BLOOD PRESSURE: 132 MMHG | DIASTOLIC BLOOD PRESSURE: 68 MMHG

## 2024-07-18 DIAGNOSIS — E78.2 MIXED HYPERLIPIDEMIA: ICD-10-CM

## 2024-07-18 DIAGNOSIS — E11.59 TYPE 2 DIABETES MELLITUS WITH OTHER CIRCULATORY COMPLICATION, WITHOUT LONG-TERM CURRENT USE OF INSULIN: Chronic | ICD-10-CM

## 2024-07-18 DIAGNOSIS — I10 ESSENTIAL HYPERTENSION: Chronic | ICD-10-CM

## 2024-07-18 DIAGNOSIS — C61 CARCINOMA OF PROSTATE: Chronic | ICD-10-CM

## 2024-07-18 DIAGNOSIS — N18.31 STAGE 3A CHRONIC KIDNEY DISEASE: Chronic | ICD-10-CM

## 2024-07-18 DIAGNOSIS — Z00.00 MEDICARE ANNUAL WELLNESS VISIT, SUBSEQUENT: Primary | ICD-10-CM

## 2024-07-18 DIAGNOSIS — I25.10 CORONARY ARTERY DISEASE INVOLVING NATIVE CORONARY ARTERY OF NATIVE HEART WITHOUT ANGINA PECTORIS: Chronic | ICD-10-CM

## 2024-07-18 DIAGNOSIS — Z90.49 HISTORY OF COLON RESECTION: ICD-10-CM

## 2024-07-18 DIAGNOSIS — H90.6 MIXED CONDUCTIVE AND SENSORINEURAL HEARING LOSS OF BOTH EARS: Chronic | ICD-10-CM

## 2024-07-18 DIAGNOSIS — H35.3131 NONEXUDATIVE AGE-RELATED MACULAR DEGENERATION, BILATERAL, EARLY DRY STAGE: ICD-10-CM

## 2024-07-18 PROBLEM — E78.5 HYPERLIPIDEMIA: Chronic | Status: ACTIVE | Noted: 2023-07-26

## 2024-07-18 PROCEDURE — 1170F FXNL STATUS ASSESSED: CPT | Performed by: NURSE PRACTITIONER

## 2024-07-18 PROCEDURE — 1160F RVW MEDS BY RX/DR IN RCRD: CPT | Performed by: NURSE PRACTITIONER

## 2024-07-18 PROCEDURE — 1126F AMNT PAIN NOTED NONE PRSNT: CPT | Performed by: NURSE PRACTITIONER

## 2024-07-18 PROCEDURE — 1159F MED LIST DOCD IN RCRD: CPT | Performed by: NURSE PRACTITIONER

## 2024-07-18 PROCEDURE — 99214 OFFICE O/P EST MOD 30 MIN: CPT | Performed by: NURSE PRACTITIONER

## 2024-07-18 PROCEDURE — G0439 PPPS, SUBSEQ VISIT: HCPCS | Performed by: NURSE PRACTITIONER

## 2024-07-18 NOTE — ASSESSMENT & PLAN NOTE
Lipid abnormalities are improving with treatment    Plan:  Continue same medication/s without change.    Continue livalo, zetia and fish oil.     Discussed medication dosage, use, side effects, and goals of treatment in detail.    Counseled patient on lifestyle modifications to help control hyperlipidemia.     Patient Treatment Goals:   LDL goal is less than 70    Lab Results   Component Value Date    CHOL 159 11/24/2020    CHLPL 145 07/11/2023    TRIG 73 07/11/2023    HDL 72 07/11/2023    LDL 59 07/11/2023      Followup in 6 months.

## 2024-07-18 NOTE — ASSESSMENT & PLAN NOTE
Lab Results   Component Value Date    CREATININE 1.35 (H) 02/26/2024        Avoid nephrotoxic medications - especially nsaids (like Ibuprofen, aleve, motrin): tylenol is generally safe unless you have been advised not to take.   Follow no added salt diet (goal would be to have less than    Avoid colored sodas (coke/pepsi)    Maintain blood pressure control: goal less than 130/80 to slow progression of CKD.   Maintain blood sugar control     Daily sodium content from all sources should be less than 2 grams or 2000 mg     Some information about reading labels   Understanding the terms:   Sodium Free - Only a trivial amount of sodium per serving.   Very Low Sodium - 35 mg or less per serving.   Low Sodium - 140 mg or less per serving.   Reduced Sodium - Foods in which the level of sodium is reduced by 25%.   Light or Lite in Sodium - Foods in which the sodium is reduced by at least 50% .   Simple rule of thumb : If salt is listed in the first five ingredients, the item is probably too high in sodium to use.     All food labels have milligrams (mg) of sodium listed. Follow these steps when reading the sodium information on the label:   1. Know how much sodium you are allowed each day.   Remember that there are 1000 milligrams (mg) in 1 gram.  2. Look at the package label. Check the serving size. Nutrition values are expressed per serving.   How does this compare to your total daily allowance? If the sodium level is 500 mg or more per serving, the item is not a good choice.   3. Compare labels of similar products. Select the lowest sodium level for the same serving size.     For additional information, look at the the National Kidney Foundation Internet site at www.kidney.org

## 2024-07-18 NOTE — ASSESSMENT & PLAN NOTE
Hypertension is improving with treatment.  Continue current treatment regimen.  Continue current medications.  Blood pressure will be reassessed at the next regular appointment.    Continue norvasc, losartan, toprol, hydralazine.    Continue low sodium diet.

## 2024-07-18 NOTE — ASSESSMENT & PLAN NOTE
Your last A1C (3 month average) =   Lab Results   Component Value Date    HGBA1C 6.40 (H) 02/26/2024      Your diabetes is Controlled.  Continue amaryl and tradjenta.     The American Diabetes Association recommends an A1C of less than 7%.  A1C Average Levels Blood Sugar:   6%  126 mg/dL  7%  154 mg/dL  8%  183 mg/dL  9%  212 mg/dL  10%  240 mg/dL  11%  269 mg/dL  12%  298 mg/dL    Glucose goals for many adults with diabetes  Blood sugar before meals  mg/dL  Blood sugar 1-2 hours after the start of a meal Less than 180 mg/dL A1C Less than 7%    Eye Health:   You need a diabetic eye exam yearly.   Please have a copy of the note faxed to my office.   Fax: [phone]    Foot Health:   You need a diabetic foot exam yearly.   Check your feet routinely for any wounds.   You should always check your shoes for any debris that could cause a wound.

## 2024-07-18 NOTE — PROGRESS NOTES
The ABCs of the Annual Wellness Visit  Subsequent Medicare Wellness Visit    Subjective    Gera Lira is a 89 y.o. male who presents for a Subsequent Medicare Wellness Visit.    The following portions of the patient's history were reviewed and   updated as appropriate: allergies, current medications, past family history, past medical history, past social history, past surgical history, and problem list.    Wt Readings from Last 4 Encounters:   07/18/24 67.4 kg (148 lb 8 oz)   06/07/24 68.1 kg (150 lb 3.2 oz)   03/18/24 67.7 kg (149 lb 3.2 oz)   02/26/24 67.7 kg (149 lb 3.2 oz)       Weight trend is improving.    His cardiovascular risks are:    [] No Known risk factors  [] Known CAD and being treated     [x] Hypertension    [x] Hyperlipidemia  [x] Diabetes     [] Obesity  [] Family history    [] Current or hx tobacco use  [] Sedentary lifestyle       Male:   [] Testosterone use     Mobility    [x] Ambulates independently  [] Ambulates with cane   [] Ambulates with quad cane  [] Ambulates with rollator   [] Ambulates with walker   [] Mobile with wheelchair   [] Mobile with electric wheelchair     Continence    [x] Continent of bowel and bladder  [] Incontinent bowel and bladder   [] Incontinent bladder   [] Incontinent bowel      Social     Went to [place] for reunion with wife.     Compared to one year ago, the patient feels his physical   health is the same.    Compared to one year ago, the patient feels his mental   health is the same.    Recent Hospitalizations:  He was not admitted to the hospital during the last year.       Current Medical Providers:  Patient Care Team:  Gage Pro III, NP-C as PCP - General (Family Medicine)  Raj Vidales MD as Consulting Physician (Cardiology)  Terrell Obrien MD as Consulting Physician (Urology)    Outpatient Medications Prior to Visit   Medication Sig Dispense Refill    ALBUTEROL SULFATE  (90 Base) MCG/ACT inhaler INHALE 2 PUFFS BY MOUTH  EVERY 4 HOURS AS NEEDED 42.5 g 0    amLODIPine (NORVASC) 10 MG tablet TAKE 1 TABLET BY MOUTH DAILY 90 tablet 2    aspirin (aspirin) 81 MG EC tablet Take 1 tablet by mouth Daily.      ezetimibe (ZETIA) 10 MG tablet Take 1 tablet by mouth Daily. 90 tablet 3    glimepiride (AMARYL) 2 MG tablet Take 1 tablet by mouth Every Morning Before Breakfast. 90 tablet 1    hydrALAZINE (APRESOLINE) 25 MG tablet TAKE 1 TABLET BY MOUTH TWICE DAILY 180 tablet 1    linagliptin (Tradjenta) 5 MG tablet tablet TAKE 1 TABLET BY MOUTH DAILY 90 tablet 2    Livalo 2 MG tablet tablet TAKE 1 TABLET BY MOUTH EVERY NIGHT 90 tablet 1    losartan (COZAAR) 50 MG tablet Take 1.5 tablets by mouth Daily. 45 tablet 5    metoprolol succinate XL (TOPROL-XL) 50 MG 24 hr tablet Take 1 tablet by mouth Every Night. 90 tablet 1    Omega-3 Fatty Acids (FISH OIL) 1200 MG capsule capsule Take 1 capsule by mouth Every Night. HELD FOR OR      tadalafil (CIALIS) 20 MG tablet Take 1 tablet by mouth Daily.      furosemide (Lasix) 20 MG tablet Take 1 tablet by mouth Every Morning. 4 tablet 0    potassium chloride 10 MEQ CR tablet Take 1 tablet by mouth Every Morning. 4 tablet 0     No facility-administered medications prior to visit.       No opioid medication identified on active medication list. I have reviewed chart for other potential  high risk medication/s and harmful drug interactions in the elderly.        Aspirin is on active medication list. Aspirin use is indicated based on review of current medical condition/s. Pros and cons of this therapy have been discussed today. Benefits of this medication outweigh potential harm.  Patient has been encouraged to continue taking this medication.  .      Patient Active Problem List   Diagnosis    Right carotid bruit    Coronary artery disease    Diabetes mellitus    Dyslipidemia    Essential hypertension    Stenosis of right carotid artery    Hx of radiation therapy    Stage 3a chronic kidney disease    Colonic mass     "Mixed conductive and sensorineural hearing loss of both ears    Erectile dysfunction    History of colon resection / unresectable colon polyp    Unspecified hyperplasia of prostate without urinary obstruction and other lower urinary tract symptoms (LUTS)    Presence of aortocoronary bypass graft    Other specified postprocedural states    Other retention of urine    Nonexudative age-related macular degeneration, bilateral, early dry stage    Hyperlipidemia    History of bilateral cataract extraction    Encounter for fitting and adjustment of hearing aid    Corn or callus    Conductive hearing loss, unspecified    Carcinoma of prostate    Anemia     Advance Care Planning  (Click this link to access ACP Navigator)      Advance Directive is not on file.  ACP discussion was held with the patient during this visit. Patient has an advance directive (not in EMR), copy requested.     Objective    Vitals:    24 1025   BP: 132/68   BP Location: Left arm   Patient Position: Sitting   Cuff Size: Adult   Pulse: 85   SpO2: 98%   Weight: 67.4 kg (148 lb 8 oz)   Height: 172.7 cm (68\")   PainSc: 0-No pain     Estimated body mass index is 22.58 kg/m² as calculated from the following:    Height as of this encounter: 172.7 cm (68\").    Weight as of this encounter: 67.4 kg (148 lb 8 oz).    BMI is within normal parameters. No other follow-up for BMI required.      Jump to [facility] Fall Risk Flowsheet  Gait and Balance Evaluation:  Slow Tentative Pace      Does the patient have evidence of cognitive impairment? No          HEALTH RISK ASSESSMENT    Smoking Status:  Social History     Tobacco Use   Smoking Status Former    Current packs/day: 0.00    Average packs/day: 0.5 packs/day for 20.0 years (10.0 ttl pk-yrs)    Types: Cigarettes    Start date: 1950    Quit date: 1970    Years since quittin.5   Smokeless Tobacco Never   Tobacco Comments    Caffeine - Yes     Alcohol Consumption:  Social History     Substance and " "Sexual Activity   Alcohol Use Not Currently    Comment: \"very little\" on occasion     Fall Risk Screen:    [facility] Fall Risk Assessment was completed, and patient is at LOW risk for falls.Assessment completed on:2024    Depression Screenin/18/2024    10:27 AM   PHQ-2/PHQ-9 Depression Screening   Little Interest or Pleasure in Doing Things 0-->not at all   Feeling Down, Depressed or Hopeless 0-->not at all   PHQ-9: Brief Depression Severity Measure Score 0       Health Habits and Functional and Cognitive Screenin/18/2024    10:28 AM   Functional & Cognitive Status   Do you have difficulty preparing food and eating? No   Do you have difficulty bathing yourself, getting dressed or grooming yourself? No   Do you have difficulty using the toilet? No   Do you have difficulty moving around from place to place? No   Do you have trouble with steps or getting out of a bed or a chair? No   Current Diet Well Balanced Diet   Dental Exam Up to date   Eye Exam Up to date   Exercise (times per week) 0 times per week   Current Exercises Include No Regular Exercise   Do you need help using the phone?  No   Are you deaf or do you have serious difficulty hearing?  Yes   Do you need help to go to places out of walking distance? No   Do you need help shopping? No   Do you need help preparing meals?  No   Do you need help with housework?  No   Do you need help with laundry? No   Do you need help taking your medications? No   Do you need help managing money? No   Do you ever drive or ride in a car without wearing a seat belt? No   Have you felt unusual stress, anger or loneliness in the last month? No   Who do you live with? Spouse   If you need help, do you have trouble finding someone available to you? No   Have you been bothered in the last four weeks by sexual problems? No   Do you have difficulty concentrating, remembering or making decisions? No       Age-appropriate Screening Schedule:  Refer to the list below " for future screening recommendations based on patient's age, sex and/or medical conditions. Orders for these recommended tests are listed in the plan section. The patient has been provided with a written plan.    Health Maintenance   Topic Date Due    DIABETIC EYE EXAM  Never done    ZOSTER VACCINE (1 of 2) Never done    RSV Vaccine - Adults (1 - 1-dose 60+ series) Never done    Pneumococcal Vaccine 65+ (2 of 2 - PPSV23 or PCV20) 06/22/2017    URINE MICROALBUMIN  11/24/2021    COVID-19 Vaccine (9 - 2023-24 season) 01/28/2024    ANNUAL WELLNESS VISIT  07/11/2024    LIPID PANEL  07/11/2024    HEMOGLOBIN A1C  08/26/2024    INFLUENZA VACCINE  08/01/2024    DIABETIC FOOT EXAM  07/18/2025    TDAP/TD VACCINES  Discontinued                CMS Preventative Services Quick Reference  Risk Factors Identified During Encounter  Immunizations Discussed/Encouraged: COVID19 and RSV (Respiratory Syncytial Virus): Fall 2024       The above risks/problems have been discussed with the patient.  Pertinent information has been shared with the patient in the After Visit Summary.  An After Visit Summary and PPPS were made available to the patient.    Follow Up:   Next Medicare Wellness visit to be scheduled in 1 year.       Additional E&M Note during same encounter follows:  Patient has multiple medical problems which are significant and separately identifiable that require additional work above and beyond the Medicare Wellness Visit.      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        LV: edema and gave 4 days of lasix.   Today states - got of salt and has resolved.     Prostate  "cancer: followed by Dr Obrien    [phone]  For glasses, hearing aid         Objective   Vital Signs:  /68 (BP Location: Left arm, Patient Position: Sitting, Cuff Size: Adult)   Pulse 85   Ht 172.7 cm (68\")   Wt 67.4 kg (148 lb 8 oz)   SpO2 98%   BMI 22.58 kg/m²     Physical Exam  Vitals reviewed.   Constitutional:       Appearance: Normal appearance. He is well-developed.   HENT:      Ears:      Comments: Hearing aids   Eyes:      Conjunctiva/sclera: Conjunctivae normal.   Neck:      Thyroid: No thyromegaly.   Cardiovascular:      Rate and Rhythm: Normal rate and regular rhythm.      Pulses: Normal pulses.      Heart sounds: Normal heart sounds.   Pulmonary:      Effort: Pulmonary effort is normal.      Breath sounds: Normal breath sounds.      Comments: E/U   Abdominal:      General: Bowel sounds are normal.   Musculoskeletal:         General: Normal range of motion.      Cervical back: Normal range of motion and neck supple.      Right lower leg: No edema.      Left lower leg: No edema.   Feet:      Comments: Diabetic Foot Exam Performed and Monofilament Test Performed     Lymphadenopathy:      Cervical: No cervical adenopathy.   Skin:     General: Skin is warm and dry.      Capillary Refill: Capillary refill takes 2 to 3 seconds.   Neurological:      Mental Status: He is alert and oriented to person, place, and time.   Psychiatric:         Mood and Affect: Mood normal.         Behavior: Behavior normal. Behavior is cooperative.         Thought Content: Thought content normal.         Judgment: Judgment normal.          The following data was reviewed by: Gage Pro III, NP-C on 07/18/2024:  Common labs          8/28/2023    12:03 1/16/2024    11:15 2/26/2024    09:59   Common Labs   Glucose 196  200  123    BUN 24  17  17    Creatinine 1.31  1.46  1.35    Sodium 142  143  142    Potassium 4.1  4.3  4.3    Chloride 103  104  104    Calcium " 9.4  8.9  8.9    Albumin 5.0      WBC  7.37  6.81    Hemoglobin  10.5  10.5    Hematocrit  34.6  34.9    Platelets  283  275    Hemoglobin A1C  8.10  6.40                 Assessment and Plan     Problem List Items Addressed This Visit          Cardiac and Vasculature    Coronary artery disease (Chronic)    Overview     Continue ASA and statin          Relevant Orders    Comprehensive Metabolic Panel    Lipid Panel With LDL / HDL Ratio    CBC (No Diff)    Essential hypertension (Chronic)    Current Assessment & Plan     Hypertension is improving with treatment.  Continue current treatment regimen.  Continue current medications.  Blood pressure will be reassessed at the next regular appointment.    Continue norvasc, losartan, toprol, hydralazine.    Continue low sodium diet.              Relevant Orders    Comprehensive Metabolic Panel    CBC (No Diff)    Hyperlipidemia (Chronic)    Current Assessment & Plan      Lipid abnormalities are improving with treatment    Plan:  Continue same medication/s without change.    Continue livalo, zetia and fish oil.     Discussed medication dosage, use, side effects, and goals of treatment in detail.    Counseled patient on lifestyle modifications to help control hyperlipidemia.     Patient Treatment Goals:   LDL goal is less than 70    Lab Results   Component Value Date    CHOL 159 11/24/2020    CHLPL 145 07/11/2023    TRIG 73 07/11/2023    HDL 72 07/11/2023    LDL 59 07/11/2023      Followup in 6 months.            ENT    Mixed conductive and sensorineural hearing loss of both ears (Chronic)    Overview     BL hearing aids             Endocrine and Metabolic    Diabetes mellitus (Chronic)    Current Assessment & Plan     Your last A1C (3 month average) =   Lab Results   Component Value Date    HGBA1C 6.40 (H) 02/26/2024      Your diabetes is Controlled.  Continue amaryl and tradjenta.     The American Diabetes Association recommends an A1C of less than 7%.  A1C Average Levels  Blood Sugar:   6%  126 mg/dL  7%  154 mg/dL  8%  183 mg/dL  9%  212 mg/dL  10%  240 mg/dL  11%  269 mg/dL  12%  298 mg/dL    Glucose goals for many adults with diabetes  Blood sugar before meals  mg/dL  Blood sugar 1-2 hours after the start of a meal Less than 180 mg/dL A1C Less than 7%    Eye Health:   You need a diabetic eye exam yearly.   Please have a copy of the note faxed to my office.   Fax: [phone]    Foot Health:   You need a diabetic foot exam yearly.   Check your feet routinely for any wounds.   You should always check your shoes for any debris that could cause a wound.             Relevant Orders    Hemoglobin A1c    Microalbumin / Creatinine Urine Ratio - Urine, Clean Catch       Eye    Nonexudative age-related macular degeneration, bilateral, early dry stage       Gastrointestinal Abdominal     History of colon resection / unresectable colon polyp    Overview     S/p sigmoid colectomy.     -Plan for repeat colonoscopy March 2022 per Dr Adams             Genitourinary and Reproductive     Stage 3a chronic kidney disease (Chronic)    Current Assessment & Plan     Lab Results   Component Value Date    CREATININE 1.35 (H) 02/26/2024        Avoid nephrotoxic medications - especially nsaids (like Ibuprofen, aleve, motrin): tylenol is generally safe unless you have been advised not to take.   Follow no added salt diet (goal would be to have less than    Avoid colored sodas (coke/pepsi)    Maintain blood pressure control: goal less than 130/80 to slow progression of CKD.   Maintain blood sugar control     Daily sodium content from all sources should be less than 2 grams or 2000 mg     Some information about reading labels   Understanding the terms:   Sodium Free - Only a trivial amount of sodium per serving.   Very Low Sodium - 35 mg or less per serving.   Low Sodium - 140 mg or less per serving.   Reduced Sodium - Foods in which the level of sodium is reduced by 25%.   Light or Lite in Sodium -  Foods in which the sodium is reduced by at least 50% .   Simple rule of thumb : If salt is listed in the first five ingredients, the item is probably too high in sodium to use.     All food labels have milligrams (mg) of sodium listed. Follow these steps when reading the sodium information on the label:   1. Know how much sodium you are allowed each day.   Remember that there are 1000 milligrams (mg) in 1 gram.  2. Look at the package label. Check the serving size. Nutrition values are expressed per serving.   How does this compare to your total daily allowance? If the sodium level is 500 mg or more per serving, the item is not a good choice.   3. Compare labels of similar products. Select the lowest sodium level for the same serving size.     For additional information, look at the the National Kidney Foundation Internet site at www.kidney.org             Relevant Orders    Comprehensive Metabolic Panel       Hematology and Neoplasia    Carcinoma of prostate (Chronic)    Overview     Formatting of this note might be different from the original. Aug 08, 2019 Entered By: DAVID FONTANA Comment: Proton Therapy          Other Visit Diagnoses       Medicare annual wellness visit, subsequent    -  Primary                      Follow Up     Return in about 6 months (around 1/18/2025).    Patient was given instructions and counseling regarding his condition or for health maintenance advice. Please see specific information pulled into the AVS if appropriate.

## 2024-07-19 LAB
ALBUMIN SERPL-MCNC: 4.7 G/DL (ref 3.7–4.7)
ALBUMIN/CREAT UR: 256 MG/G CREAT (ref 0–29)
ALP SERPL-CCNC: 50 IU/L (ref 44–121)
ALT SERPL-CCNC: 18 IU/L (ref 0–44)
AST SERPL-CCNC: 17 IU/L (ref 0–40)
BILIRUB SERPL-MCNC: 0.4 MG/DL (ref 0–1.2)
BUN SERPL-MCNC: 21 MG/DL (ref 8–27)
BUN/CREAT SERPL: 13 (ref 10–24)
CALCIUM SERPL-MCNC: 9.6 MG/DL (ref 8.6–10.2)
CHLORIDE SERPL-SCNC: 106 MMOL/L (ref 96–106)
CHOLEST SERPL-MCNC: 168 MG/DL (ref 100–199)
CO2 SERPL-SCNC: 26 MMOL/L (ref 20–29)
CREAT SERPL-MCNC: 1.6 MG/DL (ref 0.76–1.27)
CREAT UR-MCNC: 139.9 MG/DL
EGFRCR SERPLBLD CKD-EPI 2021: 41 ML/MIN/1.73
ERYTHROCYTE [DISTWIDTH] IN BLOOD BY AUTOMATED COUNT: 13 % (ref 11.6–15.4)
GLOBULIN SER CALC-MCNC: 2.4 G/DL (ref 1.5–4.5)
GLUCOSE SERPL-MCNC: 141 MG/DL (ref 70–99)
HBA1C MFR BLD: 6.1 % (ref 4.8–5.6)
HCT VFR BLD AUTO: 36.2 % (ref 37.5–51)
HDLC SERPL-MCNC: 78 MG/DL
HGB BLD-MCNC: 10.8 G/DL (ref 13–17.7)
LDLC SERPL CALC-MCNC: 74 MG/DL (ref 0–99)
LDLC/HDLC SERPL: 0.9 RATIO (ref 0–3.6)
MCH RBC QN AUTO: 24.2 PG (ref 26.6–33)
MCHC RBC AUTO-ENTMCNC: 29.8 G/DL (ref 31.5–35.7)
MCV RBC AUTO: 81 FL (ref 79–97)
MICROALBUMIN UR-MCNC: 358.5 UG/ML
PLATELET # BLD AUTO: 267 X10E3/UL (ref 150–450)
POTASSIUM SERPL-SCNC: 4.1 MMOL/L (ref 3.5–5.2)
PROT SERPL-MCNC: 7.1 G/DL (ref 6–8.5)
RBC # BLD AUTO: 4.47 X10E6/UL (ref 4.14–5.8)
SODIUM SERPL-SCNC: 145 MMOL/L (ref 134–144)
TRIGL SERPL-MCNC: 87 MG/DL (ref 0–149)
VLDLC SERPL CALC-MCNC: 16 MG/DL (ref 5–40)
WBC # BLD AUTO: 5.9 X10E3/UL (ref 3.4–10.8)

## 2024-07-22 ENCOUNTER — TELEPHONE (OUTPATIENT)
Dept: INTERNAL MEDICINE | Facility: CLINIC | Age: 89
End: 2024-07-22
Payer: MEDICARE

## 2024-07-22 NOTE — TELEPHONE ENCOUNTER
Left Voice Mail for pt. SEE TELEPHONE NOTE.     ----- Message from Gage Pro sent at 7/22/2024  7:35 AM EDT -----  Please call and notify  Mr. Lira,     Recent lab work   Anemia is stable.   Diabetes has improved: A1C is down to 6.1%        W[facility]

## 2024-07-24 DIAGNOSIS — E11.59 TYPE 2 DIABETES MELLITUS WITH OTHER CIRCULATORY COMPLICATION, WITHOUT LONG-TERM CURRENT USE OF INSULIN: ICD-10-CM

## 2024-07-24 RX ORDER — GLIMEPIRIDE 2 MG/1
2 TABLET ORAL
Qty: 90 TABLET | Refills: 1 | Status: SHIPPED | OUTPATIENT
Start: 2024-07-24

## 2024-08-09 DIAGNOSIS — E78.5 DYSLIPIDEMIA: Chronic | ICD-10-CM

## 2024-08-09 DIAGNOSIS — I25.10 CORONARY ARTERY DISEASE INVOLVING NATIVE CORONARY ARTERY OF NATIVE HEART WITHOUT ANGINA PECTORIS: Chronic | ICD-10-CM

## 2024-08-09 RX ORDER — PITAVASTATIN CALCIUM 2.09 MG/1
2 TABLET, FILM COATED ORAL NIGHTLY
Qty: 90 TABLET | Refills: 1 | Status: SHIPPED | OUTPATIENT
Start: 2024-08-09

## 2024-08-14 DIAGNOSIS — I10 ESSENTIAL HYPERTENSION: Chronic | ICD-10-CM

## 2024-08-15 RX ORDER — LOSARTAN POTASSIUM 50 MG/1
75 TABLET ORAL DAILY
Qty: 135 TABLET | Refills: 1 | Status: SHIPPED | OUTPATIENT
Start: 2024-08-15

## 2024-09-05 DIAGNOSIS — I10 ESSENTIAL HYPERTENSION: Chronic | ICD-10-CM

## 2024-09-05 RX ORDER — METOPROLOL SUCCINATE 50 MG/1
50 TABLET, EXTENDED RELEASE ORAL NIGHTLY
Qty: 90 TABLET | Refills: 1 | Status: SHIPPED | OUTPATIENT
Start: 2024-09-05

## 2024-09-16 ENCOUNTER — OFFICE VISIT (OUTPATIENT)
Dept: INTERNAL MEDICINE | Facility: CLINIC | Age: 89
End: 2024-09-16
Payer: MEDICARE

## 2024-09-16 VITALS
BODY MASS INDEX: 22.58 KG/M2 | SYSTOLIC BLOOD PRESSURE: 130 MMHG | OXYGEN SATURATION: 99 % | WEIGHT: 149 LBS | HEART RATE: 84 BPM | DIASTOLIC BLOOD PRESSURE: 82 MMHG | HEIGHT: 68 IN

## 2024-09-16 DIAGNOSIS — K62.5 BRBPR (BRIGHT RED BLOOD PER RECTUM): Primary | ICD-10-CM

## 2024-09-16 DIAGNOSIS — Z90.49 HISTORY OF COLON RESECTION: ICD-10-CM

## 2024-09-16 LAB — HEMOCCULT STL QL IA: NEGATIVE

## 2024-09-16 PROCEDURE — 1126F AMNT PAIN NOTED NONE PRSNT: CPT | Performed by: NURSE PRACTITIONER

## 2024-09-16 PROCEDURE — 1160F RVW MEDS BY RX/DR IN RCRD: CPT | Performed by: NURSE PRACTITIONER

## 2024-09-16 PROCEDURE — 82274 ASSAY TEST FOR BLOOD FECAL: CPT | Performed by: NURSE PRACTITIONER

## 2024-09-16 PROCEDURE — 1159F MED LIST DOCD IN RCRD: CPT | Performed by: NURSE PRACTITIONER

## 2024-09-16 PROCEDURE — 99213 OFFICE O/P EST LOW 20 MIN: CPT | Performed by: NURSE PRACTITIONER

## 2024-09-17 LAB
BUN SERPL-MCNC: 18 MG/DL (ref 8–23)
BUN/CREAT SERPL: 12.4 (ref 7–25)
CALCIUM SERPL-MCNC: 9.3 MG/DL (ref 8.6–10.5)
CHLORIDE SERPL-SCNC: 105 MMOL/L (ref 98–107)
CO2 SERPL-SCNC: 28.5 MMOL/L (ref 22–29)
CREAT SERPL-MCNC: 1.45 MG/DL (ref 0.76–1.27)
EGFRCR SERPLBLD CKD-EPI 2021: 46.1 ML/MIN/1.73
GLUCOSE SERPL-MCNC: 108 MG/DL (ref 65–99)
HCT VFR BLD AUTO: 35.2 % (ref 37.5–51)
HGB BLD-MCNC: 10.8 G/DL (ref 13–17.7)
POTASSIUM SERPL-SCNC: 4.2 MMOL/L (ref 3.5–5.2)
SODIUM SERPL-SCNC: 144 MMOL/L (ref 136–145)

## 2024-09-20 ENCOUNTER — TELEPHONE (OUTPATIENT)
Dept: INTERNAL MEDICINE | Facility: CLINIC | Age: 89
End: 2024-09-20
Payer: MEDICARE

## 2024-09-24 DIAGNOSIS — I10 ESSENTIAL HYPERTENSION: Chronic | ICD-10-CM

## 2024-09-24 DIAGNOSIS — E78.5 DYSLIPIDEMIA: Chronic | ICD-10-CM

## 2024-09-24 DIAGNOSIS — I25.10 CORONARY ARTERY DISEASE INVOLVING NATIVE CORONARY ARTERY OF NATIVE HEART WITHOUT ANGINA PECTORIS: Chronic | ICD-10-CM

## 2024-09-24 RX ORDER — PITAVASTATIN CALCIUM 2.09 MG/1
2 TABLET, FILM COATED ORAL NIGHTLY
Qty: 90 TABLET | Refills: 1 | Status: SHIPPED | OUTPATIENT
Start: 2024-09-24

## 2024-09-24 RX ORDER — HYDRALAZINE HYDROCHLORIDE 25 MG/1
25 TABLET, FILM COATED ORAL 2 TIMES DAILY
Qty: 180 TABLET | Refills: 1 | Status: SHIPPED | OUTPATIENT
Start: 2024-09-24

## 2024-09-25 ENCOUNTER — OFFICE VISIT (OUTPATIENT)
Dept: SURGERY | Facility: CLINIC | Age: 89
End: 2024-09-25
Payer: MEDICARE

## 2024-09-25 VITALS
SYSTOLIC BLOOD PRESSURE: 160 MMHG | BODY MASS INDEX: 22.55 KG/M2 | HEIGHT: 68 IN | WEIGHT: 148.8 LBS | DIASTOLIC BLOOD PRESSURE: 90 MMHG

## 2024-09-25 DIAGNOSIS — K62.5 RECTAL BLEED: Primary | ICD-10-CM

## 2024-09-25 PROCEDURE — 1160F RVW MEDS BY RX/DR IN RCRD: CPT | Performed by: SURGERY

## 2024-09-25 PROCEDURE — 1159F MED LIST DOCD IN RCRD: CPT | Performed by: SURGERY

## 2024-09-25 PROCEDURE — 99203 OFFICE O/P NEW LOW 30 MIN: CPT | Performed by: SURGERY

## 2024-09-25 RX ORDER — HYDROCORTISONE 25 MG/G
CREAM TOPICAL
Qty: 30 G | Refills: 1 | Status: SHIPPED | OUTPATIENT
Start: 2024-09-25 | End: 2024-10-09

## 2024-10-17 DIAGNOSIS — R35.0 URINE FREQUENCY: ICD-10-CM

## 2024-10-17 DIAGNOSIS — E78.2 MIXED HYPERLIPIDEMIA: ICD-10-CM

## 2024-10-17 DIAGNOSIS — I10 HYPERTENSION, ESSENTIAL: ICD-10-CM

## 2024-10-17 DIAGNOSIS — E11.9 DIABETES MELLITUS WITHOUT COMPLICATION: ICD-10-CM

## 2024-10-17 RX ORDER — EZETIMIBE 10 MG/1
10 TABLET ORAL DAILY
Qty: 90 TABLET | Refills: 3 | Status: SHIPPED | OUTPATIENT
Start: 2024-10-17

## 2024-11-04 ENCOUNTER — OFFICE VISIT (OUTPATIENT)
Dept: SURGERY | Facility: CLINIC | Age: 89
End: 2024-11-04
Payer: MEDICARE

## 2024-11-04 VITALS
OXYGEN SATURATION: 96 % | WEIGHT: 147 LBS | BODY MASS INDEX: 22.28 KG/M2 | DIASTOLIC BLOOD PRESSURE: 82 MMHG | HEART RATE: 67 BPM | HEIGHT: 68 IN | SYSTOLIC BLOOD PRESSURE: 160 MMHG

## 2024-11-04 DIAGNOSIS — K62.5 RECTAL BLEED: Primary | ICD-10-CM

## 2024-11-04 PROCEDURE — 99212 OFFICE O/P EST SF 10 MIN: CPT | Performed by: SURGERY

## 2024-11-04 PROCEDURE — 1160F RVW MEDS BY RX/DR IN RCRD: CPT | Performed by: SURGERY

## 2024-11-04 PROCEDURE — 1159F MED LIST DOCD IN RCRD: CPT | Performed by: SURGERY

## 2024-11-04 NOTE — PROGRESS NOTES
Chief complaint: Follow-up rectal bleeding    Subjective: 89-year-old gentleman with past history of colon resection presenting for follow-up of rectal bleeding.  At last visit he was seeing some intermittent bleeding.  We placed him on stool softeners and fiber and since that time he has has experienced no further bleeding.  No abdominal pain, no pain with passage of stools and bowels are functioning normally.    Review of systems:  Constitutional: Denies fever, chills, change in weight  Respiratory: Denies cough or wheezing    Physical exam:  22.3  General: Awake and alert, no distress  Head: Normocephalic, atraumatic  Eyes: Extraocular movements intact, no icterus  Neck: Supple, trachea midline  Respiratory: No use of accessory muscles, good bilateral chest expansion  Gastrointestinal: Soft and benign without mass or hernia  Extremities: No peripheral edema, no deformity  Skin: Warm and dry      Assessment and plan:  -Rectal bleeding, most likely hemorrhoidal in nature  -Last colonoscopy was done in 2020.  He has had a prior laparoscopic sigmoid resection.  -He is not interested in endoscopic surveillance and I think that is reasonable given the fact that he is not experiencing any further bleeding  -He may continue his aspirin, he may follow-up here as needed    Darek Adams MD  General and Endoscopic Surgery  [facility]    [address], [address]  [place], [place], [address]  P: [phone]  F: [phone]    BMI is within normal parameters. No other follow-up for BMI required.

## 2025-01-22 ENCOUNTER — OFFICE VISIT (OUTPATIENT)
Dept: INTERNAL MEDICINE | Facility: CLINIC | Age: OVER 89
End: 2025-01-22
Payer: MEDICARE

## 2025-01-22 VITALS
HEIGHT: 68 IN | OXYGEN SATURATION: 100 % | BODY MASS INDEX: 21.95 KG/M2 | HEART RATE: 68 BPM | WEIGHT: 144.8 LBS | SYSTOLIC BLOOD PRESSURE: 130 MMHG | DIASTOLIC BLOOD PRESSURE: 80 MMHG

## 2025-01-22 DIAGNOSIS — E11.59 TYPE 2 DIABETES MELLITUS WITH OTHER CIRCULATORY COMPLICATION, WITHOUT LONG-TERM CURRENT USE OF INSULIN: Chronic | ICD-10-CM

## 2025-01-22 DIAGNOSIS — I10 ESSENTIAL HYPERTENSION: Primary | Chronic | ICD-10-CM

## 2025-01-22 DIAGNOSIS — I25.10 CORONARY ARTERY DISEASE INVOLVING NATIVE CORONARY ARTERY OF NATIVE HEART WITHOUT ANGINA PECTORIS: Chronic | ICD-10-CM

## 2025-01-22 DIAGNOSIS — E11.9 DIABETES MELLITUS WITHOUT COMPLICATION: ICD-10-CM

## 2025-01-22 DIAGNOSIS — Z23 NEED FOR VACCINATION: ICD-10-CM

## 2025-01-22 DIAGNOSIS — E78.2 MIXED HYPERLIPIDEMIA: ICD-10-CM

## 2025-01-22 DIAGNOSIS — I10 HYPERTENSION, ESSENTIAL: ICD-10-CM

## 2025-01-22 PROCEDURE — 99214 OFFICE O/P EST MOD 30 MIN: CPT | Performed by: NURSE PRACTITIONER

## 2025-01-22 PROCEDURE — G2211 COMPLEX E/M VISIT ADD ON: HCPCS | Performed by: NURSE PRACTITIONER

## 2025-01-22 PROCEDURE — 1160F RVW MEDS BY RX/DR IN RCRD: CPT | Performed by: NURSE PRACTITIONER

## 2025-01-22 PROCEDURE — 1159F MED LIST DOCD IN RCRD: CPT | Performed by: NURSE PRACTITIONER

## 2025-01-22 PROCEDURE — 1126F AMNT PAIN NOTED NONE PRSNT: CPT | Performed by: NURSE PRACTITIONER

## 2025-01-22 RX ORDER — AMLODIPINE BESYLATE 10 MG/1
10 TABLET ORAL DAILY
Qty: 90 TABLET | Refills: 1 | Status: SHIPPED | OUTPATIENT
Start: 2025-01-22

## 2025-01-22 RX ORDER — METOPROLOL SUCCINATE 50 MG/1
50 TABLET, EXTENDED RELEASE ORAL NIGHTLY
Qty: 90 TABLET | Refills: 1 | Status: SHIPPED | OUTPATIENT
Start: 2025-01-22

## 2025-01-22 RX ORDER — LOSARTAN POTASSIUM 50 MG/1
75 TABLET ORAL DAILY
Qty: 135 TABLET | Refills: 1 | Status: SHIPPED | OUTPATIENT
Start: 2025-01-22

## 2025-01-22 RX ORDER — HYDRALAZINE HYDROCHLORIDE 25 MG/1
25 TABLET, FILM COATED ORAL 2 TIMES DAILY
Qty: 180 TABLET | Refills: 1 | Status: SHIPPED | OUTPATIENT
Start: 2025-01-22

## 2025-01-22 RX ORDER — GLIMEPIRIDE 2 MG/1
2 TABLET ORAL
Qty: 90 TABLET | Refills: 1 | Status: SHIPPED | OUTPATIENT
Start: 2025-01-22

## 2025-01-22 NOTE — ASSESSMENT & PLAN NOTE
Continue livalo, zetia and low fat diet.     Lab Results   Component Value Date    CHOL 159 11/24/2020    CHLPL 168 07/18/2024    TRIG 87 07/18/2024    HDL 78 07/18/2024    LDL 74 07/18/2024

## 2025-01-22 NOTE — ASSESSMENT & PLAN NOTE
Your last A1C (3 month average) =   Lab Results   Component Value Date    HGBA1C 6.1 (H) 07/18/2024      Your diabetes is Controlled.    Plan    [x] Continue same treatment     [x] Will modify dose/ treatment if needed based upon lab results today.     [] Change:       Diet: low carbohydrate, low sugar.     ADA Website for diabetic food choices.   https://diabetes.org/food-nutrition/eating-healthy    The American Diabetes Association recommends an A1C of less than 7%.  A1C Average Levels Blood Sugar:   6%  126 mg/dL  7%  154 mg/dL  8%  183 mg/dL  9%  212 mg/dL  10%  240 mg/dL  11%  269 mg/dL  12%  298 mg/dL    Glucose goals for many adults with diabetes  Blood sugar before meals  mg/dL  Blood sugar 1-2 hours after the start of a meal Less than 180 mg/dL A1C Less than 7%    Eye Health:   You need a diabetic eye exam yearly.   Please have a copy of the note faxed to my office.   Fax: [phone]    Foot Health:   You need a diabetic foot exam yearly.   Check your feet routinely for any wounds.   You should always check your shoes for any debris that could cause a wound.

## 2025-01-22 NOTE — PROGRESS NOTES
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         Hypertension  This is a chronic problem. The problem is controlled. Treatments tried: norvasc, hydralazine, toprol, losartan. Hypertensive end-organ damage includes CAD/MI.   Hyperlipidemia  This is a chronic problem. The current episode started more than 1 year ago. The problem is controlled. Current antihyperlipidemic treatment includes statins and ezetimibe. Risk factors: Known CAD - being treated.   Diabetes  He presents for his follow-up diabetic visit. He has type 2 diabetes mellitus. Current diabetic treatments: amaryl, tradjenta.     Here with wife today - no new issues.     Vaccine requested today : Flu    I have reviewed patient's medical history, any new submitted information provided by patient or medical assistant and updated medical record.      Objective:      Physical Exam  HENT:      Ears:      Comments: Hearing aids   Cardiovascular:      Rate and Rhythm: Normal rate and regular rhythm.      Pulses: Normal pulses.      Heart sounds: Normal heart sounds.   Pulmonary:      Breath sounds: Normal breath sounds.        Result Review  Data Reviewed:{ Labs  Result Review  Imaging  Med Tab  Media :23}     The following data was reviewed by: Gage Pro III, NP-C on 01/22/2025  Common labs          2/26/2024    09:59 7/18/2024    11:19 9/16/2024    12:18   Common Labs   Glucose 123  141  108    BUN 17  21  18    Creatinine 1.35  1.60   "1.45    Sodium 142  145  144    Potassium 4.3  4.1  4.2    Chloride 104  106  105    Calcium 8.9  9.6  9.3    Total Protein  7.1     Albumin  4.7     Total Bilirubin  0.4     Alkaline Phosphatase  50     AST (SGOT)  17     ALT (SGPT)  18     WBC 6.81  5.9     Hemoglobin 10.5  10.8  10.8    Hematocrit 34.9  36.2  35.2    Platelets 275  267     Total Cholesterol  168     Triglycerides  87     HDL Cholesterol  78     LDL Cholesterol   74     Hemoglobin A1C 6.40  6.1     Microalbumin, Urine  358.5              Vital Signs:   /80 (BP Location: Left arm, Patient Position: Sitting, Cuff Size: Adult)   Pulse 68   Ht 172.7 cm (68\")   Wt 65.7 kg (144 lb 12.8 oz)   SpO2 100%   BMI 22.02 kg/m²   Estimated body mass index is 22.02 kg/m² as calculated from the following:    Height as of this encounter: 172.7 cm (68\").    Weight as of this encounter: 65.7 kg (144 lb 12.8 oz).        Requested Prescriptions     Signed Prescriptions Disp Refills    glimepiride (AMARYL) 2 MG tablet 90 tablet 1     Sig: Take 1 tablet by mouth Every Morning Before Breakfast.    hydrALAZINE (APRESOLINE) 25 MG tablet 180 tablet 1     Sig: Take 1 tablet by mouth 2 (Two) Times a Day.    losartan (COZAAR) 50 MG tablet 135 tablet 1     Sig: Take 1.5 tablets by mouth Daily.    metoprolol succinate XL (TOPROL-XL) 50 MG 24 hr tablet 90 tablet 1     Sig: Take 1 tablet by mouth Every Night.    amLODIPine (NORVASC) 10 MG tablet 90 tablet 1     Sig: Take 1 tablet by mouth Daily.       Routine medications provided by this office will also be refilled via pharmacy request.       Current Outpatient Medications:     ALBUTEROL SULFATE  (90 Base) MCG/ACT inhaler, INHALE 2 PUFFS BY MOUTH EVERY 4 HOURS AS NEEDED, Disp: 42.5 g, Rfl: 0    amLODIPine (NORVASC) 10 MG tablet, Take 1 tablet by mouth Daily., Disp: 90 tablet, Rfl: 1    aspirin (aspirin) 81 MG EC tablet, Take 1 tablet by mouth Daily., Disp: , Rfl:     ezetimibe (ZETIA) 10 MG tablet, TAKE 1 TABLET " BY MOUTH DAILY, Disp: 90 tablet, Rfl: 3    glimepiride (AMARYL) 2 MG tablet, Take 1 tablet by mouth Every Morning Before Breakfast., Disp: 90 tablet, Rfl: 1    hydrALAZINE (APRESOLINE) 25 MG tablet, Take 1 tablet by mouth 2 (Two) Times a Day., Disp: 180 tablet, Rfl: 1    linagliptin (Tradjenta) 5 MG tablet tablet, TAKE 1 TABLET BY MOUTH DAILY, Disp: 90 tablet, Rfl: 2    losartan (COZAAR) 50 MG tablet, Take 1.5 tablets by mouth Daily., Disp: 135 tablet, Rfl: 1    metoprolol succinate XL (TOPROL-XL) 50 MG 24 hr tablet, Take 1 tablet by mouth Every Night., Disp: 90 tablet, Rfl: 1    Omega-3 Fatty Acids (FISH OIL) 1200 MG capsule capsule, Take 1 capsule by mouth Every Night. HELD FOR OR, Disp: , Rfl:     pitavastatin calcium (LIVALO) 2 MG tablet tablet, TAKE 1 TABLET BY MOUTH EVERY NIGHT, Disp: 90 tablet, Rfl: 1    tadalafil (CIALIS) 20 MG tablet, Take 1 tablet by mouth Daily., Disp: , Rfl:      Assessment and Plan:      Assessment and Plan {CC Problem List  Visit Diagnosis  ROS  Review (Popup)  Health Maintenance  Quality  BestPractice  Medications  SmartSets  SnapShot Encounters  Media :23}     Diagnoses and all orders for this visit:    1. Essential hypertension (Primary)  Assessment & Plan:  Hypertension is improving with treatment.  Continue current treatment regimen.  Continue current medications.  Blood pressure will be reassessed at the next regular appointment.    Continue norvasc, losartan, toprol, hydralazine.    Continue low sodium diet.     Orders:  -     Hemoglobin and hematocrit, blood  -     hydrALAZINE (APRESOLINE) 25 MG tablet; Take 1 tablet by mouth 2 (Two) Times a Day.  Dispense: 180 tablet; Refill: 1  -     losartan (COZAAR) 50 MG tablet; Take 1.5 tablets by mouth Daily.  Dispense: 135 tablet; Refill: 1  -     metoprolol succinate XL (TOPROL-XL) 50 MG 24 hr tablet; Take 1 tablet by mouth Every Night.  Dispense: 90 tablet; Refill: 1    2. Mixed hyperlipidemia  Assessment & Plan:  Continue  livalo, zetia and low fat diet.     Lab Results   Component Value Date    CHOL 159 11/24/2020    CHLPL 168 07/18/2024    TRIG 87 07/18/2024    HDL 78 07/18/2024    LDL 74 07/18/2024        Orders:  -     amLODIPine (NORVASC) 10 MG tablet; Take 1 tablet by mouth Daily.  Dispense: 90 tablet; Refill: 1    3. Coronary artery disease involving native coronary artery of native heart without angina pectoris  Overview:  Continue ASA and statin       4. Type 2 diabetes mellitus with other circulatory complication, without long-term current use of insulin  Assessment & Plan:  Your last A1C (3 month average) =   Lab Results   Component Value Date    HGBA1C 6.1 (H) 07/18/2024      Your diabetes is Controlled.    Plan    [x] Continue same treatment     [x] Will modify dose/ treatment if needed based upon lab results today.     [] Change:       Diet: low carbohydrate, low sugar.     ADA Website for diabetic food choices.   https://diabetes.org/food-nutrition/eating-healthy    The American Diabetes Association recommends an A1C of less than 7%.  A1C Average Levels Blood Sugar:   6%  126 mg/dL  7%  154 mg/dL  8%  183 mg/dL  9%  212 mg/dL  10%  240 mg/dL  11%  269 mg/dL  12%  298 mg/dL    Glucose goals for many adults with diabetes  Blood sugar before meals  mg/dL  Blood sugar 1-2 hours after the start of a meal Less than 180 mg/dL A1C Less than 7%    Eye Health:   You need a diabetic eye exam yearly.   Please have a copy of the note faxed to my office.   Fax: [phone]    Foot Health:   You need a diabetic foot exam yearly.   Check your feet routinely for any wounds.   You should always check your shoes for any debris that could cause a wound.        Orders:  -     Basic Metabolic Panel  -     Hemoglobin A1c  -     Microalbumin / Creatinine Urine Ratio - Urine, Clean Catch  -     glimepiride (AMARYL) 2 MG tablet; Take 1 tablet by mouth Every Morning Before Breakfast.  Dispense: 90 tablet; Refill: 1    5. Diabetes mellitus  without complication  -     amLODIPine (NORVASC) 10 MG tablet; Take 1 tablet by mouth Daily.  Dispense: 90 tablet; Refill: 1    6. Hypertension, essential  -     amLODIPine (NORVASC) 10 MG tablet; Take 1 tablet by mouth Daily.  Dispense: 90 tablet; Refill: 1    7. Need for vaccination  -     Fluzone High-Dose 65+yrs ([id number])             New Medications Ordered This Visit   Medications    glimepiride (AMARYL) 2 MG tablet     Sig: Take 1 tablet by mouth Every Morning Before Breakfast.     Dispense:  90 tablet     Refill:  1    hydrALAZINE (APRESOLINE) 25 MG tablet     Sig: Take 1 tablet by mouth 2 (Two) Times a Day.     Dispense:  180 tablet     Refill:  1    losartan (COZAAR) 50 MG tablet     Sig: Take 1.5 tablets by mouth Daily.     Dispense:  135 tablet     Refill:  1     **Patient requests 90 days supply**    metoprolol succinate XL (TOPROL-XL) 50 MG 24 hr tablet     Sig: Take 1 tablet by mouth Every Night.     Dispense:  90 tablet     Refill:  1    amLODIPine (NORVASC) 10 MG tablet     Sig: Take 1 tablet by mouth Daily.     Dispense:  90 tablet     Refill:  1       Additionally, I am providing longitudinal care which includes continuously being a focal point for all of the patient's health care services and ongoing medical care related to hypertension, coronary heart disease, diabetes mellitus, and dyslipidemias as documented above.            Follow Up {Instructions Charge Capture  Follow-up Communications :23}     Return in about 6 months (around 7/22/2025) for Medicare Wellness.      Patient was given instructions and counseling regarding his condition or for health maintenance advice. Please see specific information pulled into the AVS if appropriate.    Dragon disclaimer:   Much of this encounter note is an electronic transcription/translation of spoken language to printed text. The electronic translation of spoken language may permit erroneous, or at times, nonsensical words or phrases to be  inadvertently transcribed; Although I have reviewed the note for such errors, some may still exist.     Additional Patient Counseling:       There are no Patient Instructions on file for this visit.

## 2025-01-23 LAB
ALBUMIN/CREAT UR: 462 MG/G CREAT (ref 0–29)
BUN SERPL-MCNC: 21 MG/DL (ref 8–27)
BUN/CREAT SERPL: 13 (ref 10–24)
CALCIUM SERPL-MCNC: 9.5 MG/DL (ref 8.6–10.2)
CHLORIDE SERPL-SCNC: 104 MMOL/L (ref 96–106)
CO2 SERPL-SCNC: 24 MMOL/L (ref 20–29)
CREAT SERPL-MCNC: 1.59 MG/DL (ref 0.76–1.27)
CREAT UR-MCNC: 130.6 MG/DL
EGFRCR SERPLBLD CKD-EPI 2021: 41 ML/MIN/1.73
GLUCOSE SERPL-MCNC: 120 MG/DL (ref 70–99)
HBA1C MFR BLD: 5.7 % (ref 4.8–5.6)
HCT VFR BLD AUTO: 36.9 % (ref 37.5–51)
HGB BLD-MCNC: 11.1 G/DL (ref 13–17.7)
MICROALBUMIN UR-MCNC: 603.5 UG/ML
POTASSIUM SERPL-SCNC: 4.4 MMOL/L (ref 3.5–5.2)
SODIUM SERPL-SCNC: 143 MMOL/L (ref 134–144)

## 2025-01-23 PROCEDURE — 90662 IIV NO PRSV INCREASED AG IM: CPT | Performed by: NURSE PRACTITIONER

## 2025-01-23 PROCEDURE — G0008 ADMIN INFLUENZA VIRUS VAC: HCPCS | Performed by: NURSE PRACTITIONER

## 2025-01-28 ENCOUNTER — TELEPHONE (OUTPATIENT)
Dept: INTERNAL MEDICINE | Facility: CLINIC | Age: OVER 89
End: 2025-01-28
Payer: MEDICARE

## 2025-01-28 NOTE — TELEPHONE ENCOUNTER
RELAY     Please call and notify  Mr. Lira,     Recent lab work is normal except:     Kidney function is stable.   Diabetes is much better - A1c down to 5.7%     No changes needed.     WCN

## 2025-01-30 DIAGNOSIS — I10 HYPERTENSION, ESSENTIAL: ICD-10-CM

## 2025-01-30 DIAGNOSIS — R35.0 URINE FREQUENCY: ICD-10-CM

## 2025-01-30 DIAGNOSIS — E11.9 DIABETES MELLITUS WITHOUT COMPLICATION: ICD-10-CM

## 2025-01-30 DIAGNOSIS — E78.2 MIXED HYPERLIPIDEMIA: ICD-10-CM

## 2025-01-31 RX ORDER — LINAGLIPTIN 5 MG/1
5 TABLET, FILM COATED ORAL DAILY
Qty: 90 TABLET | Refills: 2 | Status: SHIPPED | OUTPATIENT
Start: 2025-01-31

## 2025-01-31 NOTE — TELEPHONE ENCOUNTER
Rx Refill Note  Requested Prescriptions     Pending Prescriptions Disp Refills    Tradjenta 5 MG tablet tablet [Pharmacy Med Name: TRADJENTA 5MG TABLETS] 90 tablet 2     Sig: TAKE 1 TABLET BY MOUTH DAILY      Last office visit with prescribing clinician: 1/22/2025  Next office visit with prescribing clinician: 7/25/2025         Radha Grant MA  01/31/25, 08:53 EST

## 2025-03-11 NOTE — TELEPHONE ENCOUNTER
Rx Refill Note  Requested Prescriptions     Pending Prescriptions Disp Refills    tadalafil (CIALIS) 20 MG tablet [Pharmacy Med Name: TADALAFIL 20MG TABLETS] 9 tablet      Sig: TAKE 1 TABLET BY MOUTH DAILY      Last office visit with prescribing clinician: 1/22/2025  Next office visit with prescribing clinician: 3/10/2025         Radha Grant MA  03/11/25, 14:54 EDT

## 2025-03-12 RX ORDER — TADALAFIL 20 MG/1
TABLET ORAL DAILY
Qty: 9 TABLET | Refills: 0 | Status: SHIPPED | OUTPATIENT
Start: 2025-03-12

## 2025-03-13 RX ORDER — TADALAFIL 20 MG/1
TABLET ORAL DAILY
Qty: 9 TABLET | OUTPATIENT
Start: 2025-03-13

## 2025-07-01 DIAGNOSIS — I10 ESSENTIAL HYPERTENSION: Chronic | ICD-10-CM

## 2025-07-02 RX ORDER — LOSARTAN POTASSIUM 50 MG/1
75 TABLET ORAL DAILY
Qty: 135 TABLET | Refills: 1 | Status: SHIPPED | OUTPATIENT
Start: 2025-07-02

## 2025-07-02 NOTE — TELEPHONE ENCOUNTER
Rx Refill Note  Requested Prescriptions     Pending Prescriptions Disp Refills    losartan (COZAAR) 50 MG tablet [Pharmacy Med Name: LOSARTAN 50MG TABLETS] 135 tablet 1     Sig: TAKE 1 AND 1/2 TABLETS BY MOUTH DAILY      Last office visit with prescribing clinician: 1/22/2025  Next office visit with prescribing clinician: 7/25/2025         Radha Grant MA  07/02/25, 07:46 EDT

## 2025-07-25 ENCOUNTER — OFFICE VISIT (OUTPATIENT)
Dept: INTERNAL MEDICINE | Facility: CLINIC | Age: OVER 89
End: 2025-07-25
Payer: MEDICARE

## 2025-07-25 VITALS
WEIGHT: 145.6 LBS | HEART RATE: 59 BPM | SYSTOLIC BLOOD PRESSURE: 122 MMHG | OXYGEN SATURATION: 98 % | HEIGHT: 68 IN | BODY MASS INDEX: 22.07 KG/M2 | DIASTOLIC BLOOD PRESSURE: 70 MMHG

## 2025-07-25 DIAGNOSIS — I25.10 CORONARY ARTERY DISEASE INVOLVING NATIVE CORONARY ARTERY OF NATIVE HEART WITHOUT ANGINA PECTORIS: Chronic | ICD-10-CM

## 2025-07-25 DIAGNOSIS — N18.31 STAGE 3A CHRONIC KIDNEY DISEASE: Chronic | ICD-10-CM

## 2025-07-25 DIAGNOSIS — E78.2 MIXED HYPERLIPIDEMIA: Chronic | ICD-10-CM

## 2025-07-25 DIAGNOSIS — E11.59 TYPE 2 DIABETES MELLITUS WITH OTHER CIRCULATORY COMPLICATION, WITHOUT LONG-TERM CURRENT USE OF INSULIN: Chronic | ICD-10-CM

## 2025-07-25 DIAGNOSIS — I10 ESSENTIAL HYPERTENSION: Primary | Chronic | ICD-10-CM

## 2025-07-25 DIAGNOSIS — H90.6 MIXED CONDUCTIVE AND SENSORINEURAL HEARING LOSS OF BOTH EARS: Chronic | ICD-10-CM

## 2025-07-25 NOTE — PATIENT INSTRUCTIONS
Medicare Wellness  Personal Prevention Plan of Service     Date of Office Visit:    Encounter Provider:  Gage Pro III, NP-C  Place of Service:  [facility]  Patient Name: Gera Lira  :  1935    As part of the Medicare Wellness portion of your visit today, we are providing you with this personalized preventive plan of services (PPPS). This plan is based upon recommendations of the [place] Preventive Services Task Force (USPSTF) and the Advisory Committee on Immunization Practices (ACIP).    This lists the preventive care services that should be considered, and provides dates of when you are due. Items listed as completed are up-to-date and do not require any further intervention.    Health Maintenance   Topic Date Due    DIABETIC EYE EXAM  Never done    ZOSTER VACCINE (1 of 2) Never done    COVID-19 Vaccine (10 - 2024-25 season) 2025    ANNUAL WELLNESS VISIT  2025    LIPID PANEL  2025    HEMOGLOBIN A1C  2025    INFLUENZA VACCINE  10/01/2025    URINE MICROALBUMIN-CREATININE RATIO (uACR)  2026    DIABETIC FOOT EXAM  2026    RSV Vaccine - Adults  Completed    Pneumococcal Vaccine 50+  Completed    TDAP/TD VACCINES  Discontinued       No orders of the defined types were placed in this encounter.      Return in about 6 months (around 2026).

## 2025-07-25 NOTE — PROGRESS NOTES
The ABCs of the Annual Wellness Visit  Subsequent Medicare Wellness Visit    Subjective    Gera Lira is a 89 y.o. male who presents for a Subsequent Medicare Wellness Visit.    The following portions of the patient's history were reviewed and   updated as appropriate: allergies, current medications, past family history, past medical history, past social history, past surgical history, and problem list.    Wt Readings from Last 4 Encounters:   07/25/25 66 kg (145 lb 9.6 oz)   01/22/25 65.7 kg (144 lb 12.8 oz)   11/04/24 66.7 kg (147 lb)   09/25/24 67.5 kg (148 lb 12.8 oz)       Weight trend is stable.    His cardiovascular risks are:    [] No Known risk factors  [] Known CAD and being treated     [x] Hypertension    [x] Hyperlipidemia  [x] Diabetes     [] Obesity  [] Family history    [] Current or hx tobacco use  [] Sedentary lifestyle       Male:   [] Testosterone use     Mobility    [x] Ambulates independently  [] Ambulates with cane   [] Ambulates with quad cane  [] Ambulates with rollator   [] Ambulates with walker   [] Mobile with wheelchair   [] Mobile with electric wheelchair     Continence    [x] Continent of bowel and bladder  [] Incontinent bowel and bladder   [] Incontinent bladder   [] Incontinent bowel      Social   : wife with him today     Compared to one year ago, the patient feels his physical   health is the same.    Compared to one year ago, the patient feels his mental   health is the same.    Recent Hospitalizations:  He was not admitted to the hospital during the last year.       Current Medical Providers:  Patient Care Team:  Gage Pro III NP-C as PCP - General (Family Medicine)  Raj Vidales MD (Inactive) as Consulting Physician (Cardiology)  Terrell Obrien MD as Consulting Physician (Urology)    Outpatient Medications Prior to Visit   Medication Sig Dispense Refill    amLODIPine (NORVASC) 10 MG tablet Take 1 tablet by mouth Daily. 90 tablet 1     aspirin (aspirin) 81 MG EC tablet Take 1 tablet by mouth Daily.      ezetimibe (ZETIA) 10 MG tablet TAKE 1 TABLET BY MOUTH DAILY 90 tablet 3    glimepiride (AMARYL) 2 MG tablet Take 1 tablet by mouth Every Morning Before Breakfast. 90 tablet 1    hydrALAZINE (APRESOLINE) 25 MG tablet Take 1 tablet by mouth 2 (Two) Times a Day. 180 tablet 1    losartan (COZAAR) 50 MG tablet TAKE 1 AND 1/2 TABLETS BY MOUTH DAILY 135 tablet 1    metoprolol succinate XL (TOPROL-XL) 50 MG 24 hr tablet Take 1 tablet by mouth Every Night. 90 tablet 1    pitavastatin calcium (LIVALO) 2 MG tablet tablet TAKE 1 TABLET BY MOUTH EVERY NIGHT 90 tablet 1    tadalafil (CIALIS) 20 MG tablet TAKE 1 TABLET BY MOUTH DAILY 9 tablet 0    Tradjenta 5 MG tablet tablet TAKE 1 TABLET BY MOUTH DAILY 90 tablet 2    Omega-3 Fatty Acids (FISH OIL) 1200 MG capsule capsule Take 1 capsule by mouth Every Night. HELD FOR OR (Patient not taking: Reported on 7/25/2025)      ALBUTEROL SULFATE  (90 Base) MCG/ACT inhaler INHALE 2 PUFFS BY MOUTH EVERY 4 HOURS AS NEEDED (Patient not taking: Reported on 7/25/2025) 42.5 g 0    RSV Pre-Fusion F A&B Vac Rcmb (Abrysvo) 120 MCG/0.5ML reconstituted solution injection Inject 0.5 mL into the appropriate muscle as directed by prescriber. (Patient not taking: Reported on 7/25/2025) 0.5 mL 0     No facility-administered medications prior to visit.       No opioid medication identified on active medication list. I have reviewed chart for other potential  high risk medication/s and harmful drug interactions in the elderly.        Aspirin is on active medication list. Aspirin use is indicated based on review of current medical condition/s. Pros and cons of this therapy have been discussed today. Benefits of this medication outweigh potential harm.  Patient has been encouraged to continue taking this medication.  .      Patient Active Problem List   Diagnosis    Right carotid bruit    Coronary artery disease    Diabetes mellitus     "Essential hypertension    Stenosis of right carotid artery    Hx of radiation therapy    Stage 3a chronic kidney disease    Colonic mass    Mixed conductive and sensorineural hearing loss of both ears    Erectile dysfunction    History of colon resection / unresectable colon polyp    Unspecified hyperplasia of prostate without urinary obstruction and other lower urinary tract symptoms (LUTS)    Presence of aortocoronary bypass graft    Other specified postprocedural states    Other retention of urine    Nonexudative age-related macular degeneration, bilateral, early dry stage    Hyperlipidemia    History of bilateral cataract extraction    Encounter for fitting and adjustment of hearing aid    Corn or callus    Conductive hearing loss, unspecified    Carcinoma of prostate    Anemia     Advance Care Planning  (Click this link to access ACP Navigator)      Advance Directive is not on file.  ACP discussion was held with the patient during this visit. Patient does not have an advance directive, information provided.     Objective    Vitals:    07/25/25 1108   BP: 122/70   BP Location: Left leg   Patient Position: Sitting   Cuff Size: Adult   Pulse: 59   SpO2: 98%   Weight: 66 kg (145 lb 9.6 oz)   Height: 172.7 cm (68\")   PainSc: 0-No pain     Estimated body mass index is 22.14 kg/m² as calculated from the following:    Height as of this encounter: 172.7 cm (68\").    Weight as of this encounter: 66 kg (145 lb 9.6 oz).    BMI is within normal parameters. No other follow-up for BMI required.      [facility]  Gait and Balance Evaluation:  Normal    Does the patient have evidence of cognitive impairment? No          HEALTH RISK ASSESSMENT    Smoking Status:  Social History     Tobacco Use   Smoking Status Former    Current packs/day: 0.00    Average packs/day: 0.5 packs/day for 20.0 years (10.0 ttl pk-yrs)    Types: Cigarettes    Start date: 1/1/1950    Quit date: 1/1/1970    Years since quitting: " "55.6   Smokeless Tobacco Never   Tobacco Comments    Caffeine - Yes     Alcohol Consumption:  Social History     Substance and Sexual Activity   Alcohol Use Not Currently    Comment: \"very little\" on occasion     Fall Risk Screen:    E[facility] Fall Risk Assessment was completed, and patient is at LOW risk for falls.Assessment completed on:2025    Depression Screenin/25/2025    11:09 AM   PHQ-2/PHQ-9 Depression Screening   Little interest or pleasure in doing things Not at all   Feeling down, depressed, or hopeless Not at all       Health Habits and Functional and Cognitive Screenin/25/2025    11:09 AM   Functional & Cognitive Status   Do you have difficulty preparing food and eating? No   Do you have difficulty bathing yourself, getting dressed or grooming yourself? No   Do you have difficulty using the toilet? No   Do you have difficulty moving around from place to place? No   Do you have trouble with steps or getting out of a bed or a chair? No   Current Diet Well Balanced Diet   Dental Exam Up to date   Eye Exam Up to date   Exercise (times per week) 0 times per week   Current Exercises Include No Regular Exercise   Do you need help using the phone?  No   Are you deaf or do you have serious difficulty hearing?  Yes   Do you need help to go to places out of walking distance? No   Do you need help shopping? No   Do you need help preparing meals?  No   Do you need help with housework?  No   Do you need help with laundry? No   Do you need help taking your medications? No   Do you need help managing money? No   Do you ever drive or ride in a car without wearing a seat belt? Yes   Have you felt unusual fatigue (could be tiredness), stress, anger or loneliness in the last month? No   Who do you live with? Spouse   If you need help, do you have trouble finding someone available to you? No   Have you been bothered in the last four weeks by sexual problems? No   Do you have difficulty concentrating, " remembering or making decisions? No       Age-appropriate Screening Schedule:  Refer to the list below for future screening recommendations based on patient's age, sex and/or medical conditions. Orders for these recommended tests are listed in the plan section. The patient has been provided with a written plan.    Health Maintenance   Topic Date Due    DIABETIC EYE EXAM  Never done    ZOSTER VACCINE (1 of 2) Never done    COVID-19 Vaccine (10 - 2024-25 season) 03/09/2025    LIPID PANEL  07/18/2025    HEMOGLOBIN A1C  07/22/2025    INFLUENZA VACCINE  10/01/2025    URINE MICROALBUMIN-CREATININE RATIO (uACR)  01/22/2026    ANNUAL WELLNESS VISIT  07/25/2026    DIABETIC FOOT EXAM  07/25/2026    RSV Vaccine - Adults  Completed    Pneumococcal Vaccine 50+  Completed    TDAP/TD VACCINES  Discontinued                CMS Preventative Services Quick Reference  Risk Factors Identified During Encounter  Immunizations Discussed/Encouraged: Influenza and COVID19 every fall.       The above risks/problems have been discussed with the patient.  Pertinent information has been shared with the patient in the After Visit Summary.  An After Visit Summary and PPPS were made available to the patient.    Follow Up:   Next Medicare Wellness visit to be scheduled in 1 year.       Additional E&M Note during same encounter follows:  Patient has multiple medical problems which are significant and separately identifiable that require additional work above and beyond the Medicare Wellness Visit.      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        Hypertension  This is a chronic  "problem. The problem is controlled.   Treatments tried: norvasc, hydralazine, toprol, losartan.   Hypertensive end-organ damage includes CAD/MI.     Hyperlipidemia  This is a chronic problem. The current episode started more than 1 year ago. The problem is controlled.   Current antihyperlipidemic treatment includes statins and ezetimibe.   Risk factors: Known CAD - being treated.     Diabetes  He presents for his follow-up diabetic visit. He has type 2 diabetes mellitus.   Current diabetic treatments: amaryl, tradjenta.   Lab Results   Component Value Date    HGBA1C 5.7 (H) 01/22/2025      Meatal stenosis: he was seen by urology and had partial excision of distal urethral segment 6/12/25   States voiding better.   Has follow up in September.     Objective   Vital Signs:  /70 (BP Location: Left leg, Patient Position: Sitting, Cuff Size: Adult)   Pulse 59   Ht 172.7 cm (68\")   Wt 66 kg (145 lb 9.6 oz)   SpO2 98%   BMI 22.14 kg/m²     Physical Exam  Vitals reviewed.   Constitutional:       Appearance: Normal appearance. He is well-developed.   HENT:      Ears:      Comments: Hearing aid: left   Neck:      Thyroid: No thyromegaly.   Cardiovascular:      Rate and Rhythm: Normal rate and regular rhythm.      Pulses: Normal pulses.      Heart sounds: Normal heart sounds.   Pulmonary:      Effort: Pulmonary effort is normal.      Breath sounds: Normal breath sounds.      Comments: E/U   Abdominal:      General: Bowel sounds are normal.   Feet:      Comments: Diabetic Foot Exam Performed and Monofilament Test Performed     Skin:     General: Skin is warm and dry.      Capillary Refill: Capillary refill takes 2 to 3 seconds.   Neurological:      Mental Status: He is alert and oriented to person, place, and time.   Psychiatric:         Mood and Affect: Mood normal.         Behavior: Behavior normal. Behavior is cooperative.          The following data was reviewed by: Gage Pro III, NP-C on " 07/25/2025:  Common labs          9/16/2024    12:18 1/22/2025    11:59 2/26/2025    08:59   Common Labs   Glucose 108  120     BUN 18  21     Creatinine 1.45  1.59     Sodium 144  143     Potassium 4.2  4.4     Chloride 105  104     Calcium 9.3  9.5     Hemoglobin 10.8  11.1     Hematocrit 35.2  36.9     Hemoglobin A1C  5.7     Microalbumin, Urine  603.5     PSA   0.75          Details          This result is from an external source.                        Assessment and Plan     Problem List Items Addressed This Visit          Cardiac and Vasculature    Coronary artery disease (Chronic)    Overview   Continue ASA and statin          Current Assessment & Plan   No CP          Relevant Orders    Comprehensive Metabolic Panel    Lipid Panel With LDL / HDL Ratio    CBC (No Diff)    Essential hypertension - Primary (Chronic)    Current Assessment & Plan   Hypertension is improving with treatment.  Continue current treatment regimen.  Continue current medications.  Blood pressure will be reassessed at the next regular appointment.    Continue norvasc, losartan, toprol, hydralazine.    Continue low sodium diet.          Relevant Orders    Comprehensive Metabolic Panel    CBC (No Diff)    RESOLVED: Mixed hyperlipidemia       ENT    Mixed conductive and sensorineural hearing loss of both ears (Chronic)    Overview   BL hearing aids             Endocrine and Metabolic    Diabetes mellitus (Chronic)    Current Assessment & Plan   Your last A1C (3 month average) =   Lab Results   Component Value Date    HGBA1C 5.7 (H) 01/22/2025      Your diabetes is Controlled.    Plan    [x] Continue same treatment     [x] Will modify dose/ treatment if needed based upon lab results today.     [] Change:       Diet: low carbohydrate, low sugar.     ADA Website for diabetic food choices.   https://diabetes.org/food-nutrition/eating-healthy    The American Diabetes Association recommends an A1C of less than 7%.  A1C Average Levels Blood  Sugar:   6%  126 mg/dL  7%  154 mg/dL  8%  183 mg/dL  9%  212 mg/dL  10%  240 mg/dL  11%  269 mg/dL  12%  298 mg/dL    Glucose goals for many adults with diabetes  Blood sugar before meals  mg/dL  Blood sugar 1-2 hours after the start of a meal Less than 180 mg/dL A1C Less than 7%    Eye Health:   You need a diabetic eye exam yearly.   Please have a copy of the note faxed to my office.   Fax: [phone]    Foot Health:   You need a diabetic foot exam yearly.   Check your feet routinely for any wounds.   You should always check your shoes for any debris that could cause a wound.             Relevant Orders    Comprehensive Metabolic Panel    Hemoglobin A1c       Genitourinary and Reproductive     Stage 3a chronic kidney disease (Chronic)    Current Assessment & Plan   Lab Results   Component Value Date    CREATININE 1.59 (H) 01/22/2025        Avoid nephrotoxic medications - especially nsaids (like Ibuprofen, aleve, motrin): tylenol is generally safe unless you have been advised not to take.   Follow no added salt diet (goal would be to have less than    Avoid colored sodas (coke/pepsi)    Maintain blood pressure control: goal less than 130/80 to slow progression of CKD.   Maintain blood sugar control     Daily sodium content from all sources should be less than 2 grams or 2000 mg     Some information about reading labels   Understanding the terms:   Sodium Free - Only a trivial amount of sodium per serving.   Very Low Sodium - 35 mg or less per serving.   Low Sodium - 140 mg or less per serving.   Reduced Sodium - Foods in which the level of sodium is reduced by 25%.   Light or Lite in Sodium - Foods in which the sodium is reduced by at least 50% .   Simple rule of thumb : If salt is listed in the first five ingredients, the item is probably too high in sodium to use.     All food labels have milligrams (mg) of sodium listed. Follow these steps when reading the sodium information on the label:   1. Know how  much sodium you are allowed each day.   Remember that there are 1000 milligrams (mg) in 1 gram.  2. Look at the package label. Check the serving size. Nutrition values are expressed per serving.   How does this compare to your total daily allowance? If the sodium level is 500 mg or more per serving, the item is not a good choice.   3. Compare labels of similar products. Select the lowest sodium level for the same serving size.     For additional information, look at the the National Kidney Foundation Internet site at www.kidney.org                          Follow Up     Return in about 6 months (around 1/25/2026).    Patient was given instructions and counseling regarding his condition or for health maintenance advice. Please see specific information pulled into the AVS if appropriate.

## 2025-07-25 NOTE — ASSESSMENT & PLAN NOTE
Lipid abnormalities are improving with treatment    Plan:  Continue same medication/s without change.    Continue livalo, zetia and fish oil.     Discussed medication dosage, use, side effects, and goals of treatment in detail.    Counseled patient on lifestyle modifications to help control hyperlipidemia.     Patient Treatment Goals:   LDL goal is less than 70    Lab Results   Component Value Date    CHOL 159 11/24/2020    CHLPL 168 07/18/2024    TRIG 87 07/18/2024    HDL 78 07/18/2024    LDL 74 07/18/2024      Followup in 6 months.

## 2025-07-25 NOTE — ASSESSMENT & PLAN NOTE
Your last A1C (3 month average) =   Lab Results   Component Value Date    HGBA1C 5.7 (H) 01/22/2025      Your diabetes is Controlled.    Plan    [x] Continue same treatment     [x] Will modify dose/ treatment if needed based upon lab results today.     [] Change:       Diet: low carbohydrate, low sugar.     ADA Website for diabetic food choices.   https://diabetes.org/food-nutrition/eating-healthy    The American Diabetes Association recommends an A1C of less than 7%.  A1C Average Levels Blood Sugar:   6%  126 mg/dL  7%  154 mg/dL  8%  183 mg/dL  9%  212 mg/dL  10%  240 mg/dL  11%  269 mg/dL  12%  298 mg/dL    Glucose goals for many adults with diabetes  Blood sugar before meals  mg/dL  Blood sugar 1-2 hours after the start of a meal Less than 180 mg/dL A1C Less than 7%    Eye Health:   You need a diabetic eye exam yearly.   Please have a copy of the note faxed to my office.   Fax: [phone]    Foot Health:   You need a diabetic foot exam yearly.   Check your feet routinely for any wounds.   You should always check your shoes for any debris that could cause a wound.

## 2025-07-25 NOTE — ASSESSMENT & PLAN NOTE
Lab Results   Component Value Date    CREATININE 1.59 (H) 01/22/2025        Avoid nephrotoxic medications - especially nsaids (like Ibuprofen, aleve, motrin): tylenol is generally safe unless you have been advised not to take.   Follow no added salt diet (goal would be to have less than    Avoid colored sodas (coke/pepsi)    Maintain blood pressure control: goal less than 130/80 to slow progression of CKD.   Maintain blood sugar control     Daily sodium content from all sources should be less than 2 grams or 2000 mg     Some information about reading labels   Understanding the terms:   Sodium Free - Only a trivial amount of sodium per serving.   Very Low Sodium - 35 mg or less per serving.   Low Sodium - 140 mg or less per serving.   Reduced Sodium - Foods in which the level of sodium is reduced by 25%.   Light or Lite in Sodium - Foods in which the sodium is reduced by at least 50% .   Simple rule of thumb : If salt is listed in the first five ingredients, the item is probably too high in sodium to use.     All food labels have milligrams (mg) of sodium listed. Follow these steps when reading the sodium information on the label:   1. Know how much sodium you are allowed each day.   Remember that there are 1000 milligrams (mg) in 1 gram.  2. Look at the package label. Check the serving size. Nutrition values are expressed per serving.   How does this compare to your total daily allowance? If the sodium level is 500 mg or more per serving, the item is not a good choice.   3. Compare labels of similar products. Select the lowest sodium level for the same serving size.     For additional information, look at the the National Kidney Foundation Internet site at www.kidney.org

## 2025-07-26 LAB
ALBUMIN SERPL-MCNC: 4.4 G/DL (ref 3.5–5.2)
ALBUMIN/GLOB SERPL: 1.7 G/DL
ALP SERPL-CCNC: 55 U/L (ref 39–117)
ALT SERPL-CCNC: 19 U/L (ref 1–41)
AST SERPL-CCNC: 17 U/L (ref 1–40)
BILIRUB SERPL-MCNC: 0.4 MG/DL (ref 0–1.2)
BUN SERPL-MCNC: 14 MG/DL (ref 8–23)
BUN/CREAT SERPL: 9.9 (ref 7–25)
CALCIUM SERPL-MCNC: 9.2 MG/DL (ref 8.2–9.6)
CHLORIDE SERPL-SCNC: 106 MMOL/L (ref 98–107)
CHOLEST SERPL-MCNC: 157 MG/DL (ref 0–200)
CO2 SERPL-SCNC: 26.6 MMOL/L (ref 22–29)
CREAT SERPL-MCNC: 1.42 MG/DL (ref 0.76–1.27)
EGFRCR SERPLBLD CKD-EPI 2021: 46.9 ML/MIN/1.73
ERYTHROCYTE [DISTWIDTH] IN BLOOD BY AUTOMATED COUNT: 12.4 % (ref 12.3–15.4)
GLOBULIN SER CALC-MCNC: 2.6 GM/DL
GLUCOSE SERPL-MCNC: 116 MG/DL (ref 65–99)
HBA1C MFR BLD: 5.6 % (ref 4.8–5.6)
HCT VFR BLD AUTO: 35.4 % (ref 37.5–51)
HDLC SERPL-MCNC: 75 MG/DL (ref 40–60)
HGB BLD-MCNC: 10.6 G/DL (ref 13–17.7)
LDLC SERPL CALC-MCNC: 69 MG/DL (ref 0–100)
LDLC/HDLC SERPL: 0.91 {RATIO}
MCH RBC QN AUTO: 24.9 PG (ref 26.6–33)
MCHC RBC AUTO-ENTMCNC: 29.9 G/DL (ref 31.5–35.7)
MCV RBC AUTO: 83.1 FL (ref 79–97)
PLATELET # BLD AUTO: 282 10*3/MM3 (ref 140–450)
POTASSIUM SERPL-SCNC: 4.6 MMOL/L (ref 3.5–5.2)
PROT SERPL-MCNC: 7 G/DL (ref 6–8.5)
RBC # BLD AUTO: 4.26 10*6/MM3 (ref 4.14–5.8)
SODIUM SERPL-SCNC: 143 MMOL/L (ref 136–145)
TRIGL SERPL-MCNC: 68 MG/DL (ref 0–150)
VLDLC SERPL CALC-MCNC: 13 MG/DL (ref 5–40)
WBC # BLD AUTO: 5.96 10*3/MM3 (ref 3.4–10.8)

## 2025-07-28 DIAGNOSIS — D64.9 ANEMIA, UNSPECIFIED TYPE: Primary | ICD-10-CM

## 2025-07-28 DIAGNOSIS — D64.9 ANEMIA, UNSPECIFIED TYPE: ICD-10-CM

## 2025-07-28 LAB
FERRITIN SERPL-MCNC: 226 NG/ML (ref 30–400)
IRON SATN MFR SERPL: 40 % (ref 20–50)
IRON SERPL-MCNC: 139 MCG/DL (ref 59–158)
TIBC SERPL-MCNC: 344 MCG/DL
UIBC SERPL-MCNC: 205 MCG/DL (ref 112–346)
VIT B12 SERPL-MCNC: 264 PG/ML (ref 211–946)
WRITTEN AUTHORIZATION: NORMAL

## 2025-08-15 DIAGNOSIS — E11.9 DIABETES MELLITUS WITHOUT COMPLICATION: ICD-10-CM

## 2025-08-15 DIAGNOSIS — I10 HYPERTENSION, ESSENTIAL: ICD-10-CM

## 2025-08-15 DIAGNOSIS — E78.2 MIXED HYPERLIPIDEMIA: ICD-10-CM

## 2025-08-18 RX ORDER — AMLODIPINE BESYLATE 10 MG/1
10 TABLET ORAL DAILY
Qty: 90 TABLET | Refills: 1 | Status: SHIPPED | OUTPATIENT
Start: 2025-08-18

## 2025-08-20 DIAGNOSIS — I25.10 CORONARY ARTERY DISEASE INVOLVING NATIVE CORONARY ARTERY OF NATIVE HEART WITHOUT ANGINA PECTORIS: Chronic | ICD-10-CM

## 2025-08-20 DIAGNOSIS — E78.5 DYSLIPIDEMIA: Chronic | ICD-10-CM

## 2025-08-20 RX ORDER — PITAVASTATIN CALCIUM 2.09 MG/1
2 TABLET, FILM COATED ORAL NIGHTLY
Qty: 90 TABLET | Refills: 1 | Status: SHIPPED | OUTPATIENT
Start: 2025-08-20

## (undated) DEVICE — ADAPT CLN BIOGUARD AIR/H2O DISP

## (undated) DEVICE — TUBING, SUCTION, 1/4" X 10', STRAIGHT: Brand: MEDLINE

## (undated) DEVICE — CATH FOL COUDE TIEMAN 2WY 16F 5CC

## (undated) DEVICE — ARM DRAPE

## (undated) DEVICE — VESSEL SEALER EXTEND: Brand: ENDOWRIST

## (undated) DEVICE — KT ORCA ORCAPOD DISP STRL

## (undated) DEVICE — SUT PROLN 2/0 CT2 30IN 8411H

## (undated) DEVICE — ENDOPATH XCEL BLADELESS TROCARS WITH STABILITY SLEEVES: Brand: ENDOPATH XCEL

## (undated) DEVICE — REDUCER: Brand: ENDOWRIST

## (undated) DEVICE — VISUALIZATION SYSTEM: Brand: CLEARIFY

## (undated) DEVICE — APPL CHLORAPREP HI/LITE 26ML ORNG

## (undated) DEVICE — SUT MNCRYL PLS ANTIB UD 4/0 PS2 18IN

## (undated) DEVICE — THE SINGLE USE ETRAP – POLYP TRAP IS USED FOR SUCTION RETRIEVAL OF ENDOSCOPICALLY REMOVED POLYPS.: Brand: ETRAP

## (undated) DEVICE — WOUND RETRACTOR AND PROTECTOR: Brand: ALEXIS WOUND PROTECTOR-RETRACTOR

## (undated) DEVICE — COLUMN DRAPE

## (undated) DEVICE — 3M™ STERI-DRAPE™ INSTRUMENT POUCH 1018L: Brand: STERI-DRAPE™

## (undated) DEVICE — LOU LAP SIGMOID COLON: Brand: MEDLINE INDUSTRIES, INC.

## (undated) DEVICE — CANN O2 ETCO2 FITS ALL CONN CO2 SMPL A/ 7IN DISP LF

## (undated) DEVICE — DEV COND GAS LAP INSUFLOW W/LUER CONN

## (undated) DEVICE — THE TORRENT IRRIGATION SCOPE CONNECTOR IS USED WITH THE TORRENT IRRIGATION TUBING TO PROVIDE IRRIGATION FLUIDS SUCH AS STERILE WATER DURING GASTROINTESTINAL ENDOSCOPIC PROCEDURES WHEN USED IN CONJUNCTION WITH AN IRRIGATION PUMP (OR ELECTROSURGICAL UNIT).: Brand: TORRENT

## (undated) DEVICE — SOL ANTISTICK CAUTRY ELECTROLUBE LF

## (undated) DEVICE — THE CARR-LOCKE INJECTION NEEDLE IS A SINGLE USE, DISPOSABLE, FLEXIBLE SHEATH INJECTION NEEDLE USED FOR THE INJECTION OF VARIOUS TYPES OF MEDIA THROUGH FLEXIBLE ENDOSCOPES.

## (undated) DEVICE — GLV SURG BIOGEL LTX PF 8 1/2

## (undated) DEVICE — VIOLET BRAIDED (POLYGLACTIN 910), SYNTHETIC ABSORBABLE SUTURE: Brand: COATED VICRYL

## (undated) DEVICE — SOL NACL 0.9PCT 1000ML

## (undated) DEVICE — SEAL

## (undated) DEVICE — SUREFORM 45: Brand: SUREFORM

## (undated) DEVICE — CATH FOL SIMPLYLATEX SILELAST 14F 17IN

## (undated) DEVICE — SINGLE-USE BIOPSY FORCEPS: Brand: RADIAL JAW 4

## (undated) DEVICE — SNAR POLYP SENSATION STDOVL 27 240 BX40

## (undated) DEVICE — CANNULA SEAL

## (undated) DEVICE — SENSR O2 OXIMAX FNGR A/ 18IN NONSTR

## (undated) DEVICE — TIP COVER ACCESSORY

## (undated) DEVICE — BLADELESS OBTURATOR: Brand: WECK VISTA

## (undated) DEVICE — Device: Brand: SPOT EX ENDOSCOPIC TATTOO